# Patient Record
Sex: FEMALE | Race: WHITE | NOT HISPANIC OR LATINO
[De-identification: names, ages, dates, MRNs, and addresses within clinical notes are randomized per-mention and may not be internally consistent; named-entity substitution may affect disease eponyms.]

---

## 2017-01-11 ENCOUNTER — LABORATORY RESULT (OUTPATIENT)
Age: 77
End: 2017-01-11

## 2017-01-11 ENCOUNTER — APPOINTMENT (OUTPATIENT)
Dept: INTERNAL MEDICINE | Facility: CLINIC | Age: 77
End: 2017-01-11

## 2017-01-11 VITALS
RESPIRATION RATE: 14 BRPM | WEIGHT: 111 LBS | TEMPERATURE: 98.6 F | DIASTOLIC BLOOD PRESSURE: 60 MMHG | HEART RATE: 68 BPM | SYSTOLIC BLOOD PRESSURE: 118 MMHG | HEIGHT: 63 IN | BODY MASS INDEX: 19.67 KG/M2

## 2017-01-11 LAB
FLUAV SPEC QL CULT: NORMAL
FLUBV AG SPEC QL IA: NORMAL

## 2017-01-11 RX ORDER — AZELASTINE HYDROCHLORIDE AND FLUTICASONE PROPIONATE 137; 50 UG/1; UG/1
137-50 SPRAY, METERED NASAL
Qty: 23 | Refills: 0 | Status: DISCONTINUED | COMMUNITY
Start: 2016-11-23

## 2017-01-13 LAB
ALBUMIN SERPL ELPH-MCNC: 3.3 G/DL
ALP BLD-CCNC: 109 U/L
ALT SERPL-CCNC: 17 U/L
ANION GAP SERPL CALC-SCNC: 14 MMOL/L
AST SERPL-CCNC: 21 U/L
BASOPHILS # BLD AUTO: 0.02 K/UL
BASOPHILS NFR BLD AUTO: 0.3 %
BILIRUB SERPL-MCNC: 0.4 MG/DL
BUN SERPL-MCNC: 17 MG/DL
CALCIUM SERPL-MCNC: 10.4 MG/DL
CHLORIDE SERPL-SCNC: 99 MMOL/L
CO2 SERPL-SCNC: 23 MMOL/L
CREAT SERPL-MCNC: 0.83 MG/DL
EOSINOPHIL # BLD AUTO: 0.24 K/UL
EOSINOPHIL NFR BLD AUTO: 3.7 %
GLUCOSE SERPL-MCNC: 98 MG/DL
HCT VFR BLD CALC: 31.6 %
HGB BLD-MCNC: 10.3 G/DL
IMM GRANULOCYTES NFR BLD AUTO: 0.2 %
LYMPHOCYTES # BLD AUTO: 0.85 K/UL
LYMPHOCYTES NFR BLD AUTO: 13 %
MAN DIFF?: NORMAL
MCHC RBC-ENTMCNC: 31.7 PG
MCHC RBC-ENTMCNC: 32.6 GM/DL
MCV RBC AUTO: 97.2 FL
MONOCYTES # BLD AUTO: 0.62 K/UL
MONOCYTES NFR BLD AUTO: 9.5 %
NEUTROPHILS # BLD AUTO: 4.79 K/UL
NEUTROPHILS NFR BLD AUTO: 73.3 %
PLATELET # BLD AUTO: 379 K/UL
POTASSIUM SERPL-SCNC: 4.5 MMOL/L
PROT SERPL-MCNC: 6.5 G/DL
RBC # BLD: 3.25 M/UL
RBC # FLD: 13.8 %
SODIUM SERPL-SCNC: 136 MMOL/L
WBC # FLD AUTO: 6.53 K/UL

## 2017-02-01 ENCOUNTER — APPOINTMENT (OUTPATIENT)
Dept: INTERNAL MEDICINE | Facility: CLINIC | Age: 77
End: 2017-02-01

## 2017-02-05 ENCOUNTER — EMERGENCY (EMERGENCY)
Facility: HOSPITAL | Age: 77
LOS: 1 days | Discharge: HOME | End: 2017-02-05
Attending: EMERGENCY MEDICINE
Payer: MEDICARE

## 2017-02-05 VITALS
WEIGHT: 110.89 LBS | SYSTOLIC BLOOD PRESSURE: 133 MMHG | RESPIRATION RATE: 20 BRPM | DIASTOLIC BLOOD PRESSURE: 68 MMHG | TEMPERATURE: 99 F | OXYGEN SATURATION: 99 % | HEART RATE: 84 BPM

## 2017-02-05 VITALS
RESPIRATION RATE: 18 BRPM | DIASTOLIC BLOOD PRESSURE: 68 MMHG | OXYGEN SATURATION: 99 % | TEMPERATURE: 98 F | SYSTOLIC BLOOD PRESSURE: 132 MMHG | HEART RATE: 80 BPM

## 2017-02-05 DIAGNOSIS — Z88.5 ALLERGY STATUS TO NARCOTIC AGENT: ICD-10-CM

## 2017-02-05 DIAGNOSIS — Z98.890 OTHER SPECIFIED POSTPROCEDURAL STATES: ICD-10-CM

## 2017-02-05 DIAGNOSIS — E78.5 HYPERLIPIDEMIA, UNSPECIFIED: ICD-10-CM

## 2017-02-05 DIAGNOSIS — E21.3 HYPERPARATHYROIDISM, UNSPECIFIED: ICD-10-CM

## 2017-02-05 DIAGNOSIS — Z90.2 ACQUIRED ABSENCE OF LUNG [PART OF]: Chronic | ICD-10-CM

## 2017-02-05 DIAGNOSIS — I48.0 PAROXYSMAL ATRIAL FIBRILLATION: ICD-10-CM

## 2017-02-05 DIAGNOSIS — J44.9 CHRONIC OBSTRUCTIVE PULMONARY DISEASE, UNSPECIFIED: ICD-10-CM

## 2017-02-05 DIAGNOSIS — R50.9 FEVER, UNSPECIFIED: ICD-10-CM

## 2017-02-05 DIAGNOSIS — D64.9 ANEMIA, UNSPECIFIED: ICD-10-CM

## 2017-02-05 DIAGNOSIS — Z98.89 OTHER SPECIFIED POSTPROCEDURAL STATES: Chronic | ICD-10-CM

## 2017-02-05 DIAGNOSIS — Z88.0 ALLERGY STATUS TO PENICILLIN: ICD-10-CM

## 2017-02-05 DIAGNOSIS — K21.9 GASTRO-ESOPHAGEAL REFLUX DISEASE WITHOUT ESOPHAGITIS: ICD-10-CM

## 2017-02-05 DIAGNOSIS — Z88.2 ALLERGY STATUS TO SULFONAMIDES: ICD-10-CM

## 2017-02-05 DIAGNOSIS — E03.5 MYXEDEMA COMA: ICD-10-CM

## 2017-02-05 LAB
ALBUMIN SERPL ELPH-MCNC: 3.3 G/DL — SIGNIFICANT CHANGE UP (ref 3.3–5.2)
ALLERGY+IMMUNOLOGY DIAG STUDY NOTE: SIGNIFICANT CHANGE UP
ALP SERPL-CCNC: 117 U/L — SIGNIFICANT CHANGE UP (ref 40–120)
ALT FLD-CCNC: 37 U/L — HIGH
ANION GAP SERPL CALC-SCNC: 13 MMOL/L — SIGNIFICANT CHANGE UP (ref 5–17)
APPEARANCE UR: ABNORMAL
APTT BLD: 36.6 SEC — SIGNIFICANT CHANGE UP (ref 27.5–37.4)
AST SERPL-CCNC: 56 U/L — HIGH
BACTERIA # UR AUTO: ABNORMAL
BASOPHILS # BLD AUTO: 0 K/UL — SIGNIFICANT CHANGE UP (ref 0–0.2)
BASOPHILS NFR BLD AUTO: 0.3 % — SIGNIFICANT CHANGE UP (ref 0–2)
BILIRUB SERPL-MCNC: 0.3 MG/DL — LOW (ref 0.4–2)
BILIRUB UR-MCNC: ABNORMAL
BLD GP AB SCN SERPL QL: ABNORMAL
BUN SERPL-MCNC: 25 MG/DL — HIGH (ref 8–20)
CALCIUM SERPL-MCNC: 11.4 MG/DL — HIGH (ref 8.6–10.2)
CHLORIDE SERPL-SCNC: 98 MMOL/L — SIGNIFICANT CHANGE UP (ref 98–107)
CO2 SERPL-SCNC: 26 MMOL/L — SIGNIFICANT CHANGE UP (ref 22–29)
COLOR SPEC: YELLOW — SIGNIFICANT CHANGE UP
CREAT SERPL-MCNC: 0.92 MG/DL — SIGNIFICANT CHANGE UP (ref 0.5–1.3)
DIFF PNL FLD: ABNORMAL
DIR ANTIGLOB POLYSPECIFIC INTERPRETATION: SIGNIFICANT CHANGE UP
EOSINOPHIL # BLD AUTO: 0.1 K/UL — SIGNIFICANT CHANGE UP (ref 0–0.5)
EOSINOPHIL NFR BLD AUTO: 1.3 % — SIGNIFICANT CHANGE UP (ref 0–6)
EPI CELLS # UR: SIGNIFICANT CHANGE UP
GLUCOSE SERPL-MCNC: 96 MG/DL — SIGNIFICANT CHANGE UP (ref 70–115)
GLUCOSE UR QL: NEGATIVE MG/DL — SIGNIFICANT CHANGE UP
HCT VFR BLD CALC: 24.3 % — LOW (ref 37–47)
HGB BLD-MCNC: 7.8 G/DL — LOW (ref 12–16)
INR BLD: 1.43 RATIO — HIGH (ref 0.88–1.16)
KETONES UR-MCNC: NEGATIVE — SIGNIFICANT CHANGE UP
LACTATE BLDV-MCNC: 1 MMOL/L — SIGNIFICANT CHANGE UP (ref 0.5–1.6)
LEUKOCYTE ESTERASE UR-ACNC: ABNORMAL
LYMPHOCYTES # BLD AUTO: 1.1 K/UL — SIGNIFICANT CHANGE UP (ref 1–4.8)
LYMPHOCYTES # BLD AUTO: 16.6 % — LOW (ref 20–55)
MCHC RBC-ENTMCNC: 30.1 PG — SIGNIFICANT CHANGE UP (ref 27–31)
MCHC RBC-ENTMCNC: 32.1 G/DL — SIGNIFICANT CHANGE UP (ref 32–36)
MCV RBC AUTO: 93.8 FL — SIGNIFICANT CHANGE UP (ref 81–99)
MONOCYTES # BLD AUTO: 0.7 K/UL — SIGNIFICANT CHANGE UP (ref 0–0.8)
MONOCYTES NFR BLD AUTO: 10.3 % — HIGH (ref 3–10)
NEUTROPHILS # BLD AUTO: 4.8 K/UL — SIGNIFICANT CHANGE UP (ref 1.8–8)
NEUTROPHILS NFR BLD AUTO: 71.4 % — SIGNIFICANT CHANGE UP (ref 37–73)
NITRITE UR-MCNC: NEGATIVE — SIGNIFICANT CHANGE UP
OB PNL STL: POSITIVE
PH UR: 6 — SIGNIFICANT CHANGE UP (ref 4.8–8)
PLATELET # BLD AUTO: 476 K/UL — HIGH (ref 150–400)
POTASSIUM SERPL-MCNC: 4.8 MMOL/L — SIGNIFICANT CHANGE UP (ref 3.5–5.3)
POTASSIUM SERPL-SCNC: 4.8 MMOL/L — SIGNIFICANT CHANGE UP (ref 3.5–5.3)
PROT SERPL-MCNC: 8.1 G/DL — SIGNIFICANT CHANGE UP (ref 6.6–8.7)
PROT UR-MCNC: 30 MG/DL
PROTHROM AB SERPL-ACNC: 15.8 SEC — HIGH (ref 10–13.1)
RBC # BLD: 2.59 M/UL — LOW (ref 4.4–5.2)
RBC # FLD: 14.1 % — SIGNIFICANT CHANGE UP (ref 11–15.6)
RBC CASTS # UR COMP ASSIST: ABNORMAL /HPF (ref 0–4)
SODIUM SERPL-SCNC: 137 MMOL/L — SIGNIFICANT CHANGE UP (ref 135–145)
SP GR SPEC: 1.02 — SIGNIFICANT CHANGE UP (ref 1.01–1.02)
TYPE + AB SCN PNL BLD: SIGNIFICANT CHANGE UP
UROBILINOGEN FLD QL: 4 MG/DL
WBC # BLD: 6.67 K/UL — SIGNIFICANT CHANGE UP (ref 4.8–10.8)
WBC # FLD AUTO: 6.67 K/UL — SIGNIFICANT CHANGE UP (ref 4.8–10.8)
WBC UR QL: >50

## 2017-02-05 PROCEDURE — 86077 PHYS BLOOD BANK SERV XMATCH: CPT

## 2017-02-05 PROCEDURE — 99284 EMERGENCY DEPT VISIT MOD MDM: CPT

## 2017-02-05 RX ORDER — ACETAMINOPHEN 500 MG
650 TABLET ORAL ONCE
Qty: 0 | Refills: 0 | Status: COMPLETED | OUTPATIENT
Start: 2017-02-05 | End: 2017-02-05

## 2017-02-05 RX ADMIN — Medication 650 MILLIGRAM(S): at 16:44

## 2017-02-05 NOTE — CONSULT NOTE ADULT - SUBJECTIVE AND OBJECTIVE BOX
[facility] [place] LENNOX ARELLANO MD FACP   SOTO PEACOCK MD  Diplomates American Board of Internal Medicine and Infecctious Diseases  [phone]  [phone] ERIC [id number]  PT SEEN RECENTLY FOR FUO IN OFFICE WITH DR ARELLANO   HAD BLOOD CX DRAWN AS OUTPT ON 2/3 AND ON  2/4 GM POS COCCI IN CLUSTERS PRELIM COAG NEG STAPH  ALSO WITH LOW HB 7.9 CALLED FAMILY YESTERDAY AND PT  CAME IN FOR EVAL  REPEAT HB 7.8 WAS  10 ON JAN 11 2017 IN PMD OFFICE      HPI:      PAST MEDICAL & SURGICAL HISTORY:  Breast cancer: right lumpectomy RT/CT  Lung cancer: right CT/RT  GERD (gastroesophageal reflux disease)  Sinus problem  Hypothyroid  Hyperparathyroidism  Smoker  Hyperlipidemia  COPD (chronic obstructive pulmonary disease)  Anxiety  Palpitations  Atrial fibrillation: PAF  S/P lumpectomy, right breast: CT/RT  S/P partial lobectomy of lung: right      ANTIBIOTICS      Allergies    adhesives (Rash)  Ceclor (Rash)  cephalosporins (Anaphylaxis)  codeine (Hives)  Demerol HCl (Hives)  penicillin (Short breath)  sulfa drugs (Hives)  Sulfac 10% (Anaphylaxis; Short breath)    Intolerances        SOCIAL HISTORY:    FAMILY HISTORY:      Vital Signs Last 24 Hrs  T(C): 37.1, Max: 37.1 (02-05 @ 13:14)  T(F): 98.7, Max: 98.7 (02-05 @ 13:14)  HR: 84 (84 - 84)  BP: 133/68 (133/68 - 133/68)  BP(mean): --  RR: 20 (20 - 20)  SpO2: 99% (99% - 99%)  Drug Dosing Weight  Height (cm): 160 (20 Sep 2016 18:49)  Weight (kg): 50.3 (05 Feb 2017 13:14)  BMI (kg/m2): 19.6 (05 Feb 2017 13:14)  BSA (m2): 1.5 (05 Feb 2017 13:14)      REVIEW OF SYSTEMS:    CONSTITUTIONAL:  As per HPI.    HEENT:  Eyes:  No diplopia or blurred vision. ENT:  No earache, sore throat or runny nose.    CARDIOVASCULAR:  No pressure, squeezing, strangling, tightness, heaviness or aching about the chest, neck, axilla or epigastrium.    RESPIRATORY:  No cough, shortness of breath, PND or orthopnea.    GASTROINTESTINAL:  No nausea, vomiting or diarrhea.    GENITOURINARY:  No dysuria, frequency or urgency.    MUSCULOSKELETAL:  As per HPI.    SKIN:  No change in skin, hair or nails.    NEUROLOGIC:  No paresthesias, fasciculations, seizures or weakness.                  PHYSICAL EXAMINATION:    GENERAL: The patient is a well-developed, well-nourished ___FEMALE__in no apparent distress. __SHE_ is alert and oriented x3.    VITAL SIGNS: T(C): 37.1, Max: 37.1 (02-05 @ 13:14)  HR: 84 (84 - 84)  BP: 133/68 (133/68 - 133/68)  RR: 20 (20 - 20)  SpO2: 99% (99% - 99%)  Wt(kg): --    HEENT: Head is normocephalic and atraumatic.  ANICTERIC  NECK: Supple. No carotid bruits.  No lymphadenopathy or thyromegaly.    LUNGS:COARSE BREATH SOUNDS    HEART: Regular rate and rhythm without murmur.    ABDOMEN: Soft, nontender, and nondistended.  Positive bowel sounds.  No hepatosplenomegaly was noted. NO REBOUND NO GUARDING    EXTREMITIES: NO EDEMA NO ERYTHEMA    NEUROLOGIC: NON FOCAL      SKIN: No ulceration or induration present. NO RASH        BLOOD CULTURES       URINE CX          LABS:                        7.8    6.67  )-----------( 476      ( 05 Feb 2017 14:37 )             24.3     05 Feb 2017 14:37    137    |  98     |  25.0   ----------------------------<  96     4.8     |  26.0   |  0.92     Ca    11.4       05 Feb 2017 14:37    TPro  8.1    /  Alb  3.3    /  TBili  0.3    /  DBili  x      /  AST  56     /  ALT  37     /  AlkPhos  117    05 Feb 2017 14:37    PT/INR - ( 05 Feb 2017 14:37 )   PT: 15.8 sec;   INR: 1.43 ratio         PTT - ( 05 Feb 2017 14:37 )  PTT:36.6 sec      RADIOLOGY & ADDITIONAL STUDIES:      ASSESSMENT/PLAN  75YO FEMALE WITH REPORTED FUO WITH 2/4 COAG NEG STAPH  WOULD REPEAT CX X 2 SETS AS THIS MAY REPRESENT CONTAMINANTS OR SKIN JAMIE  IN TERMS OF HB WORKUP AS PER MEDICINE  WILL FOLLOW UP   D/W ANI NOLEN MD

## 2017-02-05 NOTE — ED ADULT NURSE NOTE - PMH
Anxiety    Atrial fibrillation  PAF  Breast cancer  right lumpectomy RT/CT  COPD (chronic obstructive pulmonary disease)    GERD (gastroesophageal reflux disease)    Hyperlipidemia    Hyperparathyroidism    Hypothyroid    Lung cancer  right CT/RT  Palpitations    Sinus problem    Smoker

## 2017-02-05 NOTE — ED PROVIDER NOTE - PROGRESS NOTE DETAILS
pt ok with transfusion given symptomatic anemia - does refuse admission at this time given that she feels she need to be home to care for  she will f/u with dr lovelace and group tomorrow

## 2017-02-05 NOTE — ED PROVIDER NOTE - OBJECTIVE STATEMENT
75 y/o F presents b/c of possible + blood cultures fever for 39 days as well as reported anemia as per pcp - denies blood in the stool but has had ? rectovaginal fistula- intermittent vaginal d/c brownish in color per pt feeling weak and lightheaded denies cough no abd pain no urinary c/o had been having extensive outpt f/u with Dr Shelton for FUO

## 2017-02-05 NOTE — ED ADULT NURSE NOTE - OBJECTIVE STATEMENT
pt was send to the ED by her PMD for a low Hgb.  pt stated that she had blood work done at Dr Shelton office.  then pt stated that today receive a call for a low Hgb of 7.9.  pt denies any bleeding.  pt is A/ox3.

## 2017-02-05 NOTE — ED ADULT TRIAGE NOTE - CHIEF COMPLAINT QUOTE
had blood work at Dr Shelton office. had for 40 days so went to PMD then to I/D Dr Shelton. Received call today for low blood count 7.9. no bleeding.

## 2017-02-05 NOTE — ED STATDOCS - PROGRESS NOTE DETAILS
75 y/o F, with hx of anemia, A-fib, and possible rectovaginal fistula, presenting to the ED complaining of low hemoglobin level. Pt was sent to the ED by Dr. Shelton due to hemoglobin of 7.9. Pt reports fever of 39 days without identified cause and is being workup by Dr. Shelton. Pt denies source of bleeding, abdominal pain, nausea, vomiting, diarrhea, constipation, chest pain, and SOB. Focused eval, protocol orders entered. Pt to be moved to main ED for complete evaluation by another provider.

## 2017-02-05 NOTE — ED PROVIDER NOTE - MEDICAL DECISION MAKING DETAILS
symptomatic anemia FUO for 40 days - seen by WILMER Lowry - doesn't advise abx at this time, given outpt cultures but advises another set - low grade fevers here appears will mildly symptomatic anemia - will transfuse declines admission

## 2017-02-05 NOTE — ED PROVIDER NOTE - CARE PLAN
Principal Discharge DX:	Anemia, unspecified type  Secondary Diagnosis:	FUO (fever of unknown origin)

## 2017-02-06 LAB
CULTURE RESULTS: SIGNIFICANT CHANGE UP
SPECIMEN SOURCE: SIGNIFICANT CHANGE UP

## 2017-02-07 ENCOUNTER — INPATIENT (INPATIENT)
Facility: HOSPITAL | Age: 77
LOS: 11 days | Discharge: HOME | DRG: 377 | End: 2017-02-19
Attending: INTERNAL MEDICINE
Payer: MEDICARE

## 2017-02-07 VITALS — WEIGHT: 110.89 LBS

## 2017-02-07 DIAGNOSIS — K92.2 GASTROINTESTINAL HEMORRHAGE, UNSPECIFIED: ICD-10-CM

## 2017-02-07 DIAGNOSIS — Z90.2 ACQUIRED ABSENCE OF LUNG [PART OF]: Chronic | ICD-10-CM

## 2017-02-07 DIAGNOSIS — R50.9 FEVER, UNSPECIFIED: ICD-10-CM

## 2017-02-07 DIAGNOSIS — Z98.89 OTHER SPECIFIED POSTPROCEDURAL STATES: Chronic | ICD-10-CM

## 2017-02-07 DIAGNOSIS — I48.91 UNSPECIFIED ATRIAL FIBRILLATION: ICD-10-CM

## 2017-02-07 DIAGNOSIS — N82.4 OTHER FEMALE INTESTINAL-GENITAL TRACT FISTULAE: ICD-10-CM

## 2017-02-07 DIAGNOSIS — E78.5 HYPERLIPIDEMIA, UNSPECIFIED: ICD-10-CM

## 2017-02-07 DIAGNOSIS — E03.9 HYPOTHYROIDISM, UNSPECIFIED: ICD-10-CM

## 2017-02-07 LAB
ALBUMIN SERPL ELPH-MCNC: 3.2 G/DL — LOW (ref 3.3–5.2)
ALLERGY+IMMUNOLOGY DIAG STUDY NOTE: SIGNIFICANT CHANGE UP
ALP SERPL-CCNC: 109 U/L — SIGNIFICANT CHANGE UP (ref 40–120)
ALT FLD-CCNC: 33 U/L — HIGH
ANION GAP SERPL CALC-SCNC: 12 MMOL/L — SIGNIFICANT CHANGE UP (ref 5–17)
APPEARANCE UR: ABNORMAL
AST SERPL-CCNC: 43 U/L — HIGH
BASOPHILS # BLD AUTO: 0 K/UL — SIGNIFICANT CHANGE UP (ref 0–0.2)
BASOPHILS NFR BLD AUTO: 0.1 % — SIGNIFICANT CHANGE UP (ref 0–2)
BILIRUB SERPL-MCNC: 0.4 MG/DL — SIGNIFICANT CHANGE UP (ref 0.4–2)
BILIRUB UR-MCNC: NEGATIVE — SIGNIFICANT CHANGE UP
BLD GP AB SCN SERPL QL: ABNORMAL
BUN SERPL-MCNC: 20 MG/DL — SIGNIFICANT CHANGE UP (ref 8–20)
CALCIUM SERPL-MCNC: 10.9 MG/DL — HIGH (ref 8.6–10.2)
CHLORIDE SERPL-SCNC: 100 MMOL/L — SIGNIFICANT CHANGE UP (ref 98–107)
CO2 SERPL-SCNC: 27 MMOL/L — SIGNIFICANT CHANGE UP (ref 22–29)
COLOR SPEC: YELLOW — SIGNIFICANT CHANGE UP
CREAT SERPL-MCNC: 0.73 MG/DL — SIGNIFICANT CHANGE UP (ref 0.5–1.3)
DIFF PNL FLD: ABNORMAL
EOSINOPHIL # BLD AUTO: 0.1 K/UL — SIGNIFICANT CHANGE UP (ref 0–0.5)
EOSINOPHIL NFR BLD AUTO: 1 % — SIGNIFICANT CHANGE UP (ref 0–6)
GLUCOSE SERPL-MCNC: 101 MG/DL — SIGNIFICANT CHANGE UP (ref 70–115)
GLUCOSE UR QL: NEGATIVE MG/DL — SIGNIFICANT CHANGE UP
HCT VFR BLD CALC: 28.1 % — LOW (ref 37–47)
HGB BLD-MCNC: 9.2 G/DL — LOW (ref 12–16)
KETONES UR-MCNC: ABNORMAL
LEUKOCYTE ESTERASE UR-ACNC: ABNORMAL
LYMPHOCYTES # BLD AUTO: 0.8 K/UL — LOW (ref 1–4.8)
LYMPHOCYTES # BLD AUTO: 9.9 % — LOW (ref 20–55)
MCHC RBC-ENTMCNC: 30.6 PG — SIGNIFICANT CHANGE UP (ref 27–31)
MCHC RBC-ENTMCNC: 32.7 G/DL — SIGNIFICANT CHANGE UP (ref 32–36)
MCV RBC AUTO: 93.4 FL — SIGNIFICANT CHANGE UP (ref 81–99)
MONOCYTES # BLD AUTO: 0.7 K/UL — SIGNIFICANT CHANGE UP (ref 0–0.8)
MONOCYTES NFR BLD AUTO: 8.8 % — SIGNIFICANT CHANGE UP (ref 3–10)
NEUTROPHILS # BLD AUTO: 6.5 K/UL — SIGNIFICANT CHANGE UP (ref 1.8–8)
NEUTROPHILS NFR BLD AUTO: 80 % — HIGH (ref 37–73)
NITRITE UR-MCNC: NEGATIVE — SIGNIFICANT CHANGE UP
PH UR: 6 — SIGNIFICANT CHANGE UP (ref 4.8–8)
PLATELET # BLD AUTO: 444 K/UL — HIGH (ref 150–400)
POTASSIUM SERPL-MCNC: 4.3 MMOL/L — SIGNIFICANT CHANGE UP (ref 3.5–5.3)
POTASSIUM SERPL-SCNC: 4.3 MMOL/L — SIGNIFICANT CHANGE UP (ref 3.5–5.3)
PROT SERPL-MCNC: 8.2 G/DL — SIGNIFICANT CHANGE UP (ref 6.6–8.7)
PROT UR-MCNC: 30 MG/DL
RBC # BLD: 3.01 M/UL — LOW (ref 4.4–5.2)
RBC # FLD: 14.5 % — SIGNIFICANT CHANGE UP (ref 11–15.6)
SODIUM SERPL-SCNC: 139 MMOL/L — SIGNIFICANT CHANGE UP (ref 135–145)
SP GR SPEC: 1.01 — SIGNIFICANT CHANGE UP (ref 1.01–1.02)
TSH SERPL-MCNC: 1.36 UIU/ML — SIGNIFICANT CHANGE UP (ref 0.27–4.2)
TYPE + AB SCN PNL BLD: SIGNIFICANT CHANGE UP
UROBILINOGEN FLD QL: 4 MG/DL
WBC # BLD: 8.16 K/UL — SIGNIFICANT CHANGE UP (ref 4.8–10.8)
WBC # FLD AUTO: 8.16 K/UL — SIGNIFICANT CHANGE UP (ref 4.8–10.8)

## 2017-02-07 PROCEDURE — 71010: CPT | Mod: 26

## 2017-02-07 PROCEDURE — 99285 EMERGENCY DEPT VISIT HI MDM: CPT

## 2017-02-07 PROCEDURE — 93010 ELECTROCARDIOGRAM REPORT: CPT

## 2017-02-07 PROCEDURE — 99223 1ST HOSP IP/OBS HIGH 75: CPT

## 2017-02-07 RX ORDER — PANTOPRAZOLE SODIUM 20 MG/1
40 TABLET, DELAYED RELEASE ORAL
Qty: 0 | Refills: 0 | Status: DISCONTINUED | OUTPATIENT
Start: 2017-02-07 | End: 2017-02-19

## 2017-02-07 RX ORDER — CHOLECALCIFEROL (VITAMIN D3) 125 MCG
1000 CAPSULE ORAL DAILY
Qty: 0 | Refills: 0 | Status: DISCONTINUED | OUTPATIENT
Start: 2017-02-07 | End: 2017-02-19

## 2017-02-07 RX ORDER — FLECAINIDE ACETATE 50 MG
50 TABLET ORAL EVERY 12 HOURS
Qty: 0 | Refills: 0 | Status: DISCONTINUED | OUTPATIENT
Start: 2017-02-07 | End: 2017-02-19

## 2017-02-07 RX ORDER — SIMVASTATIN 20 MG/1
20 TABLET, FILM COATED ORAL AT BEDTIME
Qty: 0 | Refills: 0 | Status: DISCONTINUED | OUTPATIENT
Start: 2017-02-07 | End: 2017-02-19

## 2017-02-07 RX ORDER — ATENOLOL 25 MG/1
25 TABLET ORAL DAILY
Qty: 0 | Refills: 0 | Status: DISCONTINUED | OUTPATIENT
Start: 2017-02-07 | End: 2017-02-19

## 2017-02-07 RX ORDER — ALPRAZOLAM 0.25 MG
0.25 TABLET ORAL THREE TIMES A DAY
Qty: 0 | Refills: 0 | Status: DISCONTINUED | OUTPATIENT
Start: 2017-02-07 | End: 2017-02-07

## 2017-02-07 RX ADMIN — SIMVASTATIN 20 MILLIGRAM(S): 20 TABLET, FILM COATED ORAL at 20:51

## 2017-02-07 RX ADMIN — Medication 50 MILLIGRAM(S): at 19:53

## 2017-02-07 NOTE — ED STATDOCS - PROGRESS NOTE DETAILS
76 year old female presenting to the ED with bright red blood in her stool x 2 episodes yesterday, weakness, chills, and fever. Pt states that she had seen infectious diseases 4 days ago and was sent for a blood test 3 days ago. She states that she visited the hospital 2 days ago and had a guaiac positive test. Pt states that she had 2 bowel movements yesterday which had bright red stool. She states that her last colonoscopy was performed in November 2016. Pt states that she does not have any abdominal pain, nausea, or vomiting currently. She states that she has PMHx of a-fib, HLD, and lung resection. Upon examination, pt was found to have bronchovesicular breath sounds in the right lower lobe and right upper lobe. Will transfer to the main ED for further evaluation by another provider.  Contact: Gamaliel Ram ([phone])

## 2017-02-07 NOTE — H&P ADULT. - PROBLEM SELECTOR PLAN 1
hemodynamically stable.  ASA discontinued for now.  started on PPI.  repeat H/H in am.  GI consult-

## 2017-02-07 NOTE — ED PROVIDER NOTE - OBJECTIVE STATEMENT
PT WAS Sseen in Ed two days ago found to have new anemia and was suppose to stay but could not b/c of sick  returns today for , pt also with daily fever x 41 days admission after two episode of diarrhea bloody

## 2017-02-07 NOTE — H&P ADULT. - HISTORY OF PRESENT ILLNESS
76 years old female with PMH of HTN, A.Fib, dyslipidemia, Anxiety and abnormal colonoscopy on 11/2016 presented to the ER for the evaluation of continued bloody stools associated with fatigue and generalized weakness. She was seen in the ER 2 days ago for anemia and received PRBC. She was sent home to follow up with her PCP. Patient reports 1 month of fever for which she was seen by . He ordered blood work but didn't put her on any antibiotic. Patient also reports poor appetites, loss of weight and constipation. Patient also has a h/o colovaginal fistula for which she was seen by   but no surgical intervention was offered (per patient). Denies any abdominal pain, nausea, vomiting, SOB, dizziness or lightheadedness.

## 2017-02-07 NOTE — ED CLERICAL - NS ED CLERK UNITS
6TWR PAIN ACS/6TWR MON/PAIN ACS/2GUL mon/5TWR [id number] med overflow/5TWR 5TWR/ISO mon ISO/5TWR [id number] mon ISO/4TWR

## 2017-02-07 NOTE — ED STATDOCS - OBJECTIVE STATEMENT
colovaginal fistula with rectal bleeding . orange bloody stool   monday x two no pain   no n/v/, c/o fever tmax 100

## 2017-02-07 NOTE — ED ADULT NURSE NOTE - OBJECTIVE STATEMENT
Patient reports hospitalization earlier this week with low HgB 7.8 and received PRBCs but due to caregiver issues for her , patient was not admitted. Patient reports on Monday she began having orange colored diarrhea. patient reports hx of fever of unknown origin, MD Shelton infectious Disease has consulted on her case and she chronically for past 41 days feels fever, chills and fatigue. patient denies any pain. Skin pink dry warm. no obvious bleeding noted. IV access and labs obtained.

## 2017-02-07 NOTE — ED ADULT TRIAGE NOTE - CHIEF COMPLAINT QUOTE
GIB. Was here Sunday and did not want to be admitted. Still has bleeding. PMD says go back to ED and stay this time. Sees Dr Orozco and Dr Shelton of I/D for fevers of unk origin. no pain.

## 2017-02-07 NOTE — H&P ADULT. - ASSESSMENT
76 years old female with PMH of HTN, A.Fib, dyslipidemia, lung/breast cancer and abnormal colonoscopy in 11/2016 admitted with GI bleed.

## 2017-02-08 DIAGNOSIS — R50.9 FEVER, UNSPECIFIED: ICD-10-CM

## 2017-02-08 DIAGNOSIS — D64.9 ANEMIA, UNSPECIFIED: ICD-10-CM

## 2017-02-08 LAB
C DIFF BY PCR RESULT: DETECTED
C DIFF TOX GENS STL QL NAA+PROBE: SIGNIFICANT CHANGE UP
DIR ANTIGLOB POLYSPECIFIC INTERPRETATION: SIGNIFICANT CHANGE UP
HCT VFR BLD CALC: 24.8 % — LOW (ref 37–47)
HCT VFR BLD CALC: 26.6 % — LOW (ref 37–47)
HGB BLD-MCNC: 8.1 G/DL — LOW (ref 12–16)
HGB BLD-MCNC: 8.5 G/DL — LOW (ref 12–16)
MCHC RBC-ENTMCNC: 30.1 PG — SIGNIFICANT CHANGE UP (ref 27–31)
MCHC RBC-ENTMCNC: 30.2 PG — SIGNIFICANT CHANGE UP (ref 27–31)
MCHC RBC-ENTMCNC: 32 G/DL — SIGNIFICANT CHANGE UP (ref 32–36)
MCHC RBC-ENTMCNC: 32.7 G/DL — SIGNIFICANT CHANGE UP (ref 32–36)
MCV RBC AUTO: 92.5 FL — SIGNIFICANT CHANGE UP (ref 81–99)
MCV RBC AUTO: 94.3 FL — SIGNIFICANT CHANGE UP (ref 81–99)
PLATELET # BLD AUTO: 374 K/UL — SIGNIFICANT CHANGE UP (ref 150–400)
PLATELET # BLD AUTO: 414 K/UL — HIGH (ref 150–400)
PROCALCITONIN SERPL-MCNC: <0.23 NG/ML — SIGNIFICANT CHANGE UP (ref 0–0.5)
RBC # BLD: 2.68 M/UL — LOW (ref 4.4–5.2)
RBC # BLD: 2.82 M/UL — LOW (ref 4.4–5.2)
RBC # FLD: 14.3 % — SIGNIFICANT CHANGE UP (ref 11–15.6)
RBC # FLD: 14.3 % — SIGNIFICANT CHANGE UP (ref 11–15.6)
WBC # BLD: 6.85 K/UL — SIGNIFICANT CHANGE UP (ref 4.8–10.8)
WBC # BLD: 7.55 K/UL — SIGNIFICANT CHANGE UP (ref 4.8–10.8)
WBC # FLD AUTO: 6.85 K/UL — SIGNIFICANT CHANGE UP (ref 4.8–10.8)
WBC # FLD AUTO: 7.55 K/UL — SIGNIFICANT CHANGE UP (ref 4.8–10.8)

## 2017-02-08 PROCEDURE — 74176 CT ABD & PELVIS W/O CONTRAST: CPT | Mod: 26

## 2017-02-08 PROCEDURE — 99222 1ST HOSP IP/OBS MODERATE 55: CPT

## 2017-02-08 PROCEDURE — 99233 SBSQ HOSP IP/OBS HIGH 50: CPT | Mod: GC

## 2017-02-08 RX ORDER — VANCOMYCIN HCL 1 G
125 VIAL (EA) INTRAVENOUS EVERY 6 HOURS
Qty: 0 | Refills: 0 | Status: COMPLETED | OUTPATIENT
Start: 2017-02-08 | End: 2017-02-15

## 2017-02-08 RX ORDER — ACETAMINOPHEN 500 MG
650 TABLET ORAL EVERY 6 HOURS
Qty: 0 | Refills: 0 | Status: DISCONTINUED | OUTPATIENT
Start: 2017-02-08 | End: 2017-02-19

## 2017-02-08 RX ORDER — LOPERAMIDE HCL 2 MG
2 TABLET ORAL ONCE
Qty: 0 | Refills: 0 | Status: COMPLETED | OUTPATIENT
Start: 2017-02-08 | End: 2017-02-09

## 2017-02-08 RX ADMIN — Medication 650 MILLIGRAM(S): at 17:20

## 2017-02-08 RX ADMIN — Medication 50 MILLIGRAM(S): at 05:38

## 2017-02-08 RX ADMIN — ATENOLOL 25 MILLIGRAM(S): 25 TABLET ORAL at 05:38

## 2017-02-08 RX ADMIN — Medication 650 MILLIGRAM(S): at 11:19

## 2017-02-08 RX ADMIN — Medication 50 MILLIGRAM(S): at 19:40

## 2017-02-08 RX ADMIN — Medication 1000 UNIT(S): at 13:22

## 2017-02-08 RX ADMIN — PANTOPRAZOLE SODIUM 40 MILLIGRAM(S): 20 TABLET, DELAYED RELEASE ORAL at 05:38

## 2017-02-08 NOTE — PROGRESS NOTE ADULT - SUBJECTIVE AND OBJECTIVE BOX
BRANNON GRANT    [id number]    76y      Female    INTERVAL HPI/OVERNIGHT EVENTS:  77 y/o female with hx of HTN, Afib, dyslipidemia, Anxiety, and abnormal colonoscopy 2016 presented to ED for evaluation of continued bloody stools associated with fatigue and generalized weakness.  She had been seen in ED 2 days prior for anemia and transfused 2 units PrBC.  Pt reports one month of fever for which she was seen by Nikita.  He ordered a blood work up but did not put her on antibiotics.  Pt also reports poor appetite, loss of weight and constipation.  Pt denies any abdominal pain this morning.  C/o of chills, current temp 100.5,  Pt reports she had one questionable bloody BM on Monday and none since. She has been having regular BMs.  Denies n/v/SBO/ lightheadness/ dizziness.    REVIEW OF SYSTEMS:    CONSTITUTIONAL: + fever,  + weight loss, + fatigue  RESPIRATORY: No cough, wheezing, hemoptysis; No shortness of breath  CARDIOVASCULAR: No chest pain, palpitations  GASTROINTESTINAL: No abdominal or epigastric pain. No nausea, vomiting  NEUROLOGICAL: No headaches, memory loss, loss of strength.      Vital Signs Last 24 Hrs  T(C): 37.7, Max: 37.9 (- @ 15:38)  T(F): 99.8, Max: 100.3 (02- @ 15:38)  HR: 74 (72 - 83)  BP: 106/56 (93/48 - 130/73)  BP(mean): 91 (91 - 91)  RR: 18 (16 - 18)  SpO2: 100% (94% - 100%)    PHYSICAL EXAM:    GENERAL: NAD, frail  HEENT: PERRL, +EOMI  NECK: soft, Supple, No JVD,   CHEST/LUNG: Clear to percussion, decreased bs at base on left  HEART: S1S2+, Regular rate and rhythm; No murmurs, rubs, or gallops  ABDOMEN: Soft, Nontender, Nondistended; Bowel sounds present  EXTREMITIES:  2+ Peripheral Pulses, No clubbing, cyanosis, or edema  SKIN: No rashes or lesions  NEURO: AAOX3, no focal deficits, no motor r sensory loss  PSYCH: normal mood      LABS:                        8.1    6.85  )-----------( 374      ( 2017 06:25 )             24.8     2017 14:13    139    |  100    |  20.0   ----------------------------<  101    4.3     |  27.0   |  0.73     Ca    10.9       2017 14:13    TPro  8.2    /  Alb  3.2    /  TBili  0.4    /  DBili  x      /  AST  43     /  ALT  33     /  AlkPhos  109    2017 14:13      Urinalysis Basic - ( 2017 16:11 )    Color: Yellow / Appearance: Slightly Turbid / S.015 / pH: x  Gluc: x / Ketone: Trace  / Bili: Negative / Urobili: 4 mg/dL   Blood: x / Protein: 30 mg/dL / Nitrite: Negative   Leuk Esterase: Moderate / RBC: 3-5 /HPF / WBC >50   Sq Epi: x / Non Sq Epi: Few / Bacteria: Moderate          MEDICATIONS  (STANDING):  flecainide 50milliGRAM(s) Oral every 12 hours  simvastatin 20milliGRAM(s) Oral at bedtime  ATENolol  Tablet 25milliGRAM(s) Oral daily  cholecalciferol 1000Unit(s) Oral daily  pantoprazole    Tablet 40milliGRAM(s) Oral before breakfast    MEDICATIONS  (PRN):  ALPRAZolam 0.25milliGRAM(s) Oral three times a day PRN anxiety      RADIOLOGY & ADDITIONAL TESTS:    CXR:  Abundant right upper lobe fibrosis. No acute changes. There has been no   change since 10/31/2011..    Infectious Disease and GI consults appreciated    2016 CT colonography- Diverticulosis, persistent narrowing of sigmoid colon BRANNON GRANT    [id number]    76y      Female    INTERVAL HPI/OVERNIGHT EVENTS:  77 y/o female with hx of HTN, Afib, dyslipidemia, Anxiety, and abnormal colonoscopy 2016 presented to ED for evaluation of continued bloody stools associated with fatigue and generalized weakness.  She had been seen in ED 2 days prior for anemia and transfused 2 units PrBC.  Pt reported one month of fever for which she was seen by Nikita.  He ordered a blood work up but did not put her on antibiotics.  Pt also reported poor appetite, loss of weight and constipation.  Pt denied any abdominal pain this morning.  C/o of chills, current temp 100.5,  Pt reports she had one questionable bloody BM on Monday and none since. She had been having regular BMs.      Seen in ED.  Notes some loose BMs.  No abdominal pain.  Subjective fever.  No chills.  No additional complaints.       REVIEW OF SYSTEMS:    CONSTITUTIONAL: + fever,  + weight loss, + fatigue  RESPIRATORY: No cough, wheezing, hemoptysis; No shortness of breath  CARDIOVASCULAR: No chest pain, palpitations  GASTROINTESTINAL: No abdominal or epigastric pain. No nausea, vomiting  NEUROLOGICAL: No headaches, memory loss, loss of strength.    PHYSICAL EXAM:    GENERAL: NAD, frail  HEENT: PERRL, +EOMI  NECK: soft, Supple, No JVD,   CHEST/LUNG: Clear to percussion, decreased bs at base on left  HEART: S1S2+, Regular rate and rhythm; No murmurs, rubs, or gallops  ABDOMEN: Soft, Nontender, Nondistended; Bowel sounds present  EXTREMITIES:  2+ Peripheral Pulses, No clubbing, cyanosis, or edema  SKIN: No rashes or lesions  NEURO: AAOX3, no focal deficits, no motor r sensory loss  PSYCH: normal mood        Vital Signs Last 24 Hrs  T(C): 37.7, Max: 37.9 (- @ 15:38)  T(F): 99.8, Max: 100.3 (02- @ 15:38)  HR: 74 (72 - 83)  BP: 106/56 (93/48 - 130/73)  BP(mean): 91 (91 - 91)  RR: 18 (16 - 18)  SpO2: 100% (94% - 100%)      LABS:                        8.1    6.85  )-----------( 374      ( 2017 06:25 )             24.8     2017 14:13    139    |  100    |  20.0   ----------------------------<  101    4.3     |  27.0   |  0.73     Ca    10.9       2017 14:13    TPro  8.2    /  Alb  3.2    /  TBili  0.4    /  DBili  x      /  AST  43     /  ALT  33     /  AlkPhos  109    2017 14:13      Urinalysis Basic - ( 2017 16:11 )    Color: Yellow / Appearance: Slightly Turbid / S.015 / pH: x  Gluc: x / Ketone: Trace  / Bili: Negative / Urobili: 4 mg/dL   Blood: x / Protein: 30 mg/dL / Nitrite: Negative   Leuk Esterase: Moderate / RBC: 3-5 /HPF / WBC >50   Sq Epi: x / Non Sq Epi: Few / Bacteria: Moderate          MEDICATIONS  (STANDING):  flecainide 50milliGRAM(s) Oral every 12 hours  simvastatin 20milliGRAM(s) Oral at bedtime  ATENolol  Tablet 25milliGRAM(s) Oral daily  cholecalciferol 1000Unit(s) Oral daily  pantoprazole    Tablet 40milliGRAM(s) Oral before breakfast    MEDICATIONS  (PRN):  ALPRAZolam 0.25milliGRAM(s) Oral three times a day PRN anxiety      RADIOLOGY & ADDITIONAL TESTS:    CXR:  Abundant right upper lobe fibrosis. No acute changes. There has been no   change since 10/31/2011..    Infectious Disease and GI consults appreciated    2016 CT colonography- Diverticulosis, persistent narrowing of sigmoid colon

## 2017-02-08 NOTE — CONSULT NOTE ADULT - PROBLEM SELECTOR RECOMMENDATION 2
- report of sigmoid thickening on recent colonography; concern raised for neoplasm.  - pending CT abd/pelvis with PO contrast per GI recommendations  - will need to follow results

## 2017-02-08 NOTE — CONSULT NOTE ADULT - SUBJECTIVE AND OBJECTIVE BOX
Patient is a 76y old  Female who presents with a chief complaint of Bloody stools. (07 Feb 2017 16:29)      HPI:  76 years old female with PMH of HTN, A.Fib, dyslipidemia. Patient came to ER with complaints of fever , chills for over 1 month. Severe fatigue for about 1 weeks. Cam to ER 2 days ago. She was transfused and sent home as her  has parkinsons and needs care. . Patient states she  has low grade leveres 100.3 and if it goes to 101, she take tylenol. . Has no nausea, no vomiting, no abdominal pain. had 1 episode of non bloody diarrhea yesterday at  home followed by normal Bm in ER.  She per primary MD note pt reported 2 edpisodes of rectal bleeding. However, pt denies rectal bleeding to me. Lost 9 lbs in 4 months.  Had colonoscopy in November for abdominal pain.  The colonoscopy was unable to be completed ( unable to pass scope beyond the sigmoid). She had a virtual colonoscopy which showed persistant narrowing 6 cm of sigmoid. Pt saw Dr Mckenzie for this , but has no been back for follow up . Unclear what th plan was.  Baseline hemoglobin was 12 in September 2016 and 2 days ago hemoglobin was7.8    REVIEW OF SYSTEMS:  Constitutional: No fever, weight loss or fatigue  ENMT:  No difficulty hearing, tinnitus, vertigo; No sinus or throat pain  Respiratory: No cough, wheezing, chills or hemoptysis  Cardiovascular: No chest pain, palpitations, dizziness or leg swelling  Gastrointestinal: No abdominal or epigastric pain. No nausea, vomiting or hematemesis; No diarrhea or constipation. No melena or hematochezia.  Skin: No itching, burning, rashes or lesions   Musculoskeletal: No joint pain or swelling; No muscle, back or extremity pain    PAST MEDICAL & SURGICAL HISTORY:  Breast cancer: right lumpectomy RT/CT  Lung cancer: right CT/RT  GERD (gastroesophageal reflux disease)  Sinus problem  Hypothyroid  Hyperparathyroidism  Smoker  Hyperlipidemia  COPD (chronic obstructive pulmonary disease)  Anxiety  Palpitations  Atrial fibrillation: PAF  S/P lumpectomy, right breast: CT/RT  S/P partial lobectomy of lung: right      FAMILY HISTORY:  No pertinent family history in first degree relatives      SOCIAL HISTORY:  Smoking Status: [ ] Current, [ ] Former, [ ] Never  Pack Years:  [  ] EtOH  [  ] IVDA    MEDICATIONS:  MEDICATIONS  (STANDING):  flecainide 50milliGRAM(s) Oral every 12 hours  simvastatin 20milliGRAM(s) Oral at bedtime  ATENolol  Tablet 25milliGRAM(s) Oral daily  cholecalciferol 1000Unit(s) Oral daily  pantoprazole    Tablet 40milliGRAM(s) Oral before breakfast    MEDICATIONS  (PRN):  ALPRAZolam 0.25milliGRAM(s) Oral three times a day PRN anxiety      Allergies    adhesives (Rash)  Ceclor (Rash)  cephalosporins (Anaphylaxis)  codeine (Hives)  Demerol HCl (Hives)  penicillin (Short breath)  sulfa drugs (Hives)  Sulfac 10% (Anaphylaxis; Short breath)    Intolerances        Vital Signs Last 24 Hrs  T(C): 37.7, Max: 37.9 (02-07 @ 15:38)  T(F): 99.8, Max: 100.3 (02-07 @ 15:38)  HR: 74 (72 - 83)  BP: 106/56 (93/48 - 130/73)  BP(mean): 91 (91 - 91)  RR: 18 (16 - 18)  SpO2: 100% (94% - 100%)    I & Os for current day (as of 02-08 @ 08:01)  =============================================  IN: 480 ml / OUT: 0 ml / NET: 480 ml        PHYSICAL EXAM:    General: thin ; in no acute distress  HEENT: MMM, conjunctiva and sclera clear  Gastrointestinal: Soft, non-tender non-distended; Normal bowel sounds; No rebound or guarding  Extremities: Normal range of motion, No clubbing, cyanosis or edema  Neurological: Alert and oriented x3  Skin: Warm and dry. No obvious rash  rectal - no overt melena or blood. No masses      LABS:                        8.1    6.85  )-----------( 374      ( 08 Feb 2017 06:25 )             24.8                 RADIOLOGY & ADDITIONAL STUDIES:

## 2017-02-08 NOTE — CONSULT NOTE ADULT - SUBJECTIVE AND OBJECTIVE BOX
[facility] INFECTIOUS DISEASES AND INTERNAL MEDICINE OF [place]  =======================================================  Joe Shelton MD [facility]   Mak Goss MD  Diplomates American Board of Internal Medicine and Infectious Diseases  =======================================================    [facility]-[id number]  BRANNON GRANT is a 76y  Female   This 76 y.o. F with HTN, A.Fib, dyslipidemia, anxiety, prior incomplete colonoscopy due to inability to pass sigmoid in 2016,  who is here for workup of rectal bleeding/ bloody stools.     She has been seen in our office with Dr. Shelton for workup of fevers.  Also noted in her history is prior breast cancer in 2002, lung cancer/ scar cancer from old TB in 2003, ARUNA in [address]3y, prior XRT to tonsils as a child, hemithyroidectomy, hyperparathyroidism.  history of colovaginal fistula, but recent CT colonography is without fistula.          =======================================================  Past Medical & Surgical Hx:  =====================  PAST MEDICAL & SURGICAL HISTORY:  Breast cancer: right lumpectomy RT/CT  Lung cancer: right CT/RT  GERD (gastroesophageal reflux disease)  Sinus problem  Hypothyroid  Hyperparathyroidism  Smoker  Hyperlipidemia  COPD (chronic obstructive pulmonary disease)  Anxiety  Palpitations  Atrial fibrillation: PAF  S/P lumpectomy, right breast: CT/RT  S/P partial lobectomy of lung: right      Problem List:  ==========  HEALTH ISSUES - PROBLEM Dx:  Anemia: Anemia  Colovaginal fistula: Colovaginal fistula  Fever: Fever  Hyperlipidemia: Hyperlipidemia  Hypothyroid: Hypothyroid  Atrial fibrillation: Atrial fibrillation  Gastrointestinal hemorrhage, unspecified gastrointestinal hemorrhage type: Gastrointestinal hemorrhage, unspecified gastrointestinal hemorrhage type          Social Hx:  =======    FAMILY HISTORY:  No pertinent family history in first degree relatives      Allergies    adhesives (Rash)  Ceclor (Rash)  cephalosporins (Anaphylaxis)  codeine (Hives)  Demerol HCl (Hives)  penicillin (Short breath)  sulfa drugs (Hives)  Sulfac 10% (Anaphylaxis; Short breath)    Intolerances        MEDICATIONS  (STANDING):  flecainide 50milliGRAM(s) Oral every 12 hours  simvastatin 20milliGRAM(s) Oral at bedtime  ATENolol  Tablet 25milliGRAM(s) Oral daily  cholecalciferol 1000Unit(s) Oral daily  pantoprazole    Tablet 40milliGRAM(s) Oral before breakfast    MEDICATIONS  (PRN):  ALPRAZolam 0.25milliGRAM(s) Oral three times a day PRN anxiety        ANTIBIOTICS:      =======================================================  REVIEW OF SYSTEMS:  CONSTITUTIONAL:  as per HPI  HEENT:  Eyes:  No diplopia or blurred vision. ENT:  No earache, sore throat or runny nose.  CARDIOVASCULAR:  No pressure, squeezing, strangling, tightness, heaviness or aching about the chest, neck, axilla or epigastrium.  RESPIRATORY:  No cough, shortness of breath, PND or orthopnea.  GASTROINTESTINAL:  No nausea, vomiting or diarrhea.  GENITOURINARY:  No dysuria, frequency or urgency. No Blood in urine  MUSCULOSKELETAL:  no joint aches, no muscle pain  SKIN:  No change in skin, hair or nails.  NEUROLOGIC:  No paresthesias, fasciculations, seizures or weakness.  PSYCHIATRIC:  No disorder of thought or mood.  ENDOCRINE:  No heat or cold intolerance, polyuria or polydipsia.  HEMATOLOGICAL:  No easy bruising or bleeding.     =======================================================  I&O's Detail    I & Os for current day (as of 2017 08:43)  =============================================  IN:    Oral Fluid: 480 ml    Total IN: 480 ml  ---------------------------------------------  OUT:    Total OUT: 0 ml  ---------------------------------------------  Total NET: 480 ml      Physical Exam:  ============  Vital Signs Last 24 Hrs  T(C): 37.7, Max: 37.9 ( @ 15:38)  T(F): 99.8, Max: 100.3 ( @ 15:38)  HR: 74 (72 - 83)  BP: 106/56 (93/48 - 130/73)  BP(mean): 91 (91 - 91)  RR: 18 (16 - 18)  SpO2: 100% (94% - 100%)    Weight (kg): 50.3 ( @ 12:43)  GEN: NAD, pleasant  HEENT: normocephalic and atraumatic. EOMI. STEPHANIE.    NECK: Supple. No carotid bruits.  No lymphadenopathy or thyromegaly.  LUNGS: Clear to auscultation.  HEART: Regular rate and rhythm without murmur.  ABDOMEN: Soft, nontender, and nondistended.  Positive bowel sounds.    EXTREMITIES: Without any cyanosis, clubbing, rash, lesions or edema.  NEUROLOGIC: Cranial nerves II through XII are grossly intact.  PSYCHIATRIC: Appropriate affect .  SKIN: No ulceration or induration present.    =======================================================  Labs:  ====   2017 14:13    139    |  100    |  20.0   ----------------------------<  101    4.3     |  27.0   |  0.73     Ca    10.9       2017 14:13    TPro  8.2    /  Alb  3.2    /  TBili  0.4    /  DBili  x      /  AST  43     /  ALT  33     /  AlkPhos  109    2017 14:13                          8.1    6.85  )-----------( 374      ( 2017 06:25 )             24.8         Urinalysis Basic - ( 2017 16:11 )    Color: Yellow / Appearance: Slightly Turbid / S.015 / pH: x  Gluc: x / Ketone: Trace  / Bili: Negative / Urobili: 4 mg/dL   Blood: x / Protein: 30 mg/dL / Nitrite: Negative   Leuk Esterase: Moderate / RBC: 3-5 /HPF / WBC >50   Sq Epi: x / Non Sq Epi: Few / Bacteria: Moderate      LIVER FUNCTIONS - ( 2017 14:13 )  Alb: 3.2 g/dL / Pro: 8.2 g/dL / ALK PHOS: 109 U/L / ALT: 33 U/L / AST: 43 U/L / GGT: x               CAPILLARY BLOOD GLUCOSE  304 (2017 21:05)        RECENT CULTURES:   .Urine Clean Catch (Midstream) XXXX XXXX   Culture grew 3 or more types of organisms which indicate  collection contamination; consider recollection only if clinically  indicated.  notify IVETTE Ramsey     .Blood Blood XXXX XXXX   No growth at 48 hours [facility] [place] [facility]  =======================================================  Joe Shelton MD [facility]   Mak Goss MD  Diplomates American Board of Internal Medicine and Infectious Diseases  =======================================================    [facility]-[id number]  BRANNON GRANT is a 76y  Female   This 76 y.o. F with HTN, A.Fib, dyslipidemia, anxiety, prior incomplete colonoscopy due to inability to pass sigmoid in 2016,  who is here for workup of rectal bleeding/ bloody stools.     She has been seen in our office with Dr. Shelton for workup of fevers.  Also noted in her history is prior breast cancer in 2002, lung cancer/ scar cancer from old TB in 2003, ARUNA in , prior XRT to tonsils as a child, hemithyroidectomy, hyperparathyroidism.  suspected colovaginal fistula, but recent CT colonography is without fistula.  After her recent colonography, she has not been followed up.      She was see in our office on 17 and labs were done at [facility] labs on 2/3/17 - ESR of 41, CRP of 23, Blood cultures x 1 set with Coag negative staph from aerobic bottle. She had SPEP done which showed a M-spike of 0.2.  (SCANNED TO ALPHA SYSTEMS) Repeat blood cultures at [facility] ER 2/5/17 x 2 sets have been negative and in process.     patient still reports intermittent fever, but here primarily for rectal bleeding, workup by GI in process.       Denies travel.  No raw/ unpasteurized milk.  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   WEIGHT loss of 10 lbs since 2016.     =======================================================  Past Medical & Surgical Hx:  =====================  PAST MEDICAL & SURGICAL HISTORY:  Breast cancer: right lumpectomy RT/CT  Lung cancer: right CT/RT  GERD (gastroesophageal reflux disease)  Sinus problem  Hypothyroid  Hyperparathyroidism  Smoker  Hyperlipidemia  COPD (chronic obstructive pulmonary disease)  Anxiety  Palpitations  Atrial fibrillation: PAF  S/P lumpectomy, right breast: CT/RT  S/P partial lobectomy of lung: right      Problem List:  ==========  HEALTH ISSUES - PROBLEM Dx:  Anemia: Anemia  Colovaginal fistula: Colovaginal fistula  Fever: Fever  Hyperlipidemia: Hyperlipidemia  Hypothyroid: Hypothyroid  Atrial fibrillation: Atrial fibrillation  Gastrointestinal hemorrhage, unspecified gastrointestinal hemorrhage type: Gastrointestinal hemorrhage, unspecified gastrointestinal hemorrhage type       Social Hx:  =======  no current toxic habits    FAMILY HISTORY:  No pertinent family history in first degree relatives      Allergies    adhesives (Rash)  Ceclor (Rash)  cephalosporins (Anaphylaxis)  codeine (Hives)  Demerol HCl (Hives)  penicillin (Short breath)  sulfa drugs (Hives)  Sulfac 10% (Anaphylaxis; Short breath)    Intolerances       MEDICATIONS  (STANDING):  flecainide 50milliGRAM(s) Oral every 12 hours  simvastatin 20milliGRAM(s) Oral at bedtime  ATENolol  Tablet 25milliGRAM(s) Oral daily  cholecalciferol 1000Unit(s) Oral daily  pantoprazole    Tablet 40milliGRAM(s) Oral before breakfast    MEDICATIONS  (PRN):  ALPRAZolam 0.25milliGRAM(s) Oral three times a day PRN anxiety          =======================================================  REVIEW OF SYSTEMS:  CONSTITUTIONAL:  as per HPI  HEENT:  Eyes:  No diplopia or blurred vision. ENT:  No earache, sore throat or runny nose.  CARDIOVASCULAR:  No pressure, squeezing, strangling, tightness, heaviness or aching about the chest, neck, axilla or epigastrium.  RESPIRATORY:  No cough, shortness of breath, PND or orthopnea.  GASTROINTESTINAL:  No nausea, vomiting or diarrhea.  GENITOURINARY:  No dysuria, frequency or urgency. No Blood in urine  MUSCULOSKELETAL:  no joint aches, no muscle pain  SKIN:  No change in skin, hair or nails.  NEUROLOGIC:  No paresthesias, fasciculations, seizures or weakness.  PSYCHIATRIC:  No disorder of thought or mood.  ENDOCRINE:  No heat or cold intolerance, polyuria or polydipsia.  HEMATOLOGICAL:  No easy bruising or bleeding.     =======================================================  I&O's Detail    I & Os for current day (as of 2017 08:43)  =============================================  IN:    Oral Fluid: 480 ml    Total IN: 480 ml  ---------------------------------------------  OUT:    Total OUT: 0 ml  ---------------------------------------------  Total NET: 480 ml      Physical Exam:  ============  Vital Signs Last 24 Hrs  T(C): 37.7, Max: 37.9 ( @ 15:38)  T(F): 99.8, Max: 100.3 ( @ 15:38)  HR: 74 (72 - 83)  BP: 106/56 (93/48 - 130/73)  BP(mean): 91 (91 - 91)  RR: 18 (16 - 18)  SpO2: 100% (94% - 100%)    Weight (kg): 50.3 ( @ 12:43)  GEN: NAD, pleasant, thin, no distress  HEENT: normocephalic and atraumatic. EOMI. STEPHANIE.    NECK: Supple. No carotid bruits.  slightly prominent submandibular LN  LUNGS: fair air entry, upper lung zone with scatter rhonchi  HEART: Regular rate and rhythm without murmur.  ABDOMEN: Soft, nontender, and nondistended.  Positive bowel sounds.  flat soft  rectal exam deferred - patient had several here with positive occult blood and no rectal tenderness  EXTREMITIES: Without any cyanosis, clubbing, rash, lesions or edema.  NEUROLOGIC: Cranial nerves II through XII are grossly intact.  PSYCHIATRIC: Appropriate affect .  SKIN: No ulceration or induration present.    =======================================================  Labs:  ====   2017 14:13    139    |  100    |  20.0   ----------------------------<  101    4.3     |  27.0   |  0.73     Ca    10.9       2017 14:13    TPro  8.2    /  Alb  3.2    /  TBili  0.4    /  DBili  x      /  AST  43     /  ALT  33     /  AlkPhos  109    2017 14:13                          8.1    6.85  )-----------( 374      ( 2017 06:25 )             24.8         Urinalysis Basic - ( 2017 16:11 )    Color: Yellow / Appearance: Slightly Turbid / S.015 / pH: x  Gluc: x / Ketone: Trace  / Bili: Negative / Urobili: 4 mg/dL   Blood: x / Protein: 30 mg/dL / Nitrite: Negative   Leuk Esterase: Moderate / RBC: 3-5 /HPF / WBC >50   Sq Epi: x / Non Sq Epi: Few / Bacteria: Moderate      LIVER FUNCTIONS - ( 2017 14:13 )  Alb: 3.2 g/dL / Pro: 8.2 g/dL / ALK PHOS: 109 U/L / ALT: 33 U/L / AST: 43 U/L / GGT: x               CAPILLARY BLOOD GLUCOSE  304 (2017 21:05)        RECENT CULTURES:  - .Urine Clean Catch (Midstream) XXXX XXXX   Culture grew 3 or more types of organisms which indicate  collection contamination; consider recollection only if clinically  indicated.  notify IVETTE Ramsey     .Blood Blood XXXX XXXX   No growth at 48 hours      ================       EXAM:  CT COLONOGRAPHY DIAGNOSTIC                          PROCEDURE DATE:  11/10/2016        INTERPRETATION:  History:Failed direct colonoscopy. Obstruction   encountered at the level of the sigmoid colon. Patient with GI bleeding..    Date and time of exam: 11/10/2016 1:30 PM.    Comparison: No prior exam.    Technique: The patient underwent routine bowel cleansing preparation   prior to the exam.    The colon was insufflated with air and the patient   was scanned in supine and prone positions.  2D and 3D images were then   reviewed.    Findings: Pulmonary survey of the abdomen and pelvis demonstrates pleural   thickening at the right lung base with loss of volume in the right   hemithorax and shift of the mediastinum to the right. These findings are   unchanged compared with a prior CT scan of the chest dated 10/12/2012.   There are several cysts in the liver, unchanged.    Bilateral renal cortical cysts. No evidence of hydronephrosis.    Atherosclerotic changes in the abdominal aorta.    There is extensive diverticulosis of the sigmoid colon. Less pronounced   diverticulosis is noted in the descending colon, transverse colon and   ascending colon. The colon is visualized from the anus to the cecum. The   ileocecal valve is identified. The appendix is not visualized. There is   marked circumferential thickening of the wall of the mid sigmoid colon   for a length of approximately 6 cm. This portion of the colon does not   distend on either supine or prone imaging.    There is a 7 mm polyp arising from the posterior wall of the mid   descending colon. No other evidence of polyp.    Impression:  Extensive colonic diverticulosis most pronounced in the sigmoid colon. A   6 cm length of mid sigmoid colon does not distend on supine and prone   images and cannot be further evaluated. A neoplasm at this site cannot be   excluded. This is presumably the site of obstruction on direct   colonoscopy.    7 mm polyp arising from the posterior wall of the mid descending colon..

## 2017-02-08 NOTE — CONSULT NOTE ADULT - SUBJECTIVE AND OBJECTIVE BOX
HPI:  76 years old female with PMH of HTN, A.Fib, dyslipidemia. Patient came to ER with persistent fever and chills for 1 month. Progressive fatigue for about 1 week. she was in theto ER 2/5/2017. She was transfused and sent home. She denies nausea, vomiting, or abdominal pain.Last colonoscopy in November for abdominal pain.  The colonoscopy was unable to be completed ( unable to pass scope beyond the sigmoid). She had a virtual colonoscopy which showed  narrowing 6 cm of sigmoid. Pt saw Dr Mckenzie who will follow up.    REVIEW OF SYSTEMS:  Constitutional: fever and fatigue  Respiratory: No cough, wheezing, chills or hemoptysis  Cardiovascular: No chest pain, palpitations, dizziness or leg swelling  Gastrointestinal: No abdominal or epigastric pain. No nausea, vomiting or hematemesis; No diarrhea or constipation. No melena or hematochezia.    PAST MEDICAL & SURGICAL HISTORY:  Breast cancer: right lumpectomy RT/CT  Lung cancer: right CT/RT  GERD (gastroesophageal reflux disease)  Sinus problem  Hypothyroid  Hyperparathyroidism  Smoker  Hyperlipidemia  COPD (chronic obstructive pulmonary disease)  Anxiety  Palpitations  Atrial fibrillation: PAF  S/P lumpectomy, right breast: CT/RT  S/P partial lobectomy of lung: right        Vital Signs Last 24 Hrs  T(C): 37.7, Max: 37.9 (02-07 @ 15:38)  T(F): 99.8, Max: 100.3 (02-07 @ 15:38)  HR: 74 (72 - 83)  BP: 106/56 (93/48 - 130/73)  BP(mean): 91 (91 - 91)  RR: 18 (16 - 18)  SpO2: 100% (94% - 100%)    PHYSICAL EXAM:    General: thin ; NAD  Gastrointestinal: Soft, non-tender non-distended; Normal bowel sounds; No rebound or guarding  Neurological: Alert and oriented x3  Skin: Warm and dry. No obvious rash  rectal : deffered  LABS:                        8.1    6.85  )-----------( 374      ( 08 Feb 2017 06:25 )             24.8     C Diff: Pending            RADIOLOGY & ADDITIONAL STUDIES:       Assessment and Recommendation:   Problem/Recommendation - 1:  Problem: Anemia. Recommendation: Patient denying rectal bleeding to me. However, pt with fevers at home. Virtual colonoscopy showing  persistant narrowing in sigmoid. Having BM's so no obstructed.      Would do Ct with oral contrast only. Stool studies to be collected if pt has diarrhea  Please call surgery ( Dr Mckenzie) to revaluate - ?diverticular stricutre vs mass.    Electronic Signatures:  Rosangela Henriquez ()  (Signed 08-Feb-2017 08:15)  	Authored: Consult Note, Referral/Consultation, Subjective and Objective, Assessment and Recommendation

## 2017-02-08 NOTE — PROGRESS NOTE ADULT - ASSESSMENT
76 years old female with PMH of HTN, A.Fib, dyslipidemia, lung/breast cancer and abnormal colonoscopy in 11/2016 admitted with GI bleed.    Problem/Plan - 1:  ·  Problem: Gastrointestinal hemorrhage, unspecified gastrointestinal hemorrhage type.  Plan: hemodynamically stable.  ASA discontinued for now.  started on PPI.  repeat H/H in am.  GI consult- .     Problem/Plan - 2:  ·  Problem: Atrial fibrillation.  Plan: no acute issues.  continue Flecainide and Atenolol.  resume ASA as able.     Problem/Plan - 3:  ·  Problem: Hypothyroid.  Plan: not on any medication.  TSH ordered.  re-eval.     Problem/Plan - 4:  ·  Problem: Hyperlipidemia.  Plan: on Zocor.     Problem/Plan - 5:  ·  Problem: Fever.  Plan: ?etiology.  had positive UA on 2/5.  follows with .  will hold off antibiotics for now get ID input.     Problem/Plan - 6:  Problem: Colovaginal fistula. Plan: will follow up with surgery. 76 years old female with PMH of HTN, A.Fib, dyslipidemia, lung/breast cancer and abnormal colonoscopy in 11/2016 admitted with GI bleed.    Problem/Plan - 1:  ·  Problem: Gastrointestinal hemorrhage, unspecified gastrointestinal hemorrhage type. abnormal colonoscopy- diverticular stricture vs. Mass  Plan: hemodynamically stable. continue to monitor vitals and H/H  ASA discontinued for now.  started on PPI.  CT with PO contrast  Surgical consult called    Problem/Plan - 2:  ·  Problem: Atrial fibrillation.  Plan: no acute issues.  continue Flecainide and Atenolol.  resume ASA as able.     Problem/Plan - 3:  ·  Problem: Hypothyroid.  Plan: not on any medication.  TSH wnl    Problem/Plan - 4:  ·  Problem: Hyperlipidemia.  Plan: on Zocor.     Problem/Plan - 5:  ·  Problem: Fever.  Plan: ?etiology.  had positive UA on 2/5.  ID following    Problem/Plan - 6:  Problem: Colovaginal fistula. Plan: will follow up with surgery. 76 years old female with PMH of HTN, A.Fib, dyslipidemia, lung/breast cancer and abnormal colonoscopy in 11/2016 admitted with GI bleed.    >Anemia / Gastrointestinal hemorrhage, unspecified gastrointestinal hemorrhage type. abnormal colonoscopy- diverticular stricture vs. Mass  Plan: hemodynamically stable. continue to monitor vitals and H/H.  ASA discontinued for now. started on PPI. CT with PO contrast.  Surgical consult called to Dr. Mckenzie.  Observe on medical bed.   > Atrial fibrillation - rate controlled, continue Flecainide and Atenolol.  Holding antiplatelets, no anticoagulation due to bleeding.  > Hyperlipidemia.  Plan: on Zocor.   > Fever - no clear source of infection.  Noted M spike on recent SPEP, d/w Dr. Trinh.  Hematology consult requested with Dr. Avalos.  d/w patient in agreement.

## 2017-02-09 DIAGNOSIS — A04.7 ENTEROCOLITIS DUE TO CLOSTRIDIUM DIFFICILE: ICD-10-CM

## 2017-02-09 LAB
ANION GAP SERPL CALC-SCNC: 11 MMOL/L — SIGNIFICANT CHANGE UP (ref 5–17)
BUN SERPL-MCNC: 13 MG/DL — SIGNIFICANT CHANGE UP (ref 8–20)
CALCIUM SERPL-MCNC: 10.4 MG/DL — HIGH (ref 8.6–10.2)
CEA SERPL-MCNC: 1.9 NG/ML — SIGNIFICANT CHANGE UP (ref 0–3.8)
CHLORIDE SERPL-SCNC: 99 MMOL/L — SIGNIFICANT CHANGE UP (ref 98–107)
CO2 SERPL-SCNC: 26 MMOL/L — SIGNIFICANT CHANGE UP (ref 22–29)
CREAT SERPL-MCNC: 0.87 MG/DL — SIGNIFICANT CHANGE UP (ref 0.5–1.3)
GLUCOSE SERPL-MCNC: 135 MG/DL — HIGH (ref 70–115)
HCT VFR BLD CALC: 23.8 % — LOW (ref 37–47)
HGB BLD-MCNC: 7.7 G/DL — LOW (ref 12–16)
MCHC RBC-ENTMCNC: 29.8 PG — SIGNIFICANT CHANGE UP (ref 27–31)
MCHC RBC-ENTMCNC: 32.4 G/DL — SIGNIFICANT CHANGE UP (ref 32–36)
MCV RBC AUTO: 92.2 FL — SIGNIFICANT CHANGE UP (ref 81–99)
PLATELET # BLD AUTO: 393 K/UL — SIGNIFICANT CHANGE UP (ref 150–400)
POTASSIUM SERPL-MCNC: 4 MMOL/L — SIGNIFICANT CHANGE UP (ref 3.5–5.3)
POTASSIUM SERPL-SCNC: 4 MMOL/L — SIGNIFICANT CHANGE UP (ref 3.5–5.3)
RBC # BLD: 2.58 M/UL — LOW (ref 4.4–5.2)
RBC # FLD: 14.3 % — SIGNIFICANT CHANGE UP (ref 11–15.6)
SODIUM SERPL-SCNC: 136 MMOL/L — SIGNIFICANT CHANGE UP (ref 135–145)
WBC # BLD: 5.58 K/UL — SIGNIFICANT CHANGE UP (ref 4.8–10.8)
WBC # FLD AUTO: 5.58 K/UL — SIGNIFICANT CHANGE UP (ref 4.8–10.8)

## 2017-02-09 PROCEDURE — 99233 SBSQ HOSP IP/OBS HIGH 50: CPT

## 2017-02-09 RX ORDER — INFLUENZA VIRUS VACCINE 15; 15; 15; 15 UG/.5ML; UG/.5ML; UG/.5ML; UG/.5ML
0.5 SUSPENSION INTRAMUSCULAR ONCE
Qty: 0 | Refills: 0 | Status: COMPLETED | OUTPATIENT
Start: 2017-02-09 | End: 2017-02-09

## 2017-02-09 RX ADMIN — Medication 125 MILLIGRAM(S): at 06:43

## 2017-02-09 RX ADMIN — Medication 125 MILLIGRAM(S): at 13:08

## 2017-02-09 RX ADMIN — Medication 650 MILLIGRAM(S): at 13:07

## 2017-02-09 RX ADMIN — SIMVASTATIN 20 MILLIGRAM(S): 20 TABLET, FILM COATED ORAL at 21:58

## 2017-02-09 RX ADMIN — ATENOLOL 25 MILLIGRAM(S): 25 TABLET ORAL at 06:41

## 2017-02-09 RX ADMIN — Medication 50 MILLIGRAM(S): at 18:17

## 2017-02-09 RX ADMIN — Medication 125 MILLIGRAM(S): at 00:07

## 2017-02-09 RX ADMIN — Medication 50 MILLIGRAM(S): at 06:42

## 2017-02-09 RX ADMIN — Medication 125 MILLIGRAM(S): at 18:17

## 2017-02-09 RX ADMIN — Medication 2 MILLIGRAM(S): at 02:28

## 2017-02-09 RX ADMIN — Medication 1000 UNIT(S): at 13:07

## 2017-02-09 RX ADMIN — Medication 650 MILLIGRAM(S): at 02:10

## 2017-02-09 RX ADMIN — SIMVASTATIN 20 MILLIGRAM(S): 20 TABLET, FILM COATED ORAL at 00:06

## 2017-02-09 NOTE — PROGRESS NOTE ADULT - PROBLEM SELECTOR PLAN 1
C diff positivity  - will treat as if C diff infection, However, no colitis on CT scan  - vanco PO x 14 days C diff positivity  - will treat as if C diff infection, However, no colitis on CT scan, and no prolong course of diarrhea throughout the 41 days of fever.   - vanco PO x 14 days    - oncological markers sent.  - Gen surgery Dr. Mckenzie's group following.  possible need for ex-lap if not other answers

## 2017-02-09 NOTE — PROGRESS NOTE ADULT - ASSESSMENT
This 76 y.o. woman with multiple medical issues here for workup of anemia and rectal bleeding, also with fevers of unknown origin.   BLOOD cultures in process for this admission.   Ct abd pelvis showing only Sigmoid diverticulosis with chronic muscular hypertrophy, possible thickened cecum.  Procalcitonin < 0.23  C diff positivity        C diff positive

## 2017-02-09 NOTE — PROGRESS NOTE ADULT - SUBJECTIVE AND OBJECTIVE BOX
[facility] INFECTIOUS DISEASES AND INTERNAL MEDICINE OF [place]  =======================================================  Joe Shelton MD [facility]   Mak Goss MD  Diplomates American Board of Internal Medicine and Infectious Diseases  =======================================================    MRN-[id number]  BRANNON GRANT is a 76y  Female        chart reviewed. Patient seen and examined.   Cultures remain negative.  Loose BM and stool sent for C diff, positive.  patient started on PO vanco per GI service.     =======================================================  Allergies    adhesives (Rash)  Ceclor (Rash)  cephalosporins (Anaphylaxis)  codeine (Hives)  Demerol HCl (Hives)  penicillin (Short breath)  sulfa drugs (Hives)  Sulfac 10% (Anaphylaxis; Short breath)    Intolerances      ======================  MEDICATIONS  (STANDING):  flecainide 50milliGRAM(s) Oral every 12 hours  simvastatin 20milliGRAM(s) Oral at bedtime  ATENolol  Tablet 25milliGRAM(s) Oral daily  cholecalciferol 1000Unit(s) Oral daily  pantoprazole    Tablet 40milliGRAM(s) Oral before breakfast  vancomycin    Solution 125milliGRAM(s) Oral every 6 hours  influenza   Vaccine 0.5milliLiter(s) IntraMuscular once    MEDICATIONS  (PRN):  ALPRAZolam 0.25milliGRAM(s) Oral three times a day PRN anxiety  acetaminophen   Tablet 650milliGRAM(s) Oral every 6 hours PRN For Temp greater than 38 C (100.4 F)        =======================================================  REVIEW OF SYSTEMS:  CONSTITUTIONAL:  as per HPI  HEENT:  Eyes:  No diplopia or blurred vision. ENT:  No earache, sore throat or runny nose.  CARDIOVASCULAR:  No pressure, squeezing, strangling, tightness, heaviness or aching about the chest, neck, axilla or epigastrium.  RESPIRATORY:  No cough, shortness of breath, PND or orthopnea.  GASTROINTESTINAL:  No nausea, vomiting or diarrhea.  GENITOURINARY:  No dysuria, frequency or urgency. No Blood in urine  MUSCULOSKELETAL:  no joint aches, no muscle pain  SKIN:  No change in skin, hair or nails.  NEUROLOGIC:  No paresthesias, fasciculations, seizures or weakness.  PSYCHIATRIC:  No disorder of thought or mood.  ENDOCRINE:  No heat or cold intolerance, polyuria or polydipsia.  HEMATOLOGICAL:  No easy bruising or bleeding.     =======================================================  Physical Exam:  ============  Vital Signs Last 24 Hrs  T(C): 36.8, Max: 38.6 (02-08 @ 15:50)  T(F): 98.3, Max: 101.4 (02-08 @ 15:50)  HR: 68 (68 - 91)  BP: 122/64 (104/57 - 137/77)  BP(mean): --  RR: 16 (15 - 18)  SpO2: 96% (95% - 98%)    GEN: NAD, pleasant, thin, no distress  HEENT: normocephalic and atraumatic. EOMI. STEPHANIE.    NECK: Supple. No carotid bruits.  slightly prominent submandibular LN  LUNGS: fair air entry, upper lung zone with scatter rhonchi  HEART: Regular rate and rhythm without murmur.  ABDOMEN: Soft, nontender, and nondistended.  Positive bowel sounds.  flat soft  rectal exam deferred - patient had several here with positive occult blood and no rectal tenderness  EXTREMITIES: Without any cyanosis, clubbing, rash, lesions or edema.  NEUROLOGIC: Cranial nerves II through XII are grossly intact.  PSYCHIATRIC: Appropriate affect .  SKIN: No ulceration or induration present.    =======================================================  Labs:  ====   2017 06:48    136    |  99     |  13.0   ----------------------------<  135    4.0     |  26.0   |  0.87     Ca    10.4       2017 06:48    TPro  8.2    /  Alb  3.2    /  TBili  0.4    /  DBili  x      /  AST  43     /  ALT  33     /  AlkPhos  109    2017 14:13                          7.7    5.58  )-----------( 393      ( 2017 06:48 )             23.8     ***  C diff positive PCR       Urinalysis Basic - ( 2017 16:11 )    Color: Yellow / Appearance: Slightly Turbid / S.015 / pH: x  Gluc: x / Ketone: Trace  / Bili: Negative / Urobili: 4 mg/dL   Blood: x / Protein: 30 mg/dL / Nitrite: Negative   Leuk Esterase: Moderate / RBC: 3-5 /HPF / WBC >50   Sq Epi: x / Non Sq Epi: Few / Bacteria: Moderate      LIVER FUNCTIONS - ( 2017 14:13 )  Alb: 3.2 g/dL / Pro: 8.2 g/dL / ALK PHOS: 109 U/L / ALT: 33 U/L / AST: 43 U/L / GGT: x               CAPILLARY BLOOD GLUCOSE        RECENT CULTURES:   .Urine Clean Catch (Midstream) XXXX XXXX   Culture in progress           .Urine Clean Catch (Midstream) XXXX XXXX   Culture grew 3 or more types of organisms which indicate  collection contamination; consider recollection only if clinically  indicated.  notify IVETTE Ramsey     .Blood Blood XXXX XXXX   No growth at 48 hours      ================     EXAM:  CT ABDOMEN AND PELVIS OC                          PROCEDURE DATE:  2017        INTERPRETATION:  Clinical information: Fever, anemia, history of   diverticulosis    Comparison: Virtual colonoscopy 11/10    PROCEDURE:     CT of the Abdomen and Pelvis was performed without intravenous contrast.    Evaluation of abdominal and pelvic viscera, lymph nodes and vasculature   is limited without intravenous contrast.    FINDINGS:    LOWER CHEST: Chronic right pleural effusion and postsurgical changes   right lung base. 5 mm left lower lobe nodule, similar to 2012.    ABDOMEN:  LIVER: Multiple cysts and hypodensities small to characterize  BILE DUCTS: normal caliber  GALLBLADDER: no calcified gallstones. normal caliber wall  PANCREAS: within normal limits  SPLEEN: within normal limits  ADRENALS: within normal limits  KIDNEYS: within normal limits    PELVIS:  REPRODUCTIVE ORGANS: no pelvic masses, hysterectomy  BLADDER: within normal limits    BOWEL: Extensive sigmoid diverticulosis with chronic muscular   hypertrophy, similar to prior. No inflammatory changes or evidence of   bowel obstruction. Question mild thickening of the cecum. The appendix is   not visualized.  PERITONEUM: no ascites or free air, no fluid collection  RETROPERITONEUM: no enlarged retroperitoneal or pelvic nodes.    VESSELS: Multifocal vascular calcifications.  ABDOMINAL WALL: within normal limits.  MUSCULOSKELETAL: Right hip adductor intramuscular lipoma.    IMPRESSION:     No acute findings. Sigmoid diverticulosis with chronic muscular   hypertrophy without acute associated findings. Question mild thickening   of the cecum which may be inflammatory or infectious.        =============     EXAM:  CT COLONOGRAPHY DIAGNOSTIC                          PROCEDURE DATE:  11/10/2016        INTERPRETATION:  History:Failed direct colonoscopy. Obstruction   encountered at the level of the sigmoid colon. Patient with GI bleeding..    Date and time of exam: 11/10/2016 1:30 PM.    Comparison: No prior exam.    Technique: The patient underwent routine bowel cleansing preparation   prior to the exam.    The colon was insufflated with air and the patient   was scanned in supine and prone positions.  2D and 3D images were then   reviewed.    Findings: Pulmonary survey of the abdomen and pelvis demonstrates pleural   thickening at the right lung base with loss of volume in the right   hemithorax and shift of the mediastinum to the right. These findings are   unchanged compared with a prior CT scan of the chest dated 10/12/2012.   There are several cysts in the liver, unchanged.    Bilateral renal cortical cysts. No evidence of hydronephrosis.    Atherosclerotic changes in the abdominal aorta.    There is extensive diverticulosis of the sigmoid colon. Less pronounced   diverticulosis is noted in the descending colon, transverse colon and   ascending colon. The colon is visualized from the anus to the cecum. The   ileocecal valve is identified. The appendix is not visualized. There is   marked circumferential thickening of the wall of the mid sigmoid colon   for a length of approximately 6 cm. This portion of the colon does not   distend on either supine or prone imaging.    There is a 7 mm polyp arising from the posterior wall of the mid   descending colon. No other evidence of polyp.    Impression:  Extensive colonic diverticulosis most pronounced in the sigmoid colon. A   6 cm length of mid sigmoid colon does not distend on supine and prone   images and cannot be further evaluated. A neoplasm at this site cannot be   excluded. This is presumably the site of obstruction on direct   colonoscopy.    7 mm polyp arising from the posterior wall of the mid descending colon.. [facility] INFECTIOUS DISEASES AND INTERNAL MEDICINE OF [place]  =======================================================  Joe Shelton MD [facility]   Mak Goss MD  Diplomates American Board of Internal Medicine and Infectious Diseases  =======================================================    [id number]  BRANNON GRANT is a 76y  Female        chart reviewed. Patient seen and examined.   Cultures remain negative.  Loose BM and stool sent for C diff, positive.  patient started on PO vanco per GI service.   no recent antibiotics.  She had received at least 3 course of antibiotics, including macrobid and levaquin, throughout the 41 days of fever.  Only one episode of watery orange diarrhea at home on  or about 17    No current diarrhea.     =======================================================  Allergies    adhesives (Rash)  Ceclor (Rash)  cephalosporins (Anaphylaxis)  codeine (Hives)  Demerol HCl (Hives)  penicillin (Short breath)  sulfa drugs (Hives)  Sulfac 10% (Anaphylaxis; Short breath)    Intolerances      ======================  MEDICATIONS  (STANDING):  flecainide 50milliGRAM(s) Oral every 12 hours  simvastatin 20milliGRAM(s) Oral at bedtime  ATENolol  Tablet 25milliGRAM(s) Oral daily  cholecalciferol 1000Unit(s) Oral daily  pantoprazole    Tablet 40milliGRAM(s) Oral before breakfast  vancomycin    Solution 125milliGRAM(s) Oral every 6 hours  influenza   Vaccine 0.5milliLiter(s) IntraMuscular once    MEDICATIONS  (PRN):  ALPRAZolam 0.25milliGRAM(s) Oral three times a day PRN anxiety  acetaminophen   Tablet 650milliGRAM(s) Oral every 6 hours PRN For Temp greater than 38 C (100.4 F)        =======================================================  REVIEW OF SYSTEMS:  CONSTITUTIONAL:  as per HPI  HEENT:  Eyes:  No diplopia or blurred vision. ENT:  No earache, sore throat or runny nose.  CARDIOVASCULAR:  No pressure, squeezing, strangling, tightness, heaviness or aching about the chest, neck, axilla or epigastrium.  RESPIRATORY:  No cough, shortness of breath, PND or orthopnea.  GASTROINTESTINAL:   AS ABOVE  GENITOURINARY:  No dysuria, frequency or urgency. No Blood in urine  MUSCULOSKELETAL:  no joint aches, no muscle pain  SKIN:  No change in skin, hair or nails.  NEUROLOGIC:  No paresthesias, fasciculations, seizures or weakness.  PSYCHIATRIC:  No disorder of thought or mood.  ENDOCRINE:  No heat or cold intolerance, polyuria or polydipsia.  HEMATOLOGICAL:  No easy bruising or bleeding.     =======================================================  Physical Exam:  ============  Vital Signs Last 24 Hrs  T(C): 36.8, Max: 38.6 (02-08 @ 15:50)  T(F): 98.3, Max: 101.4 (02-08 @ 15:50)  HR: 68 (68 - 91)  BP: 122/64 (104/57 - 137/77)  BP(mean): --  RR: 16 (15 - 18)  SpO2: 96% (95% - 98%)    GEN: NAD, pleasant, thin, no distress  HEENT: normocephalic and atraumatic. EOMI. STEPHANIE.    NECK: Supple. No carotid bruits.  slightly prominent submandibular LN  LUNGS: fair air entry, upper lung zone with scatter rhonchi  HEART: Regular rate and rhythm without murmur.  ABDOMEN: Soft, nontender, and nondistended.  Positive bowel sounds.  flat soft  rectal exam deferred - patient had several here with positive occult blood and no rectal tenderness  EXTREMITIES: Without any cyanosis, clubbing, rash, lesions or edema.  NEUROLOGIC: Cranial nerves II through XII are grossly intact.  PSYCHIATRIC: Appropriate affect .  SKIN: No ulceration or induration present.    =======================================================  Labs:  ====   2017 06:48    136    |  99     |  13.0   ----------------------------<  135    4.0     |  26.0   |  0.87     Ca    10.4       2017 06:48    TPro  8.2    /  Alb  3.2    /  TBili  0.4    /  DBili  x      /  AST  43     /  ALT  33     /  AlkPhos  109    2017 14:13                          7.7    5.58  )-----------( 393      ( 2017 06:48 )             23.8     ***  C diff positive PCR       Urinalysis Basic - ( 2017 16:11 )    Color: Yellow / Appearance: Slightly Turbid / S.015 / pH: x  Gluc: x / Ketone: Trace  / Bili: Negative / Urobili: 4 mg/dL   Blood: x / Protein: 30 mg/dL / Nitrite: Negative   Leuk Esterase: Moderate / RBC: 3-5 /HPF / WBC >50   Sq Epi: x / Non Sq Epi: Few / Bacteria: Moderate      LIVER FUNCTIONS - ( 2017 14:13 )  Alb: 3.2 g/dL / Pro: 8.2 g/dL / ALK PHOS: 109 U/L / ALT: 33 U/L / AST: 43 U/L / GGT: x               CAPILLARY BLOOD GLUCOSE        RECENT CULTURES:   .Urine Clean Catch (Midstream) XXXX XXXX   Culture in progress           .Urine Clean Catch (Midstream) XXXX XXXX   Culture grew 3 or more types of organisms which indicate  collection contamination; consider recollection only if clinically  indicated.  notify PA Eric Braulio     .Blood Blood XXXX XXXX   No growth at 48 hours x2 sets      ================     EXAM:  CT ABDOMEN AND PELVIS OC                          PROCEDURE DATE:  2017        INTERPRETATION:  Clinical information: Fever, anemia, history of   diverticulosis    Comparison: Virtual colonoscopy 11/10    PROCEDURE:     CT of the Abdomen and Pelvis was performed without intravenous contrast.    Evaluation of abdominal and pelvic viscera, lymph nodes and vasculature   is limited without intravenous contrast.    FINDINGS:    LOWER CHEST: Chronic right pleural effusion and postsurgical changes   right lung base. 5 mm left lower lobe nodule, similar to 2012.    ABDOMEN:  LIVER: Multiple cysts and hypodensities small to characterize  BILE DUCTS: normal caliber  GALLBLADDER: no calcified gallstones. normal caliber wall  PANCREAS: within normal limits  SPLEEN: within normal limits  ADRENALS: within normal limits  KIDNEYS: within normal limits    PELVIS:  REPRODUCTIVE ORGANS: no pelvic masses, hysterectomy  BLADDER: within normal limits    BOWEL: Extensive sigmoid diverticulosis with chronic muscular   hypertrophy, similar to prior. No inflammatory changes or evidence of   bowel obstruction. Question mild thickening of the cecum. The appendix is   not visualized.  PERITONEUM: no ascites or free air, no fluid collection  RETROPERITONEUM: no enlarged retroperitoneal or pelvic nodes.    VESSELS: Multifocal vascular calcifications.  ABDOMINAL WALL: within normal limits.  MUSCULOSKELETAL: Right hip adductor intramuscular lipoma.    IMPRESSION:     No acute findings. Sigmoid diverticulosis with chronic muscular   hypertrophy without acute associated findings. Question mild thickening   of the cecum which may be inflammatory or infectious.        =============     EXAM:  CT COLONOGRAPHY DIAGNOSTIC                          PROCEDURE DATE:  11/10/2016        INTERPRETATION:  History:Failed direct colonoscopy. Obstruction   encountered at the level of the sigmoid colon. Patient with GI bleeding..    Date and time of exam: 11/10/2016 1:30 PM.    Comparison: No prior exam.    Technique: The patient underwent routine bowel cleansing preparation   prior to the exam.    The colon was insufflated with air and the patient   was scanned in supine and prone positions.  2D and 3D images were then   reviewed.    Findings: Pulmonary survey of the abdomen and pelvis demonstrates pleural   thickening at the right lung base with loss of volume in the right   hemithorax and shift of the mediastinum to the right. These findings are   unchanged compared with a prior CT scan of the chest dated 10/12/2012.   There are several cysts in the liver, unchanged.    Bilateral renal cortical cysts. No evidence of hydronephrosis.    Atherosclerotic changes in the abdominal aorta.    There is extensive diverticulosis of the sigmoid colon. Less pronounced   diverticulosis is noted in the descending colon, transverse colon and   ascending colon. The colon is visualized from the anus to the cecum. The   ileocecal valve is identified. The appendix is not visualized. There is   marked circumferential thickening of the wall of the mid sigmoid colon   for a length of approximately 6 cm. This portion of the colon does not   distend on either supine or prone imaging.    There is a 7 mm polyp arising from the posterior wall of the mid   descending colon. No other evidence of polyp.    Impression:  Extensive colonic diverticulosis most pronounced in the sigmoid colon. A   6 cm length of mid sigmoid colon does not distend on supine and prone   images and cannot be further evaluated. A neoplasm at this site cannot be   excluded. This is presumably the site of obstruction on direct   colonoscopy.    7 mm polyp arising from the posterior wall of the mid descending colon..

## 2017-02-09 NOTE — PROGRESS NOTE ADULT - PROBLEM SELECTOR PLAN 1
On vanco. ID following ( has been having fevers as outpatient)  low fiber diet   ? need for exlap by surgery /GYN when C difficle resolves- sigmoid narrowing, anemia- r/o mass

## 2017-02-09 NOTE — PROGRESS NOTE ADULT - SUBJECTIVE AND OBJECTIVE BOX
Patient is a 76y old  Female who presents with a chief complaint of Bloody stools. (07 Feb 2017 16:29)      HPI:  76 years old female with PMH of HTN, A.Fib, dyslipidemia. Hx  of sigmoid narrowing of unknown etiology.  Had Ct of abdomen which showed diverticulosis, no masses. No mention of colovaginal fistula, but pt states she has stool from vaginal canal . Has been evaluated for this as outpatient by Dr carvalho ( chronic complaint).  No fistula seen on colonoscopy in November.  Had 1 episodes of diarrhea yesterday with no rectal bleeding. Hgb 7.7 + C diff. Now on vanco. No abdominal pain.    REVIEW OF SYSTEMS:  Constitutional: No fever, weight loss or fatigue  ENMT:  No difficulty hearing, tinnitus, vertigo; No sinus or throat pain  Respiratory: No cough, wheezing, chills or hemoptysis  Cardiovascular: No chest pain, palpitations, dizziness or leg swelling  Gastrointestinal: No abdominal or epigastric pain. No nausea, vomiting or hematemesis; No diarrhea or constipation. No melena or hematochezia.  Skin: No itching, burning, rashes or lesions   Musculoskeletal: No joint pain or swelling; No muscle, back or extremity pain    PAST MEDICAL & SURGICAL HISTORY:  Breast cancer: right lumpectomy RT/CT  Lung cancer: right CT/RT  GERD (gastroesophageal reflux disease)  Sinus problem  Hypothyroid  Hyperparathyroidism  Smoker  Hyperlipidemia  COPD (chronic obstructive pulmonary disease)  Anxiety  Palpitations  Atrial fibrillation: PAF  S/P lumpectomy, right breast: CT/RT  S/P partial lobectomy of lung: right      FAMILY HISTORY:  No pertinent family history in first degree relatives      SOCIAL HISTORY:  Smoking Status: [ ] Current, [ ] Former, [ ] Never  Pack Years:  [  ] EtOH  [  ] IVDA    MEDICATIONS:  MEDICATIONS  (STANDING):  flecainide 50milliGRAM(s) Oral every 12 hours  simvastatin 20milliGRAM(s) Oral at bedtime  ATENolol  Tablet 25milliGRAM(s) Oral daily  cholecalciferol 1000Unit(s) Oral daily  pantoprazole    Tablet 40milliGRAM(s) Oral before breakfast  vancomycin    Solution 125milliGRAM(s) Oral every 6 hours    MEDICATIONS  (PRN):  ALPRAZolam 0.25milliGRAM(s) Oral three times a day PRN anxiety  acetaminophen   Tablet 650milliGRAM(s) Oral every 6 hours PRN For Temp greater than 38 C (100.4 F)      Allergies    adhesives (Rash)  Ceclor (Rash)  cephalosporins (Anaphylaxis)  codeine (Hives)  Demerol HCl (Hives)  penicillin (Short breath)  sulfa drugs (Hives)  Sulfac 10% (Anaphylaxis; Short breath)    Intolerances        Vital Signs Last 24 Hrs  T(C): 36.8, Max: 38.6 (02-08 @ 15:50)  T(F): 98.3, Max: 101.4 (02-08 @ 15:50)  HR: 89 (77 - 91)  BP: 128/71 (104/57 - 137/77)  BP(mean): --  RR: 18 (15 - 18)  SpO2: 97% (94% - 98%)        PHYSICAL EXAM:    General: Well developed; well nourished; in no acute distress  HEENT: MMM, conjunctiva and sclera clear  Gastrointestinal: Soft, non-tender non-distended; Normal bowel sounds; No rebound or guarding  Extremities: Normal range of motion, No clubbing, cyanosis or edema  Neurological: Alert and oriented x3  Skin: Warm and dry. No obvious rash      LABS:                        7.7    5.58  )-----------( 393      ( 09 Feb 2017 06:48 )             23.8     09 Feb 2017 06:48    136    |  99     |  13.0   ----------------------------<  135    4.0     |  26.0   |  0.87     Ca    10.4       09 Feb 2017 06:48    TPro  8.2    /  Alb  3.2    /  TBili  0.4    /  DBili  x      /  AST  43     /  ALT  33     /  AlkPhos  109    / Amylase x      /Lipase x      07 Feb 2017 14:13              RADIOLOGY & ADDITIONAL STUDIES:     Patient is a 76y old  Female who presents with a chief complaint of Bloody stools. (07 Feb 2017 16:29)      HPI:  76 years old female with PMH of HTN, A.Fib, dyslipidemia, Anxiety and abnormal colonoscopy on 11/2016 presented to the ER for the evaluation of continued bloody stools associated with fatigue and generalized weakness. She was seen in the ER 2 days ago for anemia and received PRBC. She was sent home to follow up with her PCP. Patient reports 1 month of fever for which she was seen by . He ordered blood work but didn't put her on any antibiotic. Patient also reports poor appetites, loss of weight and constipation. Patient also has a h/o colovaginal fistula for which she was seen by   but no surgical intervention was offered (per patient). Denies any abdominal pain, nausea, vomiting, SOB, dizziness or lightheadedness. (07 Feb 2017 16:29)      REVIEW OF SYSTEMS:  Constitutional: No fever, weight loss or fatigue  ENMT:  No difficulty hearing, tinnitus, vertigo; No sinus or throat pain  Respiratory: No cough, wheezing, chills or hemoptysis  Cardiovascular: No chest pain, palpitations, dizziness or leg swelling  Gastrointestinal: No abdominal or epigastric pain. No nausea, vomiting or hematemesis; No diarrhea or constipation. No melena or hematochezia.  Skin: No itching, burning, rashes or lesions   Musculoskeletal: No joint pain or swelling; No muscle, back or extremity pain    PAST MEDICAL & SURGICAL HISTORY:  Breast cancer: right lumpectomy RT/CT  Lung cancer: right CT/RT  GERD (gastroesophageal reflux disease)  Sinus problem  Hypothyroid  Hyperparathyroidism  Smoker  Hyperlipidemia  COPD (chronic obstructive pulmonary disease)  Anxiety  Palpitations  Atrial fibrillation: PAF  S/P lumpectomy, right breast: CT/RT  S/P partial lobectomy of lung: right      FAMILY HISTORY:  No pertinent family history in first degree relatives      SOCIAL HISTORY:  Smoking Status: [ ] Current, [ ] Former, [ ] Never  Pack Years:  [  ] EtOH  [  ] IVDA    MEDICATIONS:  MEDICATIONS  (STANDING):  flecainide 50milliGRAM(s) Oral every 12 hours  simvastatin 20milliGRAM(s) Oral at bedtime  ATENolol  Tablet 25milliGRAM(s) Oral daily  cholecalciferol 1000Unit(s) Oral daily  pantoprazole    Tablet 40milliGRAM(s) Oral before breakfast  vancomycin    Solution 125milliGRAM(s) Oral every 6 hours    MEDICATIONS  (PRN):  ALPRAZolam 0.25milliGRAM(s) Oral three times a day PRN anxiety  acetaminophen   Tablet 650milliGRAM(s) Oral every 6 hours PRN For Temp greater than 38 C (100.4 F)      Allergies    adhesives (Rash)  Ceclor (Rash)  cephalosporins (Anaphylaxis)  codeine (Hives)  Demerol HCl (Hives)  penicillin (Short breath)  sulfa drugs (Hives)  Sulfac 10% (Anaphylaxis; Short breath)    Intolerances        Vital Signs Last 24 Hrs  T(C): 36.8, Max: 38.6 (02-08 @ 15:50)  T(F): 98.3, Max: 101.4 (02-08 @ 15:50)  HR: 89 (77 - 91)  BP: 128/71 (104/57 - 137/77)  BP(mean): --  RR: 18 (15 - 18)  SpO2: 97% (94% - 98%)        PHYSICAL EXAM:    General: Well developed; well nourished; in no acute distress  HEENT: MMM, conjunctiva and sclera clear  Gastrointestinal: Soft, non-tender non-distended; Normal bowel sounds; No rebound or guarding  Extremities: Normal range of motion, No clubbing, cyanosis or edema  Neurological: Alert and oriented x3  Skin: Warm and dry. No obvious rash      LABS:                        7.7    5.58  )-----------( 393      ( 09 Feb 2017 06:48 )             23.8     09 Feb 2017 06:48    136    |  99     |  13.0   ----------------------------<  135    4.0     |  26.0   |  0.87     Ca    10.4       09 Feb 2017 06:48    TPro  8.2    /  Alb  3.2    /  TBili  0.4    /  DBili  x      /  AST  43     /  ALT  33     /  AlkPhos  109    / Amylase x      /Lipase x      07 Feb 2017 14:13              RADIOLOGY & ADDITIONAL STUDIES:     No acute findings. Sigmoid diverticulosis with chronic muscular   hypertrophy without acute associated findings. Question mild thickening   of the cecum which may be inflammatory or infectious.IMPRESSION:

## 2017-02-09 NOTE — PROGRESS NOTE ADULT - SUBJECTIVE AND OBJECTIVE BOX
BRANNON GRANT    [id number]    76y      Female    HPI On admission: 77 y/o female with hx of HTN, Afib, dyslipidemia, Anxiety, and abnormal colonoscopy 2016 presented to ED for evaluation of continued bloody stools associated with fatigue and generalized weakness.  She had been seen in ED 2 days prior for anemia and transfused 2 units PrBC.  Pt reported one month of fever for which she was seen by Nikita.  He ordered a blood work up but did not put her on antibiotics.  Pt also reported poor appetite, loss of weight and constipation.  Pt denied any abdominal pain this morning.  C/o of chills, current temp 100.5,  Pt reports she had one questionable bloody BM on Monday and none since. She had been having regular BMs.      Pt on treatment for Cdif.  Notes diarrhea is improving.  denies complaints.  no abdominal pain.         PHYSICAL EXAM:    GENERAL: NAD, frail  HEENT: PERRL, +EOMI  NECK: soft, Supple, No JVD,   CHEST/LUNG: Clear to percussion, decreased bs at base on left  HEART: S1S2+, Regular rate and rhythm; No murmurs, rubs, or gallops  ABDOMEN: Soft, Nontender, Nondistended; Bowel sounds present  EXTREMITIES:  2+ Peripheral Pulses, No clubbing, cyanosis, or edema  SKIN: No rashes or lesions  NEURO: AAOX3, no focal deficits, no motor r sensory loss  PSYCH: normal mood        MEDICATIONS:  flecainide 50milliGRAM(s) Oral every 12 hours  simvastatin 20milliGRAM(s) Oral at bedtime  ALPRAZolam 0.25milliGRAM(s) Oral three times a day PRN  ATENolol  Tablet 25milliGRAM(s) Oral daily  cholecalciferol 1000Unit(s) Oral daily  pantoprazole    Tablet 40milliGRAM(s) Oral before breakfast  acetaminophen   Tablet 650milliGRAM(s) Oral every 6 hours PRN  vancomycin    Solution 125milliGRAM(s) Oral every 6 hours  influenza   Vaccine 0.5milliLiter(s) IntraMuscular once      VITALS:  Vital Signs Last 24 Hrs  T(C): 36.8, Max: 38.6 (02-08 @ 15:50)  T(F): 98.3, Max: 101.4 (02-08 @ 15:50)  HR: 68 (68 - 91)  BP: 122/64 (104/57 - 137/77)  BP(mean): --  RR: 16 (15 - 18)  SpO2: 96% (95% - 98%) Daily     Daily   CAPILLARY BLOOD GLUCOSE    I&O's Summary      LABS:                        7.7    5.58  )-----------( 393      ( 2017 06:48 )             23.8     2017 06:48    136    |  99     |  13.0   ----------------------------<  135    4.0     |  26.0   |  0.87     Ca    10.4       2017 06:48    TPro  8.2    /  Alb  3.2    /  TBili  0.4    /  DBili  x      /  AST  43     /  ALT  33     /  AlkPhos  109    2017 14:13      RECENT CULTURES:  - .Urine Clean Catch (Midstream) XXXX XXXX   Culture in progress     .Urine Clean Catch (Midstream) XXXX XXXX   Culture grew 3 or more types of organisms which indicate  collection contamination; consider recollection only if clinically  indicated.  notify IVETTE Ramsey     .Blood Blood XXXX XXXX   No growth at 48 hours        LIVER FUNCTIONS - ( 2017 14:13 )  Alb: 3.2 g/dL / Pro: 8.2 g/dL / ALK PHOS: 109 U/L / ALT: 33 U/L / AST: 43 U/L / GGT: x           Urinalysis Basic - ( 2017 16:11 )    Color: Yellow / Appearance: Slightly Turbid / S.015 / pH: x  Gluc: x / Ketone: Trace  / Bili: Negative / Urobili: 4 mg/dL   Blood: x / Protein: 30 mg/dL / Nitrite: Negative   Leuk Esterase: Moderate / RBC: 3-5 /HPF / WBC >50   Sq Epi: x / Non Sq Epi: Few / Bacteria: Moderate

## 2017-02-09 NOTE — PROGRESS NOTE ADULT - ASSESSMENT
76 years old female with PMH of HTN, A.Fib, dyslipidemia, lung/breast cancer and abnormal colonoscopy in 11/2016 admitted with GI bleed.    > Cdif colitis - sx improving on Vancomycin.  Continue Abx x 14d.  ID followup appreciated.   > Fever, Noted M spike on recent SPEP, d/w Dr. Trinh.  Hematology consult requested with Dr. Avalos.  >Anemia / Gastrointestinal hemorrhage, unspecified gastrointestinal hemorrhage type. abnormal colonoscopy- diverticular stricture vs. Mass  - h/o stable, off ASA.  Continue PPI.  Surgical evaluation appreciated.    > Atrial fibrillation - rate controlled, continue Flecainide and Atenolol.  Holding antiplatelets, no anticoagulation due to bleeding.  > Hyperlipidemia.  Plan: on Zocor.   d/w patient in agreement.

## 2017-02-10 ENCOUNTER — APPOINTMENT (OUTPATIENT)
Dept: INTERNAL MEDICINE | Facility: CLINIC | Age: 77
End: 2017-02-10

## 2017-02-10 ENCOUNTER — TRANSCRIPTION ENCOUNTER (OUTPATIENT)
Age: 77
End: 2017-02-10

## 2017-02-10 LAB
-  AMPICILLIN/SULBACTAM: SIGNIFICANT CHANGE UP
-  AMPICILLIN: SIGNIFICANT CHANGE UP
-  AMPICILLIN: SIGNIFICANT CHANGE UP
-  CEFAZOLIN: SIGNIFICANT CHANGE UP
-  CIPROFLOXACIN: SIGNIFICANT CHANGE UP
-  GENTAMICIN: SIGNIFICANT CHANGE UP
-  LEVOFLOXACIN: SIGNIFICANT CHANGE UP
-  LINEZOLID: SIGNIFICANT CHANGE UP
-  NITROFURANTOIN: SIGNIFICANT CHANGE UP
-  NITROFURANTOIN: SIGNIFICANT CHANGE UP
-  OXACILLIN: SIGNIFICANT CHANGE UP
-  PENICILLIN: SIGNIFICANT CHANGE UP
-  RIFAMPIN: SIGNIFICANT CHANGE UP
-  TETRACYCLINE: SIGNIFICANT CHANGE UP
-  TETRACYCLINE: SIGNIFICANT CHANGE UP
-  TRIMETHOPRIM/SULFAMETHOXAZOLE: SIGNIFICANT CHANGE UP
-  VANCOMYCIN: SIGNIFICANT CHANGE UP
-  VANCOMYCIN: SIGNIFICANT CHANGE UP
ANION GAP SERPL CALC-SCNC: 11 MMOL/L — SIGNIFICANT CHANGE UP (ref 5–17)
BUN SERPL-MCNC: 15 MG/DL — SIGNIFICANT CHANGE UP (ref 8–20)
CALCIUM SERPL-MCNC: 10.8 MG/DL — HIGH (ref 8.6–10.2)
CHLORIDE SERPL-SCNC: 98 MMOL/L — SIGNIFICANT CHANGE UP (ref 98–107)
CO2 SERPL-SCNC: 27 MMOL/L — SIGNIFICANT CHANGE UP (ref 22–29)
CREAT SERPL-MCNC: 0.67 MG/DL — SIGNIFICANT CHANGE UP (ref 0.5–1.3)
CULTURE RESULTS: SIGNIFICANT CHANGE UP
FERRITIN SERPL-MCNC: 477 NG/ML — HIGH (ref 11–306)
GLUCOSE SERPL-MCNC: 94 MG/DL — SIGNIFICANT CHANGE UP (ref 70–115)
HCT VFR BLD CALC: 26.1 % — LOW (ref 37–47)
HGB BLD-MCNC: 8.4 G/DL — LOW (ref 12–16)
IRON SATN MFR SERPL: 13 UG/DL — LOW (ref 37–145)
IRON SATN MFR SERPL: 13 UG/DL — LOW (ref 37–145)
IRON SATN MFR SERPL: 7 % — LOW (ref 14–50)
LDH SERPL L TO P-CCNC: 145 U/L — SIGNIFICANT CHANGE UP (ref 98–192)
MCHC RBC-ENTMCNC: 30 PG — SIGNIFICANT CHANGE UP (ref 27–31)
MCHC RBC-ENTMCNC: 32.2 G/DL — SIGNIFICANT CHANGE UP (ref 32–36)
MCV RBC AUTO: 93.2 FL — SIGNIFICANT CHANGE UP (ref 81–99)
METHOD TYPE: SIGNIFICANT CHANGE UP
METHOD TYPE: SIGNIFICANT CHANGE UP
ORGANISM # SPEC MICROSCOPIC CNT: SIGNIFICANT CHANGE UP
PLATELET # BLD AUTO: 384 K/UL — SIGNIFICANT CHANGE UP (ref 150–400)
POTASSIUM SERPL-MCNC: 4.8 MMOL/L — SIGNIFICANT CHANGE UP (ref 3.5–5.3)
POTASSIUM SERPL-SCNC: 4.8 MMOL/L — SIGNIFICANT CHANGE UP (ref 3.5–5.3)
RBC # BLD: 2.55 M/UL — LOW (ref 4.4–5.2)
RBC # BLD: 2.8 M/UL — LOW (ref 4.4–5.2)
RBC # FLD: 14.1 % — SIGNIFICANT CHANGE UP (ref 11–15.6)
RETICS #: 27.5 K/UL — SIGNIFICANT CHANGE UP (ref 25–125)
RETICS/RBC NFR: 1.1 % — SIGNIFICANT CHANGE UP (ref 0.5–2.6)
SODIUM SERPL-SCNC: 136 MMOL/L — SIGNIFICANT CHANGE UP (ref 135–145)
SPECIMEN SOURCE: SIGNIFICANT CHANGE UP
TIBC SERPL-MCNC: 180 UG/DL — LOW (ref 220–430)
TRANSFERRIN SERPL-MCNC: 126 MG/DL — LOW (ref 192–382)
WBC # BLD: 5.39 K/UL — SIGNIFICANT CHANGE UP (ref 4.8–10.8)
WBC # FLD AUTO: 5.39 K/UL — SIGNIFICANT CHANGE UP (ref 4.8–10.8)

## 2017-02-10 PROCEDURE — 99233 SBSQ HOSP IP/OBS HIGH 50: CPT

## 2017-02-10 RX ORDER — SACCHAROMYCES BOULARDII 250 MG
1 POWDER IN PACKET (EA) ORAL
Qty: 0 | Refills: 0 | COMMUNITY
Start: 2017-02-10

## 2017-02-10 RX ORDER — SACCHAROMYCES BOULARDII 250 MG
1 POWDER IN PACKET (EA) ORAL
Qty: 30 | Refills: 0 | OUTPATIENT
Start: 2017-02-10 | End: 2017-02-25

## 2017-02-10 RX ORDER — SACCHAROMYCES BOULARDII 250 MG
250 POWDER IN PACKET (EA) ORAL
Qty: 0 | Refills: 0 | Status: DISCONTINUED | OUTPATIENT
Start: 2017-02-10 | End: 2017-02-15

## 2017-02-10 RX ORDER — ALPRAZOLAM 0.25 MG
1 TABLET ORAL
Qty: 0 | Refills: 0 | DISCHARGE
Start: 2017-02-10

## 2017-02-10 RX ORDER — PANTOPRAZOLE SODIUM 20 MG/1
1 TABLET, DELAYED RELEASE ORAL
Qty: 0 | Refills: 0 | COMMUNITY
Start: 2017-02-10

## 2017-02-10 RX ORDER — VANCOMYCIN HCL 1 G
2.5 VIAL (EA) INTRAVENOUS
Qty: 100 | Refills: 0 | OUTPATIENT
Start: 2017-02-10 | End: 2017-02-20

## 2017-02-10 RX ADMIN — SIMVASTATIN 20 MILLIGRAM(S): 20 TABLET, FILM COATED ORAL at 21:36

## 2017-02-10 RX ADMIN — Medication 650 MILLIGRAM(S): at 00:23

## 2017-02-10 RX ADMIN — Medication 125 MILLIGRAM(S): at 11:55

## 2017-02-10 RX ADMIN — Medication 125 MILLIGRAM(S): at 18:15

## 2017-02-10 RX ADMIN — Medication 650 MILLIGRAM(S): at 13:15

## 2017-02-10 RX ADMIN — Medication 50 MILLIGRAM(S): at 18:15

## 2017-02-10 RX ADMIN — ATENOLOL 25 MILLIGRAM(S): 25 TABLET ORAL at 05:49

## 2017-02-10 RX ADMIN — Medication 1000 UNIT(S): at 11:55

## 2017-02-10 RX ADMIN — Medication 650 MILLIGRAM(S): at 21:36

## 2017-02-10 RX ADMIN — Medication 125 MILLIGRAM(S): at 00:22

## 2017-02-10 RX ADMIN — Medication 125 MILLIGRAM(S): at 05:49

## 2017-02-10 RX ADMIN — PANTOPRAZOLE SODIUM 40 MILLIGRAM(S): 20 TABLET, DELAYED RELEASE ORAL at 05:49

## 2017-02-10 RX ADMIN — Medication 50 MILLIGRAM(S): at 05:49

## 2017-02-10 RX ADMIN — Medication 125 MILLIGRAM(S): at 23:34

## 2017-02-10 RX ADMIN — Medication 250 MILLIGRAM(S): at 18:15

## 2017-02-10 NOTE — DISCHARGE NOTE ADULT - CARE PLAN
Principal Discharge DX:	Clostridium difficile colitis  Instructions for follow-up, activity and diet:	It is important to see your primary physician as well as the physicians noted below within the next week to perform a comprehensive medical review.  Call their offices for an appointment as soon as you leave the hospital.  If you do not have a primary physician, contact the [facility] at [place] [phone] located on [address] [place] [address], [place] [address].  Your medical issues appear to be stable at this time, but if your symptoms recur or worsen, contact your physicians and/or return to the hospital if necessary.  If you encounter any issues or questions with your medication, call your physicians before stopping the medication.  Do not drive.  Limit your diet to 2 grams of sodium daily.  Secondary Diagnosis:	Gastrointestinal hemorrhage, unspecified gastrointestinal hemorrhage type  Secondary Diagnosis:	Fever due to unspecified condition  Secondary Diagnosis:	Chronic atrial fibrillation  Secondary Diagnosis:	Anemia, unspecified type  Secondary Diagnosis:	Acquired hypothyroidism  Secondary Diagnosis:	Gastroesophageal reflux disease, esophagitis presence not specified Principal Discharge DX:	Clostridium difficile colitis  Goal:	Stable for discharge  Instructions for follow-up, activity and diet:	It is important to see your primary physician as well as the physicians noted below within the next week to perform a comprehensive medical review.  Call their offices for an appointment as soon as you leave the hospital.  If you do not have a primary physician, contact the [facility] at [place] [phone] located on [address] [place] [address], [place] [address].  Your medical issues appear to be stable at this time, but if your symptoms recur or worsen, contact your physicians and/or return to the hospital if necessary.  If you encounter any issues or questions with your medication, call your physicians before stopping the medication.  Do not drive.  Limit your diet to 2 grams of sodium daily.  Secondary Diagnosis:	Gastrointestinal hemorrhage, unspecified gastrointestinal hemorrhage type  Secondary Diagnosis:	Fever due to unspecified condition  Secondary Diagnosis:	Chronic atrial fibrillation  Secondary Diagnosis:	Anemia, unspecified type  Secondary Diagnosis:	Acquired hypothyroidism  Secondary Diagnosis:	Gastroesophageal reflux disease, esophagitis presence not specified Principal Discharge DX:	Clostridium difficile colitis  Goal:	Stable for discharge  Instructions for follow-up, activity and diet:	It is important to see your primary physician as well as the physicians noted below within the next week to perform a comprehensive medical review.  Call their offices for an appointment as soon as you leave the hospital.  If you do not have a primary physician, contact the [facility] at [place] [phone] located on [address] [place] [address], [place] [address].  Your medical issues appear to be stable at this time, but if your symptoms recur or worsen, contact your physicians and/or return to the hospital if necessary.  If you encounter any issues or questions with your medication, call your physicians before stopping the medication.  Do not drive.  Limit your diet to 2 grams of sodium daily.  Secondary Diagnosis:	Gastrointestinal hemorrhage, unspecified gastrointestinal hemorrhage type  Secondary Diagnosis:	Fever due to unspecified condition  Secondary Diagnosis:	Chronic atrial fibrillation  Secondary Diagnosis:	Anemia, unspecified type  Secondary Diagnosis:	Acquired hypothyroidism  Secondary Diagnosis:	Gastroesophageal reflux disease, esophagitis presence not specified Principal Discharge DX:	Clostridium difficile colitis  Instructions for follow-up, activity and diet:	It is important to see your primary physician as well as the physicians noted below within the next week to perform a comprehensive medical review.  Call their offices for an appointment as soon as you leave the hospital.  If you do not have a primary physician, contact the [facility] at [place] [phone] located on [address] [place] [address], [place] [address].  Your medical issues appear to be stable at this time, but if your symptoms recur or worsen, contact your physicians and/or return to the hospital if necessary.  If you encounter any issues or questions with your medication, call your physicians before stopping the medication.  Do not drive.  Limit your diet to 2 grams of sodium daily.  Secondary Diagnosis:	Gastrointestinal hemorrhage, unspecified gastrointestinal hemorrhage type  Secondary Diagnosis:	Fever due to unspecified condition  Secondary Diagnosis:	Chronic atrial fibrillation  Secondary Diagnosis:	Anemia, unspecified type  Secondary Diagnosis:	Acquired hypothyroidism  Secondary Diagnosis:	Gastroesophageal reflux disease, esophagitis presence not specified Principal Discharge DX:	Clostridium difficile colitis  Goal:	Stable bowel movements  Instructions for follow-up, activity and diet:	It is important to see your primary physician as well as the physicians noted below within the next week to perform a comprehensive medical review.  Call their offices for an appointment as soon as you leave the hospital.  If you do not have a primary physician, contact the [facility] at [place] [phone] located on [address] [place] [address], [place] [address].  Your medical issues appear to be stable at this time, but if your symptoms recur or worsen, contact your physicians and/or return to the hospital if necessary.  If you encounter any issues or questions with your medication, call your physicians before stopping the medication.  Do not drive.  Limit your diet to 2 grams of sodium daily.  Secondary Diagnosis:	Gastrointestinal hemorrhage, unspecified gastrointestinal hemorrhage type  Instructions for follow-up, activity and diet:	Follow up with Gastroenterology  Secondary Diagnosis:	Fever due to unspecified condition  Instructions for follow-up, activity and diet:	No obvious infectious etiology. Follow up with Primary Care and ID as necessary.  Secondary Diagnosis:	Chronic atrial fibrillation  Instructions for follow-up, activity and diet:	Follow up with Cardiology  Secondary Diagnosis:	Anemia, unspecified type  Instructions for follow-up, activity and diet:	Await results of bone marrow biopsy.  Secondary Diagnosis:	Acquired hypothyroidism  Instructions for follow-up, activity and diet:	Follow thyroid function  Secondary Diagnosis:	Gastroesophageal reflux disease, esophagitis presence not specified  Instructions for follow-up, activity and diet:	Continue protonix Principal Discharge DX:	Clostridium difficile colitis  Goal:	Stable bowel movements  Instructions for follow-up, activity and diet:	It is important to see your primary physician as well as the physicians noted below within the next week to perform a comprehensive medical review.  Call their offices for an appointment as soon as you leave the hospital.  If you do not have a primary physician, contact the [facility] at [place] [phone] located on [address] [place] [address], [place] [address].  Your medical issues appear to be stable at this time, but if your symptoms recur or worsen, contact your physicians and/or return to the hospital if necessary.  If you encounter any issues or questions with your medication, call your physicians before stopping the medication.  Do not drive.  Limit your diet to 2 grams of sodium daily.  Secondary Diagnosis:	Gastrointestinal hemorrhage, unspecified gastrointestinal hemorrhage type  Instructions for follow-up, activity and diet:	Follow up with Gastroenterology  Secondary Diagnosis:	Fever due to unspecified condition  Instructions for follow-up, activity and diet:	No obvious infectious etiology. Follow up with Primary Care and ID as necessary.  Secondary Diagnosis:	Chronic atrial fibrillation  Instructions for follow-up, activity and diet:	Follow up with Cardiology  Secondary Diagnosis:	Anemia, unspecified type  Instructions for follow-up, activity and diet:	Await results of bone marrow biopsy.  Secondary Diagnosis:	Acquired hypothyroidism  Instructions for follow-up, activity and diet:	Follow thyroid function  Secondary Diagnosis:	Gastroesophageal reflux disease, esophagitis presence not specified  Instructions for follow-up, activity and diet:	Continue protonix Principal Discharge DX:	Clostridium difficile colitis  Goal:	Stable bowel movements  Instructions for follow-up, activity and diet:	It is important to see your primary physician as well as the physicians noted below within the next week to perform a comprehensive medical review.  Call their offices for an appointment as soon as you leave the hospital.  If you do not have a primary physician, contact the [facility] at [place] [phone] located on [address] [place] [address], [place] [address].  Your medical issues appear to be stable at this time, but if your symptoms recur or worsen, contact your physicians and/or return to the hospital if necessary.  If you encounter any issues or questions with your medication, call your physicians before stopping the medication.  Do not drive.  Limit your diet to 2 grams of sodium daily.  Secondary Diagnosis:	Gastrointestinal hemorrhage, unspecified gastrointestinal hemorrhage type  Instructions for follow-up, activity and diet:	Follow up with Gastroenterology  Secondary Diagnosis:	Fever due to unspecified condition  Instructions for follow-up, activity and diet:	No obvious infectious etiology. Follow up with Primary Care and ID as necessary.  Secondary Diagnosis:	Chronic atrial fibrillation  Instructions for follow-up, activity and diet:	Follow up with Cardiology  Secondary Diagnosis:	Anemia, unspecified type  Instructions for follow-up, activity and diet:	Await results of bone marrow biopsy.  Secondary Diagnosis:	Acquired hypothyroidism  Instructions for follow-up, activity and diet:	Follow thyroid function  Secondary Diagnosis:	Gastroesophageal reflux disease, esophagitis presence not specified  Instructions for follow-up, activity and diet:	Continue protonix

## 2017-02-10 NOTE — PROGRESS NOTE ADULT - SUBJECTIVE AND OBJECTIVE BOX
[facility] INFECTIOUS DISEASES AND INTERNAL MEDICINE OF [place]  =======================================================  Joe Shelton MD [facility]   Mak Goss MD  Diplomates American Board of Internal Medicine and Infectious Diseases  =======================================================    MRN-[id number]  BRANNON GRANT is a 76y  Female        chart reviewed. Patient seen and examined.   Cultures remain negative. On PO vanco per GI service for C diff.   Ova and parasite negative.  stool culture in process  CEA is 1.9 (low)         =======================================================  Allergies    adhesives (Rash)  Ceclor (Rash)  cephalosporins (Anaphylaxis)  codeine (Hives)  Demerol HCl (Hives)  penicillin (Short breath)  sulfa drugs (Hives)  Sulfac 10% (Anaphylaxis; Short breath)    Intolerances      ======================  MEDICATIONS  (STANDING):  flecainide 50milliGRAM(s) Oral every 12 hours  simvastatin 20milliGRAM(s) Oral at bedtime  ATENolol  Tablet 25milliGRAM(s) Oral daily  cholecalciferol 1000Unit(s) Oral daily  pantoprazole    Tablet 40milliGRAM(s) Oral before breakfast  vancomycin    Solution 125milliGRAM(s) Oral every 6 hours  saccharomyces boulardii 250milliGRAM(s) Oral two times a day    MEDICATIONS  (PRN):  ALPRAZolam 0.25milliGRAM(s) Oral three times a day PRN anxiety  acetaminophen   Tablet 650milliGRAM(s) Oral every 6 hours PRN For Temp greater than 38 C (100.4 F)       =======================================================  REVIEW OF SYSTEMS:  CONSTITUTIONAL:  as per HPI  HEENT:  Eyes:  No diplopia or blurred vision. ENT:  No earache, sore throat or runny nose.  CARDIOVASCULAR:  No pressure, squeezing, strangling, tightness, heaviness or aching about the chest, neck, axilla or epigastrium.  RESPIRATORY:  No cough, shortness of breath, PND or orthopnea.  GASTROINTESTINAL:   AS ABOVE  GENITOURINARY:  No dysuria, frequency or urgency. No Blood in urine  MUSCULOSKELETAL:  no joint aches, no muscle pain  SKIN:  No change in skin, hair or nails.  NEUROLOGIC:  No paresthesias, fasciculations, seizures or weakness.  PSYCHIATRIC:  No disorder of thought or mood.  ENDOCRINE:  No heat or cold intolerance, polyuria or polydipsia.  HEMATOLOGICAL:  No easy bruising or bleeding.     =======================================================  Physical Exam:  ============  Vital Signs Last 24 Hrs  T(C): 37.1, Max: 38.5 (02-10 @ 00:23)  T(F): 98.7, Max: 101.3 (02-10 @ 00:23)  HR: 72 (69 - 77)  BP: 136/68 (110/50 - 136/68)  BP(mean): --  RR: 16 (16 - 16)  SpO2: 99% (96% - 99%)    GEN: NAD, pleasant, thin, no distress  HEENT: normocephalic and atraumatic. EOMI. STEPHANIE.    NECK: Supple. No carotid bruits.  slightly prominent submandibular LN  LUNGS: fair air entry, upper lung zone with scatter rhonchi  HEART: Regular rate and rhythm without murmur.  ABDOMEN: Soft, nontender, and nondistended.  Positive bowel sounds.  flat soft  rectal exam deferred - patient had several here with positive occult blood and no rectal tenderness  EXTREMITIES: Without any cyanosis, clubbing, rash, lesions or edema.  NEUROLOGIC: Cranial nerves II through XII are grossly intact.  PSYCHIATRIC: Appropriate affect .  SKIN: No ulceration or induration present.    =======================================================  Labs:  ====  10 Feb 2017 09:40    136    |  98     |  15.0   ----------------------------<  94     4.8     |  27.0   |  0.67     Ca    10.8       10 Feb 2017 09:40                            8.4    5.39  )-----------( 384      ( 10 Feb 2017 09:40 )             26.1         CAPILLARY BLOOD GLUCOSE       RECENT CULTURES:  02-09 .Stool feces XXXX XXXX   Testing in progress    02-08 .Stool Feces XXXX XXXX   No enteric pathogens to date: Final culture pending    02-07 .Blood Blood-Peripheral XXXX XXXX   No growth at 48 hours    02-07 .Urine Clean Catch (Midstream) XXXX XXXX   50,000 CFU/ml Staphylococcus species Identification and susceptibility to  follow.  25,000 CFU/ml Streptococcus species Identification and susceptibility to  follow.  Culture in progress    02-05 .Urine Clean Catch (Midstream) XXXX XXXX   Culture grew 3 or more types of organisms which indicate  collection contamination; consider recollection only if clinically  indicated.  notify IVETTE Ramsey    02-05 .Blood Blood XXXX XXXX   No growth at 48 hours          ================     EXAM:  CT ABDOMEN AND PELVIS OC                          PROCEDURE DATE:  02/08/2017        INTERPRETATION:  Clinical information: Fever, anemia, history of   diverticulosis    Comparison: Virtual colonoscopy 11/10    PROCEDURE:     CT of the Abdomen and Pelvis was performed without intravenous contrast.    Evaluation of abdominal and pelvic viscera, lymph nodes and vasculature   is limited without intravenous contrast.    FINDINGS:    LOWER CHEST: Chronic right pleural effusion and postsurgical changes   right lung base. 5 mm left lower lobe nodule, similar to 2012.    ABDOMEN:  LIVER: Multiple cysts and hypodensities small to characterize  BILE DUCTS: normal caliber  GALLBLADDER: no calcified gallstones. normal caliber wall  PANCREAS: within normal limits  SPLEEN: within normal limits  ADRENALS: within normal limits  KIDNEYS: within normal limits    PELVIS:  REPRODUCTIVE ORGANS: no pelvic masses, hysterectomy  BLADDER: within normal limits    BOWEL: Extensive sigmoid diverticulosis with chronic muscular   hypertrophy, similar to prior. No inflammatory changes or evidence of   bowel obstruction. Question mild thickening of the cecum. The appendix is   not visualized.  PERITONEUM: no ascites or free air, no fluid collection  RETROPERITONEUM: no enlarged retroperitoneal or pelvic nodes.    VESSELS: Multifocal vascular calcifications.  ABDOMINAL WALL: within normal limits.  MUSCULOSKELETAL: Right hip adductor intramuscular lipoma.    IMPRESSION:     No acute findings. Sigmoid diverticulosis with chronic muscular   hypertrophy without acute associated findings. Question mild thickening   of the cecum which may be inflammatory or infectious.        =============     EXAM:  CT COLONOGRAPHY DIAGNOSTIC                          PROCEDURE DATE:  11/10/2016        INTERPRETATION:  History:Failed direct colonoscopy. Obstruction   encountered at the level of the sigmoid colon. Patient with GI bleeding..    Date and time of exam: 11/10/2016 1:30 PM.    Comparison: No prior exam.    Technique: The patient underwent routine bowel cleansing preparation   prior to the exam.    The colon was insufflated with air and the patient   was scanned in supine and prone positions.  2D and 3D images were then   reviewed.    Findings: Pulmonary survey of the abdomen and pelvis demonstrates pleural   thickening at the right lung base with loss of volume in the right   hemithorax and shift of the mediastinum to the right. These findings are   unchanged compared with a prior CT scan of the chest dated 10/12/2012.   There are several cysts in the liver, unchanged.    Bilateral renal cortical cysts. No evidence of hydronephrosis.    Atherosclerotic changes in the abdominal aorta.    There is extensive diverticulosis of the sigmoid colon. Less pronounced   diverticulosis is noted in the descending colon, transverse colon and   ascending colon. The colon is visualized from the anus to the cecum. The   ileocecal valve is identified. The appendix is not visualized. There is   marked circumferential thickening of the wall of the mid sigmoid colon   for a length of approximately 6 cm. This portion of the colon does not   distend on either supine or prone imaging.    There is a 7 mm polyp arising from the posterior wall of the mid   descending colon. No other evidence of polyp.    Impression:  Extensive colonic diverticulosis most pronounced in the sigmoid colon. A   6 cm length of mid sigmoid colon does not distend on supine and prone   images and cannot be further evaluated. A neoplasm at this site cannot be   excluded. This is presumably the site of obstruction on direct   colonoscopy.    7 mm polyp arising from the posterior wall of the mid descending colon.. NPP INFECTIOUS DISEASES AND INTERNAL MEDICINE OF [place]  =======================================================  Joe Shelton MD [facility]   Mak Goss MD  Diplomates American Board of Internal Medicine and Infectious Diseases  =======================================================    MRN-[id number]  BRANNON GRANT is a 76y  Female        chart reviewed. Patient seen and examined.   Cultures remain negative. On PO vanco per GI service for C diff.   Ova and parasite negative.  stool culture in process  CEA is 1.9 (low)    Still with intermittent fevers. Tmax 101.3   still reports some feculent vaginal discharge    =======================================================  Allergies    adhesives (Rash)  Ceclor (Rash)  cephalosporins (Anaphylaxis)  codeine (Hives)  Demerol HCl (Hives)  penicillin (Short breath)  sulfa drugs (Hives)  Sulfac 10% (Anaphylaxis; Short breath)    Intolerances      ======================  MEDICATIONS  (STANDING):  flecainide 50milliGRAM(s) Oral every 12 hours  simvastatin 20milliGRAM(s) Oral at bedtime  ATENolol  Tablet 25milliGRAM(s) Oral daily  cholecalciferol 1000Unit(s) Oral daily  pantoprazole    Tablet 40milliGRAM(s) Oral before breakfast  vancomycin    Solution 125milliGRAM(s) Oral every 6 hours  saccharomyces boulardii 250milliGRAM(s) Oral two times a day    MEDICATIONS  (PRN):  ALPRAZolam 0.25milliGRAM(s) Oral three times a day PRN anxiety  acetaminophen   Tablet 650milliGRAM(s) Oral every 6 hours PRN For Temp greater than 38 C (100.4 F)       =======================================================  REVIEW OF SYSTEMS:  CONSTITUTIONAL:  as per HPI  HEENT:  Eyes:  No diplopia or blurred vision. ENT:  No earache, sore throat or runny nose.  CARDIOVASCULAR:  No pressure, squeezing, strangling, tightness, heaviness or aching about the chest, neck, axilla or epigastrium.  RESPIRATORY:  No cough, shortness of breath, PND or orthopnea.  GASTROINTESTINAL:   AS ABOVE  GENITOURINARY:  No dysuria, frequency or urgency. No Blood in urine  VAGINAL discharge  MUSCULOSKELETAL:  no joint aches, no muscle pain  SKIN:  No change in skin, hair or nails.  NEUROLOGIC:  No paresthesias, fasciculations, seizures or weakness.  PSYCHIATRIC:  No disorder of thought or mood.  ENDOCRINE:  No heat or cold intolerance, polyuria or polydipsia.  HEMATOLOGICAL:  No easy bruising or bleeding.     =======================================================  Physical Exam:  ============  Vital Signs Last 24 Hrs  T(C): 37.1, Max: 38.5 (02-10 @ 00:23)  T(F): 98.7, Max: 101.3 (02-10 @ 00:23)  HR: 72 (69 - 77)  BP: 136/68 (110/50 - 136/68)  BP(mean): --  RR: 16 (16 - 16)  SpO2: 99% (96% - 99%)    GEN: NAD, pleasant, thin, no distress  HEENT: normocephalic and atraumatic. EOMI. STEPHANIE.    NECK: Supple. No carotid bruits.  slightly prominent submandibular LN  LUNGS: fair air entry, upper lung zone with scatter rhonchi  HEART: Regular rate and rhythm without murmur.  ABDOMEN: Soft, nontender, and nondistended.  Positive bowel sounds.  flat soft  rectal exam deferred - patient had several here with positive occult blood and no rectal tenderness  EXTREMITIES: Without any cyanosis, clubbing, rash, lesions or edema.  NEUROLOGIC: Cranial nerves II through XII are grossly intact.  PSYCHIATRIC: Appropriate affect .  SKIN: No ulceration or induration present.    =======================================================  Labs:  ====  10 Feb 2017 09:40    136    |  98     |  15.0   ----------------------------<  94     4.8     |  27.0   |  0.67     Ca    10.8       10 Feb 2017 09:40                            8.4    5.39  )-----------( 384      ( 10 Feb 2017 09:40 )             26.1         CAPILLARY BLOOD GLUCOSE       RECENT CULTURES:  02-09 .Stool feces XXXX XXXX   Testing in progress    02-08 .Stool Feces XXXX XXXX   No enteric pathogens to date: Final culture pending    02-07 .Blood Blood-Peripheral XXXX XXXX   No growth at 48 hours    02-07 .Urine Clean Catch (Midstream) XXXX XXXX   50,000 CFU/ml Staphylococcus species Identification and susceptibility to  follow.  25,000 CFU/ml Streptococcus species Identification and susceptibility to  follow.  Culture in progress    02-05 .Urine Clean Catch (Midstream) XXXX XXXX   Culture grew 3 or more types of organisms which indicate  collection contamination; consider recollection only if clinically  indicated.  notify IVETTE Ramsey    02-05 .Blood Blood XXXX XXXX   No growth at 48 hours          ================     EXAM:  CT ABDOMEN AND PELVIS OC                          PROCEDURE DATE:  02/08/2017        INTERPRETATION:  Clinical information: Fever, anemia, history of   diverticulosis    Comparison: Virtual colonoscopy 11/10    PROCEDURE:     CT of the Abdomen and Pelvis was performed without intravenous contrast.    Evaluation of abdominal and pelvic viscera, lymph nodes and vasculature   is limited without intravenous contrast.    FINDINGS:    LOWER CHEST: Chronic right pleural effusion and postsurgical changes   right lung base. 5 mm left lower lobe nodule, similar to 2012.    ABDOMEN:  LIVER: Multiple cysts and hypodensities small to characterize  BILE DUCTS: normal caliber  GALLBLADDER: no calcified gallstones. normal caliber wall  PANCREAS: within normal limits  SPLEEN: within normal limits  ADRENALS: within normal limits  KIDNEYS: within normal limits    PELVIS:  REPRODUCTIVE ORGANS: no pelvic masses, hysterectomy  BLADDER: within normal limits    BOWEL: Extensive sigmoid diverticulosis with chronic muscular   hypertrophy, similar to prior. No inflammatory changes or evidence of   bowel obstruction. Question mild thickening of the cecum. The appendix is   not visualized.  PERITONEUM: no ascites or free air, no fluid collection  RETROPERITONEUM: no enlarged retroperitoneal or pelvic nodes.    VESSELS: Multifocal vascular calcifications.  ABDOMINAL WALL: within normal limits.  MUSCULOSKELETAL: Right hip adductor intramuscular lipoma.    IMPRESSION:     No acute findings. Sigmoid diverticulosis with chronic muscular   hypertrophy without acute associated findings. Question mild thickening   of the cecum which may be inflammatory or infectious.        =============     EXAM:  CT COLONOGRAPHY DIAGNOSTIC                          PROCEDURE DATE:  11/10/2016        INTERPRETATION:  History:Failed direct colonoscopy. Obstruction   encountered at the level of the sigmoid colon. Patient with GI bleeding..    Date and time of exam: 11/10/2016 1:30 PM.    Comparison: No prior exam.    Technique: The patient underwent routine bowel cleansing preparation   prior to the exam.    The colon was insufflated with air and the patient   was scanned in supine and prone positions.  2D and 3D images were then   reviewed.    Findings: Pulmonary survey of the abdomen and pelvis demonstrates pleural   thickening at the right lung base with loss of volume in the right   hemithorax and shift of the mediastinum to the right. These findings are   unchanged compared with a prior CT scan of the chest dated 10/12/2012.   There are several cysts in the liver, unchanged.    Bilateral renal cortical cysts. No evidence of hydronephrosis.    Atherosclerotic changes in the abdominal aorta.    There is extensive diverticulosis of the sigmoid colon. Less pronounced   diverticulosis is noted in the descending colon, transverse colon and   ascending colon. The colon is visualized from the anus to the cecum. The   ileocecal valve is identified. The appendix is not visualized. There is   marked circumferential thickening of the wall of the mid sigmoid colon   for a length of approximately 6 cm. This portion of the colon does not   distend on either supine or prone imaging.    There is a 7 mm polyp arising from the posterior wall of the mid   descending colon. No other evidence of polyp.    Impression:  Extensive colonic diverticulosis most pronounced in the sigmoid colon. A   6 cm length of mid sigmoid colon does not distend on supine and prone   images and cannot be further evaluated. A neoplasm at this site cannot be   excluded. This is presumably the site of obstruction on direct   colonoscopy.    7 mm polyp arising from the posterior wall of the mid descending colon..

## 2017-02-10 NOTE — PROGRESS NOTE ADULT - SUBJECTIVE AND OBJECTIVE BOX
C diff positive,extensive diverticular disease,OR Was deferred by patient due to personal cicumstances earlier in the month,Plan OR When C Diff Resolved

## 2017-02-10 NOTE — DISCHARGE NOTE ADULT - MEDICATION SUMMARY - MEDICATIONS TO STOP TAKING
I will STOP taking the medications listed below when I get home from the hospital:    aspirin 325 mg oral tablet  -- 1 tab(s) by mouth once a day

## 2017-02-10 NOTE — PROGRESS NOTE ADULT - PROBLEM SELECTOR PLAN 1
Will treat with 2 weeks of po vanco.  I spoke to Dr Mckenzie - eventual outpatient left hemicolectomy when  C diff resolved. ( possible mass vs diverticular stricture).  May D/C home from Gi standpoint. F/U with Dr Hernandes in 3 weeks.      Hematology evaluation called. ID following for low grade fevers

## 2017-02-10 NOTE — DISCHARGE NOTE ADULT - MEDICATION SUMMARY - MEDICATIONS TO TAKE
I will START or STAY ON the medications listed below when I get home from the hospital:    flecainide 50 mg oral tablet  -- 1 tab(s) by mouth every 12 hours  -- Indication: For Atrial fibrillation    Zocor 20 mg oral tablet  -- 1 tab(s) by mouth once a day (at bedtime)  -- Indication: For Hyperlipidemia    ALPRAZolam 0.25 mg oral tablet  -- 1 tab(s) by mouth 3 times a day, As needed, anxiety  -- Indication: For Anxiety    atenolol 25 mg oral tablet  -- 1 tab(s) by mouth once a day  -- Indication: For Atrial fibrillation    vancomycin 250 mg/5 mL oral solution  -- 2.5 milliliter(s) by mouth every 6 hours  -- Indication: For Clostridium difficile colitis    saccharomyces boulardii lyo 250 mg oral capsule  -- 1 cap(s) by mouth 2 times a day  -- Indication: For Clostridium difficile colitis    pantoprazole 40 mg oral delayed release tablet  -- 1 tab(s) by mouth once a day (before a meal)  -- Indication: For Gastrointestinal hemorrhage    Vitamin D3 2000 intl units oral capsule  -- 1 cap(s) by mouth once a day  -- Indication: For Supplement I will START or STAY ON the medications listed below when I get home from the hospital:    flecainide 50 mg oral tablet  -- 1 tab(s) by mouth every 12 hours  -- Indication: For Atrial fibrillation    simvastatin 20 mg oral tablet  -- 1 tab(s) by mouth once a day (at bedtime)  -- Indication: For Hyperlipidemia    ALPRAZolam 0.25 mg oral tablet  -- 1 tab(s) by mouth 3 times a day, As needed, anxiety  -- Indication: For Anxiety    atenolol 25 mg oral tablet  -- 1 tab(s) by mouth once a day  -- Indication: For HTN    fluticasone 50 mcg/inh nasal spray  -- 1 spray(s) into nose 2 times a day  -- Indication: For Allergic Rhinitis    pantoprazole 40 mg oral delayed release tablet  -- 1 tab(s) by mouth once a day (before a meal)  -- Indication: For Gastrointestinal hemorrhage    cholecalciferol oral tablet  -- 1000 unit(s) by mouth once a day  -- Indication: For Vitamin D Deficiency

## 2017-02-10 NOTE — CONSULT NOTE ADULT - SUBJECTIVE AND OBJECTIVE BOX
[facility]  MD Mayelin Weathers MD  [phone]  Answering Amanda : [phone]        BRANNON [id number]      HPI:  76 years old female with PMH of HTN, A.Fib, dyslipidemia, Anxiety and abnormal colonoscopy on 11/2016 presented to the ER for the evaluation of continued bloody stools associated with fatigue and generalized weakness. She was seen in the ER 2 days ago for anemia and received PRBC. She was sent home to follow up with her PCP. Patient reports 1 month of fever for which she was seen by . He ordered blood work but didn't put her on any antibiotic. Patient also reports poor appetites, loss of weight and constipation. Patient also has a h/o colovaginal fistula for which she was seen by   but no surgical intervention was offered (per patient). Denies any abdominal pain, nausea, vomiting, SOB, dizziness or lightheadedness. (07 Feb 2017 16:29). She is reffered for an incidental finding of a positive SPEP which appears c/w MGUS at this point. She remains intermittently febrile and remains very fatigued.      PAST MEDICAL & SURGICAL HISTORY:  Breast cancer: right lumpectomy RT/CT  Lung cancer: right CT/RT  GERD (gastroesophageal reflux disease)  Sinus problem  Hypothyroid  Hyperparathyroidism  Smoker  Hyperlipidemia  COPD (chronic obstructive pulmonary disease)  Anxiety  Palpitations  Atrial fibrillation: PAF  S/P lumpectomy, right breast: CT/RT  S/P partial lobectomy of lung: right      ANTIBIOTICS  vancomycin    Solution 125milliGRAM(s) Oral every 6 hours      Allergies    adhesives (Rash)  Ceclor (Rash)  cephalosporins (Anaphylaxis)  codeine (Hives)  Demerol HCl (Hives)  penicillin (Short breath)  sulfa drugs (Hives)  Sulfac 10% (Anaphylaxis; Short breath)    Intolerances        SOCIAL HISTORY:    FAMILY HISTORY:  No pertinent family history in first degree relatives      Vital Signs Last 24 Hrs  T(C): 37.1, Max: 38.5 (02-10 @ 00:23)  T(F): 98.7, Max: 101.3 (02-10 @ 00:23)  HR: 72 (69 - 77)  BP: 136/68 (110/50 - 136/68)  BP(mean): --  RR: 16 (16 - 16)  SpO2: 99% (96% - 99%)  Drug Dosing Weight  Height (cm): 160 (20 Sep 2016 18:49)  Weight (kg): 44.4 (10 Feb 2017 00:23)  BMI (kg/m2): 17.3 (10 Feb 2017 00:23)  BSA (m2): 1.43 (10 Feb 2017 00:23)      REVIEW OF SYSTEMS:    CONSTITUTIONAL:  As per HPI.    HEENT:  Eyes:  No diplopia or blurred vision. ENT:  No earache, sore throat or runny nose.    CARDIOVASCULAR:  No pressure, squeezing, strangling, tightness, heaviness or aching about the chest, neck, axilla or epigastrium.    RESPIRATORY:  No cough, shortness of breath, PND or orthopnea.    GASTROINTESTINAL:  No nausea, vomiting or diarrhea.    GENITOURINARY:  No dysuria, frequency or urgency.    MUSCULOSKELETAL:  As per HPI.    SKIN:  No change in skin, hair or nails.    NEUROLOGIC:  No paresthesias, fasciculations, seizures or weakness.    PSYCHIATRIC:  No disorder of thought or mood.    ENDOCRINE:  No heat or cold intolerance, polyuria or polydipsia.    HEMATOLOGICAL:  No easy bruising or bleeding.           PHYSICAL EXAMINATION:    GENERAL: The patient is an asthenic female_____in no apparent distress. ___ is alert and oriented x3.    VITAL SIGNS:     HEENT: Head is normocephalic and atraumatic. Extraocular muscles are intact. Pupils are equal, round, and reactive to light and accommodation. Nares appeared normal. Mouth is well hydrated and without lesions. Mucous membranes are moist. Posterior pharynx clear of any exudate or lesions.    NECK: Supple. No carotid bruits.  No lymphadenopathy or thyromegaly.    LUNGS: Clear to auscultation.    HEART: Regular rate and rhythm without murmur.    ABDOMEN: Soft, nontender, and nondistended.  Positive bowel sounds.  No hepatosplenomegaly was noted.    EXTREMITIES: Without any cyanosis, clubbing, rash, lesions or edema.    NEUROLOGIC: Cranial nerves II through XII are grossly intact.    PSYCHIATRIC: Flat affect, but denies suicidal or homicidal ideations.  SKIN: No ulceration or induration present.    MICROBIOLOGY:      LABS:                        8.4    5.39  )-----------( 384      ( 10 Feb 2017 09:40 )             26.1     10 Feb 2017 09:40    136    |  98     |  15.0   ----------------------------<  94     4.8     |  27.0   |  0.67     Ca    10.8       10 Feb 2017 09:40        ASSESSMENT:  FUO  Vaginal discharge  Anemia unspecified  Monoclonal gammopathy likely MGUS    PLAN:  Anemia work-up.  Suggest additional work-up with regards to persistent fecaloid vaginal discharge and its relation to the fever.  Doubt the presence of a mild gammopathy has any bearing on the current situation but will add further work-up.      Durga Avalos M.D.

## 2017-02-10 NOTE — DISCHARGE NOTE ADULT - CARE PROVIDERS DIRECT ADDRESSES
,[email or web address],DirectAddress_Unknown,DirectAddress_Unknown,DirectAddress_Unknown

## 2017-02-10 NOTE — PROGRESS NOTE ADULT - SUBJECTIVE AND OBJECTIVE BOX
BRANNON GRANT    [id number]    76y      Female    HPI On admission: 75 y/o female with hx of HTN, Afib, dyslipidemia, Anxiety, and abnormal colonoscopy 11/2016 with sigmoid thickening, colovaginal fistula, presented to ED for evaluation of continued bloody stools associated with fatigue and generalized weakness.  She had been seen in ED 2 days prior for anemia and transfused 2 units PrBC.  Pt reported intermittent fever for past 4-6 weeks, seen by Nikita.  He ordered a blood work up but did not put her on antibiotics.  Pt also reported poor appetite, loss of weight and constipation.  Pt denied any abdominal pain this morning.  C/o of chills, current temp 100.5,  Pt reports she had one questionable bloody BM on Monday and none since. She had been having regular BMs.      No complaints.  No abdominal pain.  No bleeding / diarrhea.  No additional complaints. Fever noted.       PHYSICAL EXAM:  GENERAL: NAD, frail  HEENT: PERRL, +EOMI  NECK: soft, Supple, No JVD,   CHEST/LUNG: Clear to percussion, decreased bs at base on left  HEART: S1S2+, Regular rate and rhythm; No murmurs, rubs, or gallops  ABDOMEN: Soft, Nontender, Nondistended; Bowel sounds present  EXTREMITIES:  2+ Peripheral Pulses, No clubbing, cyanosis, or edema  SKIN: No rashes or lesions  NEURO: AAOX3, no focal deficits, no motor r sensory loss  PSYCH: normal mood      MEDICATIONS:  flecainide 50milliGRAM(s) Oral every 12 hours  simvastatin 20milliGRAM(s) Oral at bedtime  ALPRAZolam 0.25milliGRAM(s) Oral three times a day PRN  ATENolol  Tablet 25milliGRAM(s) Oral daily  cholecalciferol 1000Unit(s) Oral daily  pantoprazole    Tablet 40milliGRAM(s) Oral before breakfast  acetaminophen   Tablet 650milliGRAM(s) Oral every 6 hours PRN  vancomycin    Solution 125milliGRAM(s) Oral every 6 hours  influenza   Vaccine 0.5milliLiter(s) IntraMuscular once  saccharomyces boulardii 250milliGRAM(s) Oral two times a day      VITALS:  Vital Signs Last 24 Hrs  T(C): 37.1, Max: 38.5 (02-10 @ 00:23)  T(F): 98.7, Max: 101.3 (02-10 @ 00:23)  HR: 72 (69 - 77)  BP: 136/68 (110/50 - 136/68)  BP(mean): --  RR: 16 (16 - 16)  SpO2: 99% (96% - 99%) Daily     Daily   CAPILLARY BLOOD GLUCOSE    I&O's Summary    I & Os for current day (as of 10 Feb 2017 09:30)  =============================================  IN: 110 ml / OUT: 0 ml / NET: 110 ml      LABS:                        7.7    5.58  )-----------( 393      ( 09 Feb 2017 06:48 )             23.8     09 Feb 2017 06:48    136    |  99     |  13.0   ----------------------------<  135    4.0     |  26.0   |  0.87     Ca    10.4       09 Feb 2017 06:48        RECENT CULTURES:  02-09 .Stool feces XXXX XXXX   Testing in progress    02-08 .Stool Feces XXXX XXXX   No enteric pathogens to date: Final culture pending    02-07 .Blood Blood-Peripheral XXXX XXXX   No growth at 48 hours    02-07 .Urine Clean Catch (Midstream) XXXX XXXX   50,000 CFU/ml Staphylococcus species Identification and susceptibility to follow.25,000 CFU/ml Streptococcus species Identification and susceptibility to follow.  Culture in progress    02-05 .Urine Clean Catch (Midstream) XXXX XXXX   Culture grew 3 or more types of organisms which indicate collection contamination; consider recollection only if clinically  indicated.  notify IVETTE Ramsey    02-05 .Blood Blood XXXX XXXX   No growth at 48 hours BRANNON GRANT    [id number]    76y      Female    HPI On admission: 77 y/o female with hx of HTN, Afib, dyslipidemia, Anxiety, and abnormal colonoscopy 11/2016 with sigmoid thickening, colovaginal fistula, presented to ED for evaluation of continued bloody stools associated with fatigue and generalized weakness.  She had been seen in ED 2 days prior for anemia and transfused 2 units PrBC.  Pt reported intermittent fever for past 4-6 weeks, seen by Nikita.  He ordered a blood work up but did not put her on antibiotics.  Pt also reported poor appetite, loss of weight and constipation.  Pt denied any abdominal pain this morning.  C/o of chills, current temp 100.5,  Pt reports she had one questionable bloody BM on Monday and none since. She had been having regular BMs.    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  Upon starting Vancomycin, pt's diarrhea and bleeding have resolved.  Seen by Surgery, recommended outpatient workup after treatment for Cdif.  Pt still with recurrent fevers x 6 weeks despite benign abdominal exam.   Discussed with ID, known to have positive M spike on SPEP.  Hematologic workup pending.     No complaints.  No abdominal pain.  No bleeding / diarrhea.  No additional complaints. Fever noted.       PHYSICAL EXAM:  GENERAL: NAD, frail  HEENT: PERRL, +EOMI  NECK: soft, Supple, No JVD,   CHEST/LUNG: Clear to percussion, decreased bs at base on left  HEART: S1S2+, Regular rate and rhythm; No murmurs, rubs, or gallops  ABDOMEN: Soft, Nontender, Nondistended; Bowel sounds present  EXTREMITIES:  2+ Peripheral Pulses, No clubbing, cyanosis, or edema  SKIN: No rashes or lesions  NEURO: AAOX3, no focal deficits, no motor r sensory loss  PSYCH: normal mood      MEDICATIONS:  flecainide 50milliGRAM(s) Oral every 12 hours  simvastatin 20milliGRAM(s) Oral at bedtime  ALPRAZolam 0.25milliGRAM(s) Oral three times a day PRN  ATENolol  Tablet 25milliGRAM(s) Oral daily  cholecalciferol 1000Unit(s) Oral daily  pantoprazole    Tablet 40milliGRAM(s) Oral before breakfast  acetaminophen   Tablet 650milliGRAM(s) Oral every 6 hours PRN  vancomycin    Solution 125milliGRAM(s) Oral every 6 hours  influenza   Vaccine 0.5milliLiter(s) IntraMuscular once  saccharomyces boulardii 250milliGRAM(s) Oral two times a day      VITALS:  Vital Signs Last 24 Hrs  T(C): 37.1, Max: 38.5 (02-10 @ 00:23)  T(F): 98.7, Max: 101.3 (02-10 @ 00:23)  HR: 72 (69 - 77)  BP: 136/68 (110/50 - 136/68)  BP(mean): --  RR: 16 (16 - 16)  SpO2: 99% (96% - 99%) Daily     Daily   CAPILLARY BLOOD GLUCOSE    I&O's Summary    I & Os for current day (as of 10 Feb 2017 09:30)  =============================================  IN: 110 ml / OUT: 0 ml / NET: 110 ml      LABS:                        7.7    5.58  )-----------( 393      ( 09 Feb 2017 06:48 )             23.8     09 Feb 2017 06:48    136    |  99     |  13.0   ----------------------------<  135    4.0     |  26.0   |  0.87     Ca    10.4       09 Feb 2017 06:48        RECENT CULTURES:  02-09 .Stool feces XXXX XXXX   Testing in progress    02-08 .Stool Feces XXXX XXXX   No enteric pathogens to date: Final culture pending    02-07 .Blood Blood-Peripheral XXXX XXXX   No growth at 48 hours    02-07 .Urine Clean Catch (Midstream) XXXX XXXX   50,000 CFU/ml Staphylococcus species Identification and susceptibility to follow.25,000 CFU/ml Streptococcus species Identification and susceptibility to follow.  Culture in progress    02-05 .Urine Clean Catch (Midstream) XXXX XXXX   Culture grew 3 or more types of organisms which indicate collection contamination; consider recollection only if clinically  indicated.  notify IVETTE Ramsey    02-05 .Blood Blood XXXX XXXX   No growth at 48 hours BRANNON GRANT    [id number]    76y      Female    HPI On admission: 75 y/o female with hx of HTN, Afib, dyslipidemia, Anxiety, and abnormal colonoscopy 11/2016 with sigmoid thickening, colovaginal fistula, presented to ED for evaluation of continued bloody stools associated with fatigue and generalized weakness.  She had been seen in ED 2 days prior for anemia and transfused 2 units PrBC.  Pt reported intermittent fever for past 4-6 weeks, seen by Nikita.  He ordered a blood work up but did not put her on antibiotics.  Pt also reported poor appetite, loss of weight and constipation.  Pt denied any abdominal pain this morning.  C/o of chills, current temp 100.5,  Pt reports she had one questionable bloody BM on Monday and none since. She had been having regular BMs.    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  Upon starting Vancomycin, pt's diarrhea and bleeding have resolved.  Seen by Surgery, recommended outpatient workup after treatment for Cdif.  Pt still with recurrent fevers x 6 weeks despite benign abdominal exam.   Discussed with ID, known to have positive M spike on SPEP.  Hematologic workup in progress.  Pt with persistent fevers.  Per ID recommendations, they requested GYN /  evaluation of possible colovaginal fistula.      No complaints.  No abdominal pain.  No bleeding / diarrhea.  No additional complaints. Fever noted.       PHYSICAL EXAM:  GENERAL: NAD, frail  HEENT: PERRL, +EOMI  NECK: soft, Supple, No JVD,   CHEST/LUNG: Clear to percussion, decreased bs at base on left  HEART: S1S2+, Regular rate and rhythm; No murmurs, rubs, or gallops  ABDOMEN: Soft, Nontender, Nondistended; Bowel sounds present  EXTREMITIES:  2+ Peripheral Pulses, No clubbing, cyanosis, or edema  SKIN: No rashes or lesions  NEURO: AAOX3, no focal deficits, no motor r sensory loss  PSYCH: normal mood      MEDICATIONS:  flecainide 50milliGRAM(s) Oral every 12 hours  simvastatin 20milliGRAM(s) Oral at bedtime  ALPRAZolam 0.25milliGRAM(s) Oral three times a day PRN  ATENolol  Tablet 25milliGRAM(s) Oral daily  cholecalciferol 1000Unit(s) Oral daily  pantoprazole    Tablet 40milliGRAM(s) Oral before breakfast  acetaminophen   Tablet 650milliGRAM(s) Oral every 6 hours PRN  vancomycin    Solution 125milliGRAM(s) Oral every 6 hours  influenza   Vaccine 0.5milliLiter(s) IntraMuscular once  saccharomyces boulardii 250milliGRAM(s) Oral two times a day      VITALS:  Vital Signs Last 24 Hrs  T(C): 37.1, Max: 38.5 (02-10 @ 00:23)  T(F): 98.7, Max: 101.3 (02-10 @ 00:23)  HR: 72 (69 - 77)  BP: 136/68 (110/50 - 136/68)  BP(mean): --  RR: 16 (16 - 16)  SpO2: 99% (96% - 99%) Daily     Daily   CAPILLARY BLOOD GLUCOSE    I&O's Summary    I & Os for current day (as of 10 Feb 2017 09:30)  =============================================  IN: 110 ml / OUT: 0 ml / NET: 110 ml      LABS:                        7.7    5.58  )-----------( 393      ( 09 Feb 2017 06:48 )             23.8     09 Feb 2017 06:48    136    |  99     |  13.0   ----------------------------<  135    4.0     |  26.0   |  0.87     Ca    10.4       09 Feb 2017 06:48        RECENT CULTURES:  02-09 .Stool feces XXXX XXXX   Testing in progress    02-08 .Stool Feces XXXX XXXX   No enteric pathogens to date: Final culture pending    02-07 .Blood Blood-Peripheral XXXX XXXX   No growth at 48 hours    02-07 .Urine Clean Catch (Midstream) XXXX XXXX   50,000 CFU/ml Staphylococcus species Identification and susceptibility to follow.25,000 CFU/ml Streptococcus species Identification and susceptibility to follow.  Culture in progress    02-05 .Urine Clean Catch (Midstream) XXXX XXXX   Culture grew 3 or more types of organisms which indicate collection contamination; consider recollection only if clinically  indicated.  notify IVETTE Ramsey    02-05 .Blood Blood XXXX XXXX   No growth at 48 hours

## 2017-02-10 NOTE — DISCHARGE NOTE ADULT - PLAN OF CARE
It is important to see your primary physician as well as the physicians noted below within the next week to perform a comprehensive medical review.  Call their offices for an appointment as soon as you leave the hospital.  If you do not have a primary physician, contact the [facility] [phone] located on [address] [place] [address].  Your medical issues appear to be stable at this time, but if your symptoms recur or worsen, contact your physicians and/or return to the hospital if necessary.  If you encounter any issues or questions with your medication, call your physicians before stopping the medication.  Do not drive.  Limit your diet to 2 grams of sodium daily. Stable for discharge Follow up with Gastroenterology No obvious infectious etiology. Follow up with Primary Care and ID as necessary. Follow up with Cardiology Await results of bone marrow biopsy. Follow thyroid function Continue protonix Stable bowel movements

## 2017-02-10 NOTE — CONSULT NOTE ADULT - SUBJECTIVE AND OBJECTIVE BOX
HPI:  76 years old female with PMH of HTN, A.Fib, dyslipidemia, Anxiety and abnormal colonoscopy on 11/2016 presented to the ER for the evaluation of continued bloody stools associated with fatigue and generalized weakness. She was seen in the ER 2 days ago for anemia and received PRBC. She was sent home to follow up with her PCP. Patient reports 1 month of fever for which she was seen by . He ordered blood work but didn't put her on any antibiotic. Patient also reports poor appetites, loss of weight and constipation. Patient also has a h/o colovaginal fistula for which she was seen by   but no surgical intervention was offered (per patient). Denies any abdominal pain, nausea, vomiting, SOB, dizziness or lightheadedness. (07 Feb 2017 16:29)    Patient reports that she has occasional air from her urethra or from the vagina.  No hematuria, dysuria currently.  Had a normal cystoscopy over a year ago but reports urinary incontinence wince the procedure.  She reports that her urologic issues are not the reason for her hospitalization and that she has not interest in seeing the urologist as an inpatient.        PAST MEDICAL & SURGICAL HISTORY:  Breast cancer: right lumpectomy RT/CT  Lung cancer: right CT/RT  GERD (gastroesophageal reflux disease)  Sinus problem  Hypothyroid  Hyperparathyroidism  Smoker  Hyperlipidemia  COPD (chronic obstructive pulmonary disease)  Anxiety  Palpitations  Atrial fibrillation: PAF  S/P lumpectomy, right breast: CT/RT  S/P partial lobectomy of lung: right      REVIEW OF SYSTEMS  MEDICATIONS  (STANDING):  flecainide 50milliGRAM(s) Oral every 12 hours  simvastatin 20milliGRAM(s) Oral at bedtime  ATENolol  Tablet 25milliGRAM(s) Oral daily  cholecalciferol 1000Unit(s) Oral daily  pantoprazole    Tablet 40milliGRAM(s) Oral before breakfast  vancomycin    Solution 125milliGRAM(s) Oral every 6 hours  saccharomyces boulardii 250milliGRAM(s) Oral two times a day    MEDICATIONS  (PRN):  ALPRAZolam 0.25milliGRAM(s) Oral three times a day PRN anxiety  acetaminophen   Tablet 650milliGRAM(s) Oral every 6 hours PRN For Temp greater than 38 C (100.4 F)      Allergies    adhesives (Rash)  Ceclor (Rash)  cephalosporins (Anaphylaxis)  codeine (Hives)  Demerol HCl (Hives)  penicillin (Short breath)  sulfa drugs (Hives)  Sulfac 10% (Anaphylaxis; Short breath)    Intolerances        SOCIAL HISTORY:    FAMILY HISTORY:  No pertinent family history in first degree relatives      Vital Signs Last 24 Hrs  T(C): 36.9, Max: 38.6 (02-10 @ 13:16)  T(F): 98.5, Max: 101.5 (02-10 @ 13:16)  HR: 65 (65 - 75)  BP: 100/44 (100/44 - 136/68)  BP(mean): --  RR: 16 (16 - 16)  SpO2: 99% (96% - 99%)    PHYSICAL EXAM:    aox3, nad    abd- soft, nt, nd, bs+, no masses        LABS:                        8.4    5.39  )-----------( 384      ( 10 Feb 2017 09:40 )             26.1     10 Feb 2017 09:40    136    |  98     |  15.0   ----------------------------<  94     4.8     |  27.0   |  0.67     Ca    10.8       10 Feb 2017 09:40            RADIOLOGY & ADDITIONAL STUDIES:    CT- No hydronephrosis, normal bladder contour

## 2017-02-10 NOTE — PROGRESS NOTE ADULT - ASSESSMENT
76 years old female with PMH of HTN, A.Fib, dyslipidemia, lung/breast cancer and abnormal colonoscopy in 11/2016 admitted with GI bleed.    > Cdif colitis, sigmoid thickening, colovaginal fistula - sx improving on Vancomycin.  Continue Abx x 14d.  Outpatient surgery followup.   > Fever, Noted M spike on recent SPEP, d/w Dr. Trinh.  Pt wishes to pursue outpatient hematology workup.   > Anemia / Gastrointestinal hemorrhage, unspecified gastrointestinal hemorrhage type. abnormal colonoscopy- hgb now stable, off ASA.  Continue PPI.    > Atrial fibrillation - rate controlled, continue Flecainide and Atenolol.  Holding antiplatelets, no anticoagulation due to bleeding.  > Hyperlipidemia.  Plan: on Zocor.   d/w patient in agreement. Contacted pt's son in law with her permission, Dr. Ram.  Discussed workup of fever including concerns of hematologic disorder with positive M spike.  CT findings, anemia, and treatment for Cdif was discussed.  Pt wishes for discharge as soon as possible with outpatient followup. 76 years old female with PMH of HTN, A.Fib, dyslipidemia, lung/breast cancer and abnormal colonoscopy in 11/2016 admitted with GI bleed.    > Cdif colitis, sigmoid thickening, colovaginal fistula - sx improving on Vancomycin.  Continue Abx x 14d.  Outpatient surgery followup.   > Fever, Noted M spike on recent SPEP, d/w Dr. Trinh.  Pt wishes to pursue outpatient hematology workup.   > Anemia / Gastrointestinal hemorrhage, unspecified gastrointestinal hemorrhage type. abnormal colonoscopy- hgb now stable, off ASA.  Continue PPI.    > Atrial fibrillation - rate controlled, continue Flecainide and Atenolol.  Holding antiplatelets, no anticoagulation due to bleeding.  > Hyperlipidemia.  Plan: on Zocor.   d/w patient in agreement. Contacted pt's son in law with her permission, Dr. Ram.  Discussed workup of fever including concerns of hematologic disorder with positive M spike.  CT findings, anemia, and treatment for Cdif was discussed.  Pt wishes for discharge as soon as possible with outpatient followup, pending repeat Hgb. 76 years old female with PMH of HTN, A.Fib, dyslipidemia, lung/breast cancer and abnormal colonoscopy in 11/2016 admitted with GI bleed.    > Cdif colitis, sigmoid thickening, colovaginal fistula - sx improving on Vancomycin.  Continue Abx x 14d.  Outpatient surgery followup.   > Fever, Noted M spike on recent SPEP, d/w Dr. Trinh.  Pt wishes to pursue outpatient hematology workup.  / GYN evaluation of possible colovaginal fistula.  Discussed with Dr. Avalos.   > Anemia / Gastrointestinal hemorrhage, unspecified gastrointestinal hemorrhage type. abnormal colonoscopy- hgb now stable, off ASA.  Continue PPI.    > Atrial fibrillation - rate controlled, continue Flecainide and Atenolol.  Holding antiplatelets, no anticoagulation due to bleeding.  > Hyperlipidemia.  Plan: on Zocor.   d/w patient in agreement. Contacted pt's son in law with her permission, Dr. Ram.  Discussed workup of fever including concerns of hematologic disorder with positive M spike.  CT findings, anemia, and treatment for Cdif was discussed.  Pt wishes for discharge as soon as possible with outpatient followup, pending repeat Hgb.

## 2017-02-10 NOTE — DISCHARGE NOTE ADULT - SECONDARY DIAGNOSIS.
Gastrointestinal hemorrhage, unspecified gastrointestinal hemorrhage type Fever due to unspecified condition Chronic atrial fibrillation Anemia, unspecified type Acquired hypothyroidism Gastroesophageal reflux disease, esophagitis presence not specified

## 2017-02-10 NOTE — DISCHARGE NOTE ADULT - PATIENT PORTAL LINK FT
“You can access the [employer] Patient Portal, offered by [facility], by registering with the following website: [email or web address]”

## 2017-02-10 NOTE — DISCHARGE NOTE ADULT - CARE PROVIDER_API CALL
Chas Orozco), Internal Medicine  [address] [place] [address]  Phone: [phone]  Fax: [phone]    Titi Prescott), Gastroenterology; Internal Medicine  [address] [place] [address]  Phone: [phone]  Fax: [phone]    Chase Mckenzie), Surgery  [address] [place] [address]  Phone: [phone]  Fax: [phone]

## 2017-02-10 NOTE — PROGRESS NOTE ADULT - PROBLEM SELECTOR PLAN 1
C diff positivity  - will treat as if C diff infection, However, no colitis on CT scan, and no prolong course of diarrhea throughout the 41 days of fever.   - vanco PO x 14 days    - oncological markers sent.  - Gen surgery Dr. Mckenzie's group following.  possible need for ex-lap if not other answers C diff positivity  - will treat as if C diff infection, However, no colitis on CT scan, and no prolong course of diarrhea throughout the 41 days of fever.   - vanco PO x 14 days       consulted - Dr. Hogan group  Gyn  consulted - Dr. Lester Mata    - will check Barium enema today.     - Gen surgery Dr. Mckenzie's group following.  possible need for ex-lap if not other answers

## 2017-02-10 NOTE — DISCHARGE NOTE ADULT - HOSPITAL COURSE
BRANNON GRANT    [id number]    76y      Female    HPI On admission: 75 y/o female with hx of HTN, Afib, dyslipidemia, Anxiety, and abnormal colonoscopy 11/2016 with sigmoid thickening, colovaginal fistula, presented to ED for evaluation of continued bloody stools associated with fatigue and generalized weakness.  She had been seen in ED 2 days prior for anemia and transfused 2 units PrBC.  Pt reported intermittent fever for past 4-6 weeks, seen by Nikita.  He ordered a blood work up but did not put her on antibiotics.  Pt also reported poor appetite, loss of weight and constipation.  Pt denied any abdominal pain this morning.  C/o of chills, current temp 100.5,  Pt reports she had one questionable bloody BM on Monday and none since. She had been having regular BMs.    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  Upon starting Vancomycin, pt's diarrhea and bleeding have resolved.  Seen by Surgery, recommended outpatient workup after treatment for Cdif.  Pt still with recurrent fevers x 6 weeks despite benign abdominal exam.   Discussed with ID, known to have positive M spike on SPEP.  Hematologic workup pending as outpatient. BRANNON ADAMVAN    [id number]    76y      Female    HPI On admission: 75 y/o female with hx of HTN, Afib, dyslipidemia, Anxiety, and abnormal colonoscopy 11/2016 with sigmoid thickening, colovaginal fistula, presented to ED for evaluation of continued bloody stools associated with fatigue and generalized weakness.  She had been seen in ED 2 days prior for anemia and transfused 2 units PrBC.  Pt reported intermittent fever for past 4-6 weeks, seen by Nikita.  He ordered a blood work up but did not put her on antibiotics.  Pt also reported poor appetite, loss of weight and constipation.  Pt denied any abdominal pain this morning.  C/o of chills, current temp 100.5,  Pt reports she had one questionable bloody BM on Monday and none since. She had been having regular BMs.    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  Upon starting Vancomycin, pt's diarrhea and bleeding have resolved.  Seen by Surgery, recommended outpatient workup after treatment for Cdif.  Pt still with recurrent fevers x 6 weeks despite benign abdominal exam.   Discussed with ID, known to have positive M spike on SPEP.  Hematologic workup pending as outpatient. Repeat Hgb 8.4, has been stable.  ASA on hold for next 10 days with outpt repeat Hgb with PMD.  D/w Dr. Orozco. 76 years old female with PMH of HTN, A.Fib, dyslipidemia, Anxiety and abnormal colonoscopy on 11/2016 presented to the ER for the evaluation of continued bloody stools associated with fatigue and generalized weakness. She was seen in the ER 2 days ago for anemia and received PRBC. She was sent home to follow up with her PCP. Patient reports 1 month of fever for which she was seen by . He ordered blood work but didn't put her on any antibiotic. Patient also reports poor appetites, loss of weight and constipation. Patient also has a h/o colovaginal fistula for which she was seen by   but no surgical intervention was offered (per patient). Denies any abdominal pain, nausea, vomiting, SOB, dizziness or lightheadedness. 76 years old female with PMH of HTN, A.Fib, dyslipidemia, Anxiety and abnormal colonoscopy on 11/2016 presented to the ER for the evaluation of continued bloody stools associated with fatigue and generalized weakness. She was seen in the ER 2 days ago for anemia and received PRBC. She was sent home to follow up with her PCP. Patient reports 1 month of fever for which she was seen by . He ordered blood work but didn't put her on any antibiotic. Patient also reports poor appetites, loss of weight and constipation. Patient also has a h/o colovaginal fistula for which she was seen by   but no surgical intervention was offered (per patient). Denies any abdominal pain, nausea, vomiting, SOB, dizziness or lightheadedness.  Patient had a prolonged hospitalization. She was originally evaluated for GI bleed. All anticoagulation was discontinued secondary to GI bleed and anemia. She does have a fib and may need to resume anticoagulation if bleeding stops and hemoglobin stabilizes.  She was treated for c diff with oral vanco and has now completed her therapy and diarrhea has resolved.  She has some colonic stricture and fistula being followed by Dr Mckenzie from surgery.  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 Mrs Brown is to be discharged today and follow up the results as an outpatient.  She had significant anemia requiring blood transfusion two units on 2/17/2017.

## 2017-02-10 NOTE — PROGRESS NOTE ADULT - SUBJECTIVE AND OBJECTIVE BOX
Patient is a 76y old  Female who presents with a chief complaint of Bloody stools. (07 Feb 2017 16:29)      HPI:  76 years old female with PMH of HTN, A.Fib, dyslipidemia, Anxiety and abnormal colonoscopy on 11/2016 presented to the ER for the evaluation of lethargy and fevers. Pat found to have anemia. virtal CT as oupt showed narrowing of sigmoid. Cea normal in hospital. + C difficle. Pt Denies diarrhea . No abdominal pain. No rectal bleeding in hospital ( please note, pt denied to me overt rectal bleeding as outpatient as well)    REVIEW OF SYSTEMS:  Constitutional: No fever, weight loss or fatigue  ENMT:  No difficulty hearing, tinnitus, vertigo; No sinus or throat pain  Respiratory: No cough, wheezing, chills or hemoptysis  Cardiovascular: No chest pain, palpitations, dizziness or leg swelling  Gastrointestinal: No abdominal or epigastric pain. No nausea, vomiting or hematemesis; No diarrhea or constipation. No melena or hematochezia.  Skin: No itching, burning, rashes or lesions   Musculoskeletal: No joint pain or swelling; No muscle, back or extremity pain    PAST MEDICAL & SURGICAL HISTORY:  Breast cancer: right lumpectomy RT/CT  Lung cancer: right CT/RT  GERD (gastroesophageal reflux disease)  Sinus problem  Hypothyroid  Hyperparathyroidism  Smoker  Hyperlipidemia  COPD (chronic obstructive pulmonary disease)  Anxiety  Palpitations  Atrial fibrillation: PAF  S/P lumpectomy, right breast: CT/RT  S/P partial lobectomy of lung: right      FAMILY HISTORY:  No pertinent family history in first degree relatives      SOCIAL HISTORY:  Smoking Status: [ ] Current, [ ] Former, [ ] Never  Pack Years:  [  ] EtOH  [  ] IVDA    MEDICATIONS:  MEDICATIONS  (STANDING):  flecainide 50milliGRAM(s) Oral every 12 hours  simvastatin 20milliGRAM(s) Oral at bedtime  ATENolol  Tablet 25milliGRAM(s) Oral daily  cholecalciferol 1000Unit(s) Oral daily  pantoprazole    Tablet 40milliGRAM(s) Oral before breakfast  vancomycin    Solution 125milliGRAM(s) Oral every 6 hours  influenza   Vaccine 0.5milliLiter(s) IntraMuscular once    MEDICATIONS  (PRN):  ALPRAZolam 0.25milliGRAM(s) Oral three times a day PRN anxiety  acetaminophen   Tablet 650milliGRAM(s) Oral every 6 hours PRN For Temp greater than 38 C (100.4 F)      Allergies    adhesives (Rash)  Ceclor (Rash)  cephalosporins (Anaphylaxis)  codeine (Hives)  Demerol HCl (Hives)  penicillin (Short breath)  sulfa drugs (Hives)  Sulfac 10% (Anaphylaxis; Short breath)    Intolerances        Vital Signs Last 24 Hrs  T(C): 37.1, Max: 38.5 (02-10 @ 00:23)  T(F): 98.7, Max: 101.3 (02-10 @ 00:23)  HR: 72 (68 - 77)  BP: 136/68 (110/50 - 136/68)  BP(mean): --  RR: 16 (16 - 16)  SpO2: 99% (96% - 99%)    I & Os for current day (as of 02-10 @ 08:43)  =============================================  IN: 110 ml / OUT: 0 ml / NET: 110 ml        PHYSICAL EXAM:    General: Well developed; well nourished; in no acute distress  HEENT: MMM, conjunctiva and sclera clear  Gastrointestinal: Soft, non-tender non-distended; Normal bowel sounds; No rebound or guarding  Extremities: Normal range of motion, No clubbing, cyanosis or edema  Neurological: Alert and oriented x3  Skin: Warm and dry. No obvious rash      LABS:                        7.7    5.58  )-----------( 393      ( 09 Feb 2017 06:48 )             23.8     09 Feb 2017 06:48    136    |  99     |  13.0   ----------------------------<  135    4.0     |  26.0   |  0.87     Ca    10.4       09 Feb 2017 06:48        Culture Results:   Testing in progress (02-09 @ 17:39)          RADIOLOGY & ADDITIONAL STUDIES:

## 2017-02-11 PROCEDURE — 99233 SBSQ HOSP IP/OBS HIGH 50: CPT

## 2017-02-11 RX ADMIN — Medication 125 MILLIGRAM(S): at 23:44

## 2017-02-11 RX ADMIN — Medication 50 MILLIGRAM(S): at 18:19

## 2017-02-11 RX ADMIN — SIMVASTATIN 20 MILLIGRAM(S): 20 TABLET, FILM COATED ORAL at 23:45

## 2017-02-11 RX ADMIN — ATENOLOL 25 MILLIGRAM(S): 25 TABLET ORAL at 06:01

## 2017-02-11 RX ADMIN — Medication 650 MILLIGRAM(S): at 15:49

## 2017-02-11 RX ADMIN — Medication 1000 UNIT(S): at 13:00

## 2017-02-11 RX ADMIN — Medication 250 MILLIGRAM(S): at 18:19

## 2017-02-11 RX ADMIN — Medication 125 MILLIGRAM(S): at 18:19

## 2017-02-11 RX ADMIN — Medication 50 MILLIGRAM(S): at 06:01

## 2017-02-11 RX ADMIN — Medication 250 MILLIGRAM(S): at 06:01

## 2017-02-11 RX ADMIN — Medication 125 MILLIGRAM(S): at 13:00

## 2017-02-11 RX ADMIN — PANTOPRAZOLE SODIUM 40 MILLIGRAM(S): 20 TABLET, DELAYED RELEASE ORAL at 06:01

## 2017-02-11 RX ADMIN — Medication 125 MILLIGRAM(S): at 06:00

## 2017-02-11 NOTE — PROGRESS NOTE ADULT - PROBLEM SELECTOR PLAN 2
Vancomycin qid. Pt will have elective surgery for narrowing of sigmoid ( ? mass vs stricture) when C diff resolves. Pt had workup for possible  rectovaginal fistula with GYN and surgery. At this point surgery will address it at time of sugery

## 2017-02-11 NOTE — PROGRESS NOTE ADULT - ASSESSMENT
76 years old female with PMH of HTN, A.Fib, dyslipidemia, lung/breast cancer and abnormal colonoscopy in 11/2016 admitted with GI bleed.    > Cdif colitis, sigmoid thickening, colovaginal fistula - sx improving on Vancomycin.  Continue Abx x 14d. Florastor. Outpatient surgery followup.   > Fever, Noted M spike on recent SPEP, d/w Dr. Trinh.  Pt wishes to pursue outpatient hematology workup.  / GYN evaluation of possible colovaginal fistula.    > Anemia / Gastrointestinal hemorrhage, unspecified gastrointestinal hemorrhage type. abnormal colonoscopy- hgb now stable, off ASA.  Continue PPI.    > Atrial fibrillation - rate controlled, continue Flecainide and Atenolol.  Holding antiplatelets, no anticoagulation due to bleeding.  > Hyperlipidemia.  Plan: on Zocor.   DVT prophylaxis - VCD  d/w patient in agreement.   Pt wishes to continue evaluation for 6 weeks fever, fatigues - wants to defer any kind of surgical exploration, as per her and her families wishes.    Will review prior work up as well as discuss with other specialists.

## 2017-02-11 NOTE — PROGRESS NOTE ADULT - SUBJECTIVE AND OBJECTIVE BOX
Patient is a 76y old  Female who presents with a chief complaint of Bloody stools. (10 Feb 2017 09:36)      HPI:  No complaints of diarrhea, Same chronic complaints of stool coming from vaginal area.  No abdominal pain. Still spiking fevers 101      REVIEW OF SYSTEMS:  Constitutional: No fever, weight loss or fatigue  ENMT:  No difficulty hearing, tinnitus, vertigo; No sinus or throat pain  Respiratory: No cough, wheezing, chills or hemoptysis  Cardiovascular: No chest pain, palpitations, dizziness or leg swelling  Gastrointestinal: No abdominal or epigastric pain. No nausea, vomiting or hematemesis; No diarrhea or constipation. No melena or hematochezia.  Skin: No itching, burning, rashes or lesions   Musculoskeletal: No joint pain or swelling; No muscle, back or extremity pain    PAST MEDICAL & SURGICAL HISTORY:  Breast cancer: right lumpectomy RT/CT  Lung cancer: right CT/RT  GERD (gastroesophageal reflux disease)  Sinus problem  Hypothyroid  Hyperparathyroidism  Smoker  Hyperlipidemia  COPD (chronic obstructive pulmonary disease)  Anxiety  Palpitations  Atrial fibrillation: PAF  S/P lumpectomy, right breast: CT/RT  S/P partial lobectomy of lung: right      FAMILY HISTORY:  No pertinent family history in first degree relatives      SOCIAL HISTORY:  Smoking Status: [ ] Current, [ ] Former, [ ] Never  Pack Years:  [  ] EtOH  [  ] IVDA    MEDICATIONS:  MEDICATIONS  (STANDING):  flecainide 50milliGRAM(s) Oral every 12 hours  simvastatin 20milliGRAM(s) Oral at bedtime  ATENolol  Tablet 25milliGRAM(s) Oral daily  cholecalciferol 1000Unit(s) Oral daily  pantoprazole    Tablet 40milliGRAM(s) Oral before breakfast  vancomycin    Solution 125milliGRAM(s) Oral every 6 hours  saccharomyces boulardii 250milliGRAM(s) Oral two times a day    MEDICATIONS  (PRN):  ALPRAZolam 0.25milliGRAM(s) Oral three times a day PRN anxiety  acetaminophen   Tablet 650milliGRAM(s) Oral every 6 hours PRN For Temp greater than 38 C (100.4 F)      Allergies    adhesives (Rash)  Ceclor (Rash)  cephalosporins (Anaphylaxis)  codeine (Hives)  Demerol HCl (Hives)  penicillin (Short breath)  sulfa drugs (Hives)  Sulfac 10% (Anaphylaxis; Short breath)    Intolerances        Vital Signs Last 24 Hrs  T(C): 37.7, Max: 38.6 (02-10 @ 13:16)  T(F): 99.8, Max: 101.5 (02-10 @ 13:16)  HR: 73 (65 - 78)  BP: 110/52 (100/44 - 110/52)  BP(mean): --  RR: 16 (16 - 16)  SpO2: 97% (97% - 99%)    I & Os for current day (as of 02-11 @ 09:09)  =============================================  IN: 240 ml / OUT: 0 ml / NET: 240 ml        PHYSICAL EXAM:    General: Well developed; well nourished; in no acute distress  HEENT: MMM, conjunctiva and sclera clear  Gastrointestinal: Soft, non-tender non-distended; Normal bowel sounds; No rebound or guarding  Extremities: Normal range of motion, No clubbing, cyanosis or edema  Neurological: Alert and oriented x3  Skin: Warm and dry. No obvious rash      LABS:                        8.4    5.39  )-----------( 384      ( 10 Feb 2017 09:40 )             26.1     10 Feb 2017 09:40    136    |  98     |  15.0   ----------------------------<  94     4.8     |  27.0   |  0.67     Ca    10.8       10 Feb 2017 09:40        Culture Results:   Giardia antigen negative performed by rapid immunoassay (non-enzymatic).  (It is recommended that all specimens tested by  an immunochromatographic card test be  confirmed by another method.) (02-10 @ 23:25)    Ferritin, Serum: 477.0 ng/mL (02-10 @ 21:32)  Iron Total, Serum: 13 ug/dL (02-10 @ 21:32)  Iron Total, Serum: 13 ug/dL (02-10 @ 21:32)  Iron - Total Binding Capacity.: 180 ug/dL (02-10 @ 21:32)        RADIOLOGY & ADDITIONAL STUDIES:

## 2017-02-11 NOTE — PROGRESS NOTE ADULT - SUBJECTIVE AND OBJECTIVE BOX
HOSPITALIST PROGRESS NOTE    BRANNON GRANT  [id number]  76yFemale    Patient is a 76y old  Female who presents with a chief complaint of Bloody stools. (10 Feb 2017 09:36)    HPI:  76 years old female with PMH of HTN, A.Fib, dyslipidemia, Anxiety and abnormal colonoscopy on 11/2016 presented to the ER for the evaluation of continued bloody stools associated with fatigue and generalized weakness. She was seen in the ER 2 days ago for anemia and received PRBC. She was sent home to follow up with her PCP. Patient reports 1 month of fever for which she was seen by . He ordered blood work but didn't put her on any antibiotic. Patient also reports poor appetites, loss of weight and constipation. Patient also has a h/o colovaginal fistula for which she was seen by   but no surgical intervention was offered (per patient). Denies any abdominal pain, nausea, vomiting, SOB, dizziness or lightheadedness. (07 Feb 2017 16:29)    PAST MEDICAL & SURGICAL HISTORY:  Breast cancer: right lumpectomy RT/CT  Lung cancer: right CT/RT  GERD (gastroesophageal reflux disease)  Sinus problem  Hypothyroid  Hyperparathyroidism  Smoker  Hyperlipidemia  COPD (chronic obstructive pulmonary disease)  Anxiety  Palpitations  Atrial fibrillation: PAF  S/P lumpectomy, right breast: CT/RT  S/P partial lobectomy of lung: right    SUBJECTIVE: Chart reviewed since last visit. Patient seen and examined at bedside. Still gets chills and fever and feels tired and sleepy. Denies any diarrhea, nausea, vomiting or abdominal pain ,but still having feculent vaginal discharge.      OBJECTIVE:  Vital Signs Last 24 Hrs  T(C): 37.4, Max: 38.9 (02-11 @ 15:48)  T(F): 99.3, Max: 102 (02-11 @ 15:48)  HR: 68 (68 - 74)  BP: 106/48 (106/48 - 132/54)  RR: 16 (16 - 16)  SpO2: 97% (97% - 97%)    PHYSICAL EXAMINATION  General: NAD[+]   Frail  HEENT: AT/NC[+]   NECK: Supple[+]    CVS: RRR[+]  Irregular[]  S1+S2[+]     RESP: Fair air entry bilaterally[+]   Clear sounds[+]     GI: Soft[+]  Nondistended[+]   Nontender[+]   Bowel Sounds[+]    : suprapubic tenderness[-]   CVA Tenderness[-]    MS: FROM[+]   Edema[-]  CNS: AAOx3[+]   Grossly intact  INTEG: Skin is Warm[+]   PSYCH: Fair Mood, Affect            I&O's Summary    I & Os for current day (as of 11 Feb 2017 22:16)  =============================================  IN: 240 ml / OUT: 0 ml / NET: 240 ml                          8.4    5.39  )-----------( 384      ( 10 Feb 2017 09:40 )             26.1       10 Feb 2017 09:40    136    |  98     |  15.0   ----------------------------<  94     4.8     |  27.0   |  0.67     Ca    10.8       10 Feb 2017 09:40        Culture - Blood (02.07.17 @ 18:26)    Specimen Source: .Blood Blood-Peripheral    Culture Results:   No growth at 48 hours    Culture - Stool (02.08.17 @ 18:54)    Specimen Source: .Stool Feces    Culture Results:   No enteric pathogens isolated.  (Stool culture examined for Salmonella,  Shigella, Campylobacter, Aeromonas, Plesiomonas,  Vibrio, E.coli O157 and Yersinia)    Culture - Urine (02.07.17 @ 16:11)    -  Ampicillin: R >8    -  Cefazolin: R <=4    -  Penicillin: R >8    -  Trimethoprim/Sulfamethoxazole: S <=0.5/9.5    -  Ampicillin: S <=2    -  Ampicillin/Sulbactam: R <=8/4    -  Gentamicin: S <=4    -  Levofloxacin: R >4    -  Oxacillin: R >2    -  Tetra/Doxy: S <=4    -  Vancomycin: S 1    -  Ciprofloxacin: R >2    -  Nitrofurantoin: S <=32    -  Nitrofurantoin: S <=32    -  Tetra/Doxy: R >8    -  Linezolid: S <=1    -  RIF- Rifampin: S <=1    -  Vancomycin: S 2    Specimen Source: .Urine Clean Catch (Midstream)    Culture Results:   50,000 CFU/ml Coag Negative Staphylococcus  25,000 CFU/ml Enterococcus raffinosus    Organism Identification: Coag Negative Staphylococcus  Enterococcus raffinosus    Organism: Enterococcus raffinosus    Organism: Coag Negative Staphylococcus    Method Type: ALPHONSE    Method Type: ALPHONSE    EXAM:  CT ABDOMEN AND PELVIS OC                          PROCEDURE DATE:  02/08/2017    FINDINGS:    LOWER CHEST: Chronic right pleural effusion and postsurgical changes   right lung base. 5 mm left lower lobe nodule, similar to 2012.    ABDOMEN:  LIVER: Multiple cysts and hypodensities small to characterize  BILE DUCTS: normal caliber  GALLBLADDER: no calcified gallstones. normal caliber wall  PANCREAS: within normal limits  SPLEEN: within normal limits  ADRENALS: within normal limits  KIDNEYS: within normal limits    PELVIS:  REPRODUCTIVE ORGANS: no pelvic masses, hysterectomy  BLADDER: within normal limits    BOWEL: Extensive sigmoid diverticulosis with chronic muscular   hypertrophy, similar to prior. No inflammatory changes or evidence of   bowel obstruction. Question mild thickening of the cecum. The appendix is   not visualized.  PERITONEUM: no ascites or free air, no fluid collection  RETROPERITONEUM: no enlarged retroperitoneal or pelvic nodes.    VESSELS: Multifocal vascular calcifications.  ABDOMINAL WALL: within normal limits.  MUSCULOSKELETAL: Right hip adductor intramuscular lipoma.    IMPRESSION:     No acute findings. Sigmoid diverticulosis with chronic muscular   hypertrophy without acute associated findings. Question mild thickening   of the cecum which may be inflammatory or infectious.                      TEO RUBIO M.D., ATTENDING RADIOLOGIST  This document has been electronically signed. Feb 8 2017 12:32PM      MEDICATIONS  (STANDING):  flecainide 50milliGRAM(s) Oral every 12 hours  simvastatin 20milliGRAM(s) Oral at bedtime  ATENolol  Tablet 25milliGRAM(s) Oral daily  cholecalciferol 1000Unit(s) Oral daily  pantoprazole    Tablet 40milliGRAM(s) Oral before breakfast  vancomycin    Solution 125milliGRAM(s) Oral every 6 hours  saccharomyces boulardii 250milliGRAM(s) Oral two times a day      MEDICATIONS  (PRN):  ALPRAZolam 0.25milliGRAM(s) Oral three times a day PRN anxiety  acetaminophen   Tablet 650milliGRAM(s) Oral every 6 hours PRN For Temp greater than 38 C (100.4 F) HOSPITALIST PROGRESS NOTE    BRANNON GRANT  [id number]  76yFemale    Patient is a 76y old  Female who presents with a chief complaint of Bloody stools. (10 Feb 2017 09:36)    HPI:  76 years old female with PMH of HTN, A.Fib, dyslipidemia, Anxiety and abnormal colonoscopy on 11/2016 presented to the ER for the evaluation of continued bloody stools associated with fatigue and generalized weakness. She was seen in the ER 2 days ago for anemia and received PRBC. She was sent home to follow up with her PCP. Patient reports 1 month of fever for which she was seen by . He ordered blood work but didn't put her on any antibiotic. Patient also reports poor appetites, loss of weight and constipation. Patient also has a h/o colovaginal fistula for which she was seen by   but no surgical intervention was offered (per patient). Denies any abdominal pain, nausea, vomiting, SOB, dizziness or lightheadedness. (07 Feb 2017 16:29)    PAST MEDICAL & SURGICAL HISTORY:  Breast cancer: right lumpectomy RT/CT  Lung cancer: right CT/RT  GERD (gastroesophageal reflux disease)  Sinus problem  Hypothyroid  Hyperparathyroidism  Smoker  Hyperlipidemia  COPD (chronic obstructive pulmonary disease)  Anxiety  Palpitations  Atrial fibrillation: PAF  S/P lumpectomy, right breast: CT/RT  S/P partial lobectomy of lung: right    SUBJECTIVE: Chart reviewed since last visit. Patient seen and examined at bedside. Still gets chills and fever and feels tired and sleepy. Denies any diarrhea, nausea, vomiting or abdominal pain ,but still having feculent vaginal discharge.      OBJECTIVE:  Vital Signs Last 24 Hrs  T(C): 37.4, Max: 38.9 (02-11 @ 15:48)  T(F): 99.3, Max: 102 (02-11 @ 15:48)  HR: 68 (68 - 74)  BP: 106/48 (106/48 - 132/54)  RR: 16 (16 - 16)  SpO2: 97% (97% - 97%)    PHYSICAL EXAMINATION  General: NAD[+]   Frail  HEENT: AT/NC[+]   NECK: Supple[+]    CVS: RRR[+]  Irregular[]  S1+S2[+]     RESP: Fair air entry bilaterally[+]   Clear sounds[+]   Decreased sounds right base  GI: Soft[+]  Nondistended[+]   Nontender[+]   Bowel Sounds[+]    : suprapubic tenderness[-]   CVA Tenderness[-]    MS: FROM[+]   Edema[-]  CNS: AAOx3[+]   Grossly intact  INTEG: Skin is Warm[+]   PSYCH: Fair Mood, Affect            I&O's Summary    I & Os for current day (as of 11 Feb 2017 22:16)  =============================================  IN: 240 ml / OUT: 0 ml / NET: 240 ml                          8.4    5.39  )-----------( 384      ( 10 Feb 2017 09:40 )             26.1       10 Feb 2017 09:40    136    |  98     |  15.0   ----------------------------<  94     4.8     |  27.0   |  0.67     Ca    10.8       10 Feb 2017 09:40        Culture - Blood (02.07.17 @ 18:26)    Specimen Source: .Blood Blood-Peripheral    Culture Results:   No growth at 48 hours    Culture - Stool (02.08.17 @ 18:54)    Specimen Source: .Stool Feces    Culture Results:   No enteric pathogens isolated.  (Stool culture examined for Salmonella,  Shigella, Campylobacter, Aeromonas, Plesiomonas,  Vibrio, E.coli O157 and Yersinia)    Culture - Urine (02.07.17 @ 16:11)    -  Ampicillin: R >8    -  Cefazolin: R <=4    -  Penicillin: R >8    -  Trimethoprim/Sulfamethoxazole: S <=0.5/9.5    -  Ampicillin: S <=2    -  Ampicillin/Sulbactam: R <=8/4    -  Gentamicin: S <=4    -  Levofloxacin: R >4    -  Oxacillin: R >2    -  Tetra/Doxy: S <=4    -  Vancomycin: S 1    -  Ciprofloxacin: R >2    -  Nitrofurantoin: S <=32    -  Nitrofurantoin: S <=32    -  Tetra/Doxy: R >8    -  Linezolid: S <=1    -  RIF- Rifampin: S <=1    -  Vancomycin: S 2    Specimen Source: .Urine Clean Catch (Midstream)    Culture Results:   50,000 CFU/ml Coag Negative Staphylococcus  25,000 CFU/ml Enterococcus raffinosus    Organism Identification: Coag Negative Staphylococcus  Enterococcus raffinosus    Organism: Enterococcus raffinosus    Organism: Coag Negative Staphylococcus    Method Type: ALPHONSE    Method Type: ALPHONSE    EXAM:  CT ABDOMEN AND PELVIS OC                          PROCEDURE DATE:  02/08/2017    FINDINGS:    LOWER CHEST: Chronic right pleural effusion and postsurgical changes   right lung base. 5 mm left lower lobe nodule, similar to 2012.    ABDOMEN:  LIVER: Multiple cysts and hypodensities small to characterize  BILE DUCTS: normal caliber  GALLBLADDER: no calcified gallstones. normal caliber wall  PANCREAS: within normal limits  SPLEEN: within normal limits  ADRENALS: within normal limits  KIDNEYS: within normal limits    PELVIS:  REPRODUCTIVE ORGANS: no pelvic masses, hysterectomy  BLADDER: within normal limits    BOWEL: Extensive sigmoid diverticulosis with chronic muscular   hypertrophy, similar to prior. No inflammatory changes or evidence of   bowel obstruction. Question mild thickening of the cecum. The appendix is   not visualized.  PERITONEUM: no ascites or free air, no fluid collection  RETROPERITONEUM: no enlarged retroperitoneal or pelvic nodes.    VESSELS: Multifocal vascular calcifications.  ABDOMINAL WALL: within normal limits.  MUSCULOSKELETAL: Right hip adductor intramuscular lipoma.    IMPRESSION:     No acute findings. Sigmoid diverticulosis with chronic muscular   hypertrophy without acute associated findings. Question mild thickening   of the cecum which may be inflammatory or infectious.                      TEO RUBIO M.D., ATTENDING RADIOLOGIST  This document has been electronically signed. Feb 8 2017 12:32PM      MEDICATIONS  (STANDING):  flecainide 50milliGRAM(s) Oral every 12 hours  simvastatin 20milliGRAM(s) Oral at bedtime  ATENolol  Tablet 25milliGRAM(s) Oral daily  cholecalciferol 1000Unit(s) Oral daily  pantoprazole    Tablet 40milliGRAM(s) Oral before breakfast  vancomycin    Solution 125milliGRAM(s) Oral every 6 hours  saccharomyces boulardii 250milliGRAM(s) Oral two times a day      MEDICATIONS  (PRN):  ALPRAZolam 0.25milliGRAM(s) Oral three times a day PRN anxiety  acetaminophen   Tablet 650milliGRAM(s) Oral every 6 hours PRN For Temp greater than 38 C (100.4 F)

## 2017-02-12 LAB
CULTURE RESULTS: SIGNIFICANT CHANGE UP
CULTURE RESULTS: SIGNIFICANT CHANGE UP
SPECIMEN SOURCE: SIGNIFICANT CHANGE UP
SPECIMEN SOURCE: SIGNIFICANT CHANGE UP

## 2017-02-12 PROCEDURE — 99233 SBSQ HOSP IP/OBS HIGH 50: CPT

## 2017-02-12 RX ADMIN — Medication 650 MILLIGRAM(S): at 02:12

## 2017-02-12 RX ADMIN — Medication 125 MILLIGRAM(S): at 06:11

## 2017-02-12 RX ADMIN — Medication 250 MILLIGRAM(S): at 18:36

## 2017-02-12 RX ADMIN — Medication 250 MILLIGRAM(S): at 06:11

## 2017-02-12 RX ADMIN — Medication 50 MILLIGRAM(S): at 18:36

## 2017-02-12 RX ADMIN — ATENOLOL 25 MILLIGRAM(S): 25 TABLET ORAL at 06:11

## 2017-02-12 RX ADMIN — Medication 125 MILLIGRAM(S): at 23:00

## 2017-02-12 RX ADMIN — Medication 125 MILLIGRAM(S): at 12:38

## 2017-02-12 RX ADMIN — Medication 650 MILLIGRAM(S): at 16:05

## 2017-02-12 RX ADMIN — Medication 50 MILLIGRAM(S): at 06:11

## 2017-02-12 RX ADMIN — Medication 125 MILLIGRAM(S): at 18:36

## 2017-02-12 RX ADMIN — PANTOPRAZOLE SODIUM 40 MILLIGRAM(S): 20 TABLET, DELAYED RELEASE ORAL at 06:11

## 2017-02-12 RX ADMIN — Medication 1000 UNIT(S): at 12:38

## 2017-02-12 RX ADMIN — SIMVASTATIN 20 MILLIGRAM(S): 20 TABLET, FILM COATED ORAL at 22:55

## 2017-02-12 NOTE — PROGRESS NOTE ADULT - SUBJECTIVE AND OBJECTIVE BOX
Patient is a 76y old  Female who presents with a chief complaint of Bloody stools. (10 Feb 2017 09:36)      HPI:  76 years old female with PMH of HTN, A.Fib, dyslipidemia, Pt had 2 loose stool yesterday (small volume though). No rectal bleeding. had fever 102            REVIEW OF SYSTEMS:  Constitutional: No fever, weight loss or fatigue  ENMT:  No difficulty hearing, tinnitus, vertigo; No sinus or throat pain  Respiratory: No cough, wheezing, chills or hemoptysis  Cardiovascular: No chest pain, palpitations, dizziness or leg swelling  Gastrointestinal: No abdominal or epigastric pain. No nausea, vomiting or hematemesis; No diarrhea or constipation. No melena or hematochezia.  Skin: No itching, burning, rashes or lesions   Musculoskeletal: No joint pain or swelling; No muscle, back or extremity pain    PAST MEDICAL & SURGICAL HISTORY:  Breast cancer: right lumpectomy RT/CT  Lung cancer: right CT/RT  GERD (gastroesophageal reflux disease)  Sinus problem  Hypothyroid  Hyperparathyroidism  Smoker  Hyperlipidemia  COPD (chronic obstructive pulmonary disease)  Anxiety  Palpitations  Atrial fibrillation: PAF  S/P lumpectomy, right breast: CT/RT  S/P partial lobectomy of lung: right      FAMILY HISTORY:  No pertinent family history in first degree relatives      SOCIAL HISTORY:  Smoking Status: [ ] Current, [ ] Former, [ ] Never  Pack Years:  [  ] EtOH  [  ] IVDA    MEDICATIONS:  MEDICATIONS  (STANDING):  flecainide 50milliGRAM(s) Oral every 12 hours  simvastatin 20milliGRAM(s) Oral at bedtime  ATENolol  Tablet 25milliGRAM(s) Oral daily  cholecalciferol 1000Unit(s) Oral daily  pantoprazole    Tablet 40milliGRAM(s) Oral before breakfast  vancomycin    Solution 125milliGRAM(s) Oral every 6 hours  saccharomyces boulardii 250milliGRAM(s) Oral two times a day    MEDICATIONS  (PRN):  ALPRAZolam 0.25milliGRAM(s) Oral three times a day PRN anxiety  acetaminophen   Tablet 650milliGRAM(s) Oral every 6 hours PRN For Temp greater than 38 C (100.4 F)      Allergies    adhesives (Rash)  Ceclor (Rash)  cephalosporins (Anaphylaxis)  codeine (Hives)  Demerol HCl (Hives)  penicillin (Short breath)  sulfa drugs (Hives)  Sulfac 10% (Anaphylaxis; Short breath)    Intolerances        Vital Signs Last 24 Hrs  T(C): 36.7, Max: 38.9 (02-11 @ 15:48)  T(F): 98, Max: 102 (02-11 @ 15:48)  HR: 77 (68 - 77)  BP: 102/48 (102/48 - 132/54)  BP(mean): --  RR: 18 (16 - 18)  SpO2: 98% (96% - 98%)        PHYSICAL EXAM:    General: Well developed; well nourished; in no acute distress  HEENT: MMM, conjunctiva and sclera clear  Gastrointestinal: Soft, non-tender non-distended; Normal bowel sounds; No rebound or guarding  Extremities: Normal range of motion, No clubbing, cyanosis or edema  Neurological: Alert and oriented x3  Skin: Warm and dry. No obvious rash      LABS:                        8.4    5.39  )-----------( 384      ( 10 Feb 2017 09:40 )             26.1     10 Feb 2017 09:40    136    |  98     |  15.0   ----------------------------<  94     4.8     |  27.0   |  0.67     Ca    10.8       10 Feb 2017 09:40                RADIOLOGY & ADDITIONAL STUDIES:

## 2017-02-12 NOTE — PROGRESS NOTE ADULT - SUBJECTIVE AND OBJECTIVE BOX
HOSPITALIST PROGRESS NOTE    BRANNON GRANT  [id number]  76yFemale    Patient is a 76y old  Female who presents with a chief complaint of Bloody stools. (10 Feb 2017 09:36)    HPI:  76 years old female with PMH of HTN, A.Fib, dyslipidemia, Anxiety and abnormal colonoscopy on 11/2016 presented to the ER for the evaluation of continued bloody stools associated with fatigue and generalized weakness. She was seen in the ER 2 days ago for anemia and received PRBC. She was sent home to follow up with her PCP. Patient reports 1 month of fever for which she was seen by . He ordered blood work but didn't put her on any antibiotic. Patient also reports poor appetites, loss of weight and constipation. Patient also has a h/o colovaginal fistula for which she was seen by   but no surgical intervention was offered (per patient). Denies any abdominal pain, nausea, vomiting, SOB, dizziness or lightheadedness. (07 Feb 2017 16:29)    PAST MEDICAL & SURGICAL HISTORY:  Breast cancer: right lumpectomy RT/CT  Lung cancer: right CT/RT  GERD (gastroesophageal reflux disease)  Sinus problem  Hypothyroid  Hyperparathyroidism  Smoker  Hyperlipidemia  COPD (chronic obstructive pulmonary disease)  Anxiety  Palpitations  Atrial fibrillation: PAF  S/P lumpectomy, right breast: CT/RT  S/P partial lobectomy of lung: right    SUBJECTIVE: Chart reviewed since last visit. Patient seen and examined at bedside. Still feels fatigued, has chills and fevers.   Denies any headache, but get sinus - post nasal drip. Denies facial pain. NO chest pain, dyspnea, cough, palpitations, nausea, vomiting, diarrhea, abdominal pain, dysuria, arthralgia or rash. Vaginal discharge.      OBJECTIVE:  Vital Signs Last 24 Hrs  T(C): 37.4, Max: 38.8 (02-12 @ 15:00)  T(F): 99.4, Max: 101.9 (02-12 @ 15:00)  HR: 69 (68 - 77)  BP: 110/50 (102/48 - 122/54)  RR: 18 (16 - 18)  SpO2: 97% (95% - 98%)    PHYSICAL EXAMINATION  General: NAD[+]   Frail  HEENT: AT/NC[+]   NECK: Supple[+]    CVS: RRR[+]  Irregular[]  S1+S2[+]     RESP: Fair air entry bilaterally[+]   Clear sounds[+]   Decreased sounds right base.  GI: Soft[+]  Nondistended[+]   Nontender[+]   Bowel Sounds[+]    : suprapubic tenderness[-]   CVA Tenderness[-]    MS: FROM[+]   Edema[-]  CNS: AAOx3[+]   Grossly intact  INTEG: Skin is Warm[+]   PSYCH: Fair Mood, Affect      MEDICATIONS  (STANDING):  flecainide 50milliGRAM(s) Oral every 12 hours  simvastatin 20milliGRAM(s) Oral at bedtime  ATENolol  Tablet 25milliGRAM(s) Oral daily  cholecalciferol 1000Unit(s) Oral daily  pantoprazole    Tablet 40milliGRAM(s) Oral before breakfast  vancomycin    Solution 125milliGRAM(s) Oral every 6 hours  saccharomyces boulardii 250milliGRAM(s) Oral two times a day      MEDICATIONS  (PRN):  ALPRAZolam 0.25milliGRAM(s) Oral three times a day PRN anxiety  acetaminophen   Tablet 650milliGRAM(s) Oral every 6 hours PRN For Temp greater than 38 C (100.4 F)

## 2017-02-12 NOTE — PROGRESS NOTE ADULT - ASSESSMENT
76 years old female with PMH of HTN, A.Fib, dyslipidemia, lung/breast cancer and abnormal colonoscopy in 11/2016 admitted with GI bleed.    > Cdif colitis, sigmoid thickening, colovaginal fistula - sx improving on Vancomycin.  Continue Abx x 14d. Florastor. Outpatient surgery followup.   > Fever, Noted M spike on recent SPEP, d/w Dr. Trinh.  Pt wishes to pursue outpatient hematology workup.  / GYN evaluation of possible colovaginal fistula.  Unclear as to what kind of workup she has had in past - states has been having weakness, fever and chills for 6 weeks, saw Dr Shelton around 02/02/17 and had tests sent. Mention Tick at home. WIll defer serologies as may have been done by ID. Obtain records from PD  Will send basic autoimmune screening work up including ESR, CRP, OFRTUNATO, RF and obtain rheumatology work up.  Consider PET/CT, Nuclear medicine scans, MAXWELL. WIll discuss with PMD, ID.  > Anemia / Gastrointestinal hemorrhage, unspecified gastrointestinal hemorrhage type. abnormal colonoscopy- hgb now stable, off ASA.  Continue PPI.  Check LDH, Haptoglobin.   > Atrial fibrillation - rate controlled, continue Flecainide and Atenolol.  Holding antiplatelets, no anticoagulation due to bleeding.  > Hyperlipidemia.  Plan: on Zocor.   DVT prophylaxis - VCD  d/w patient in agreement.   Pt wishes to continue evaluation for 6 weeks fever, fatigues - wants to defer any kind of surgical exploration, as per her and her families wishes.    Will review prior work up as well as discuss with other specialists - reviewed hospital records, limited. Given Sunday unable to obtain records from PMD, ID. Will attempt again on Monday 76 years old female with PMH of HTN, A.Fib, dyslipidemia, lung/breast cancer and abnormal colonoscopy in 11/2016 admitted with GI bleed. Diagnosed and being treated with Vancomycin PO for Clostridium difficile diarrhea, which seem to have improved.    Fever for 6 weeks, initially worked up as outpatient with only significant finding on SPEP (M-spike), continues to have lethargy, weakness, fever and chills - without any other significant symptoms, besides diarrhea, vaginal discharge. FUO work- up in progress. 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 Blood cultures are negative, urine culture polymicrobial, stool studies negative. Elevated CRP, ESR and Ferritin. Normal Procalcitonin.     Vaginal discharge - Patient maintain now daily, feculent, though also states that prior work-up unremarkable for fistula.     Anemia, indices consistent with chronic disease, stable - fatigue being only symptom.     Atrial Fibrillation - rate controlled, no anticoagulation secondary to GI Bleeding, anemia.

## 2017-02-12 NOTE — PROGRESS NOTE ADULT - ATTENDING COMMENTS
Discussed with Patient PMD Dr Orozco, ID Dr Shelton in am.  Rheumatology Dr Kleiner consult called  GYN Dr Mata consult called.    Discussed with son in law Dr Ram [phone], whi is concerned about sigmoid and feels that is the source.    Hospital service to reassign patient in am.

## 2017-02-12 NOTE — PROGRESS NOTE ADULT - PROBLEM SELECTOR PLAN 1
Colonoscopy in past incomplete.  May need exploratory laparotomy if work- up negative.  Could consider PET/CT for sigmoid thickening

## 2017-02-13 DIAGNOSIS — I48.2 CHRONIC ATRIAL FIBRILLATION: ICD-10-CM

## 2017-02-13 DIAGNOSIS — E78.5 HYPERLIPIDEMIA, UNSPECIFIED: ICD-10-CM

## 2017-02-13 DIAGNOSIS — Z29.9 ENCOUNTER FOR PROPHYLACTIC MEASURES, UNSPECIFIED: ICD-10-CM

## 2017-02-13 DIAGNOSIS — A04.7 ENTEROCOLITIS DUE TO CLOSTRIDIUM DIFFICILE: ICD-10-CM

## 2017-02-13 DIAGNOSIS — D64.9 ANEMIA, UNSPECIFIED: ICD-10-CM

## 2017-02-13 DIAGNOSIS — K21.9 GASTRO-ESOPHAGEAL REFLUX DISEASE WITHOUT ESOPHAGITIS: ICD-10-CM

## 2017-02-13 DIAGNOSIS — K57.30 DIVERTICULOSIS OF LARGE INTESTINE WITHOUT PERFORATION OR ABSCESS WITHOUT BLEEDING: ICD-10-CM

## 2017-02-13 LAB
ANION GAP SERPL CALC-SCNC: 8 MMOL/L — SIGNIFICANT CHANGE UP (ref 5–17)
BUN SERPL-MCNC: 17 MG/DL — SIGNIFICANT CHANGE UP (ref 8–20)
CALCIUM SERPL-MCNC: 9.7 MG/DL — SIGNIFICANT CHANGE UP (ref 8.6–10.2)
CHLORIDE SERPL-SCNC: 99 MMOL/L — SIGNIFICANT CHANGE UP (ref 98–107)
CO2 SERPL-SCNC: 28 MMOL/L — SIGNIFICANT CHANGE UP (ref 22–29)
CREAT SERPL-MCNC: 0.71 MG/DL — SIGNIFICANT CHANGE UP (ref 0.5–1.3)
CRP SERPL-MCNC: 14 MG/DL — HIGH (ref 0–0.4)
CULTURE RESULTS: SIGNIFICANT CHANGE UP
ERYTHROCYTE [SEDIMENTATION RATE] IN BLOOD: 67 MM/HR — HIGH (ref 0–20)
GLUCOSE SERPL-MCNC: 96 MG/DL — SIGNIFICANT CHANGE UP (ref 70–115)
HCT VFR BLD CALC: 22.5 % — LOW (ref 37–47)
HGB BLD-MCNC: 7.3 G/DL — LOW (ref 12–16)
INTERPRETATION SERPL IFE-IMP: SIGNIFICANT CHANGE UP
LDH SERPL L TO P-CCNC: 121 U/L — SIGNIFICANT CHANGE UP (ref 98–192)
MCHC RBC-ENTMCNC: 29.6 PG — SIGNIFICANT CHANGE UP (ref 27–31)
MCHC RBC-ENTMCNC: 32.4 G/DL — SIGNIFICANT CHANGE UP (ref 32–36)
MCV RBC AUTO: 91.1 FL — SIGNIFICANT CHANGE UP (ref 81–99)
PLATELET # BLD AUTO: 368 K/UL — SIGNIFICANT CHANGE UP (ref 150–400)
POTASSIUM SERPL-MCNC: 4.4 MMOL/L — SIGNIFICANT CHANGE UP (ref 3.5–5.3)
POTASSIUM SERPL-SCNC: 4.4 MMOL/L — SIGNIFICANT CHANGE UP (ref 3.5–5.3)
RBC # BLD: 2.47 M/UL — LOW (ref 4.4–5.2)
RBC # FLD: 14.1 % — SIGNIFICANT CHANGE UP (ref 11–15.6)
SODIUM SERPL-SCNC: 135 MMOL/L — SIGNIFICANT CHANGE UP (ref 135–145)
SPECIMEN SOURCE: SIGNIFICANT CHANGE UP
WBC # BLD: 5.45 K/UL — SIGNIFICANT CHANGE UP (ref 4.8–10.8)
WBC # FLD AUTO: 5.45 K/UL — SIGNIFICANT CHANGE UP (ref 4.8–10.8)

## 2017-02-13 PROCEDURE — 99233 SBSQ HOSP IP/OBS HIGH 50: CPT

## 2017-02-13 RX ADMIN — Medication 650 MILLIGRAM(S): at 15:30

## 2017-02-13 RX ADMIN — Medication 50 MILLIGRAM(S): at 05:52

## 2017-02-13 RX ADMIN — Medication 250 MILLIGRAM(S): at 18:12

## 2017-02-13 RX ADMIN — SIMVASTATIN 20 MILLIGRAM(S): 20 TABLET, FILM COATED ORAL at 22:58

## 2017-02-13 RX ADMIN — Medication 1000 UNIT(S): at 12:12

## 2017-02-13 RX ADMIN — Medication 50 MILLIGRAM(S): at 18:12

## 2017-02-13 RX ADMIN — PANTOPRAZOLE SODIUM 40 MILLIGRAM(S): 20 TABLET, DELAYED RELEASE ORAL at 05:52

## 2017-02-13 RX ADMIN — Medication 125 MILLIGRAM(S): at 23:01

## 2017-02-13 RX ADMIN — Medication 125 MILLIGRAM(S): at 18:12

## 2017-02-13 RX ADMIN — Medication 125 MILLIGRAM(S): at 12:12

## 2017-02-13 RX ADMIN — ATENOLOL 25 MILLIGRAM(S): 25 TABLET ORAL at 05:51

## 2017-02-13 RX ADMIN — Medication 125 MILLIGRAM(S): at 05:52

## 2017-02-13 RX ADMIN — Medication 250 MILLIGRAM(S): at 05:52

## 2017-02-13 NOTE — CHART NOTE - NSCHARTNOTEFT_GEN_A_CORE
Upon Nutritional Assessment by the Registered Dietitian your patient was determined to meet criteria / has evidence of the following diagnosis/diagnoses:          [ ]  Mild Protein Calorie Malnutrition        [ ]  Moderate Protein Calorie Malnutrition        [x] Severe Protein Calorie Malnutrition        [ ] Unspecified Protein Calorie Malnutrition        [ ] Underweight / BMI <19        [ ] Morbid Obesity / BMI > 40      Findings as based on:  •  Comprehensive nutrition assessment and consultation  •  Calorie counts (nutrient intake analysis)  •  Food acceptance and intake status from observations by staff  •  Follow up  •  Patient education  •  Intervention secondary to interdisciplinary rounds  •   concerns      Treatment:    The following diet has been recommended:  --low fiber diet with Ensure 1 can BID      PROVIDER Section:     By signing this assessment you are acknowledging and agree with the diagnosis/diagnoses assigned by the Registered Dietitian    Comments:

## 2017-02-13 NOTE — PROGRESS NOTE ADULT - SUBJECTIVE AND OBJECTIVE BOX
[facility] [place] LENNOX ARELLANO MD FACP   SOTO HAQ MD  Diplomates American Board of Internal Medicine and Infectious Diseases      BRANNON GRANT  [id number]  76y    INTERVAL HPI/OVERNIGHT EVENTS:    FEVER  Vital Signs Last 24 Hrs  T(C): 37.5, Max: 38.8 (02-12 @ 15:00)  T(F): 99.5, Max: 101.9 (02-12 @ 15:00)  HR: 72 (63 - 73)  BP: 120/62 (104/62 - 120/62)  BP(mean): --  RR: 18 (16 - 18)  SpO2: 95% (95% - 97%)    ANTIBIOTICS  vancomycin    Solution 125milliGRAM(s) Oral every 6 hours      Allergies    adhesives (Rash)  Ceclor (Rash)  cephalosporins (Anaphylaxis)  codeine (Hives)  Demerol HCl (Hives)  penicillin (Short breath)  sulfa drugs (Hives)  Sulfac 10% (Anaphylaxis; Short breath)    Intolerances        REVIEW OF SYSTEMS: NO HA/RECTAL Bleeding PAIN    PHYSICAL EXAM:  Vital Signs Last 24 Hrs  T(C): 37.5, Max: 38.8 (02-12 @ 15:00)  T(F): 99.5, Max: 101.9 (02-12 @ 15:00)  HR: 72 (63 - 73)  BP: 120/62 (104/62 - 120/62)  BP(mean): --  RR: 18 (16 - 18)  SpO2: 95% (95% - 97%)      GEN: NAD, pleasant  HEENT: normocephalic and atraumatic. EOMI. STEPHANIE. Moist mucosa. Clear Posterior pharynx.  NECK: Supple. No carotid bruits.  No lymphadenopathy or thyromegaly.  LUNGS: Clear to auscultation.  HEART: Regular rate and rhythm without murmur.  ABDOMEN: Soft, nontender, and nondistended.  Positive bowel sounds.  No hepatosplenomegaly was noted.  EXTREMITIES: Without any cyanosis, clubbing, rash, lesions or edema.  NEUROLOGIC: Cranial nerves II through XII are grossly intact.  MUSCULOSKELETAL:  SKIN: No ulceration or induration present    LABS:                        7.3    5.45  )-----------( 368      ( 13 Feb 2017 07:05 )             22.5       13 Feb 2017 07:09    135    |  99     |  17.0   ----------------------------<  96     4.4     |  28.0   |  0.71     Ca    9.7        13 Feb 2017 07:09          02-13 @ 07:05  hct 22.5 % hgb 7.3 g/dL    02-13 @ 07:05  plat 368 K/uL wbc 5.45 K/uL      02-13-17  creat 0.71 mg/dL gfr 95 mL/min/1.73M2 bun 17.0 mg/dL k 4.4 mmol/L      MICROBIOLOGY:  Culture Results:   Giardia antigen negative performed by rapid immunoassay (non-enzymatic).  (It is recommended that all specimens tested by  an immunochromatographic card test be  confirmed by another method.) (02-10 @ 23:25)  Culture Results:   Testing in progress (02-09 @ 17:39)  Culture Results:   No enteric pathogens isolated.  (Stool culture examined for Salmonella,  Shigella, Campylobacter, Aeromonas, Plesiomonas,  Vibrio, E.coli O157 and Yersinia) (02-08 @ 18:54)  Culture Results:   No growth at 5 days. (02-07 @ 18:26)  Culture Results:   No growth at 5 days. (02-07 @ 18:26)  Culture Results:   50,000 CFU/ml Coag Negative Staphylococcus  25,000 CFU/ml Enterococcus raffinosus (02-07 @ 16:11)        RADIOLOGY & ADDITIONAL STUDIES:      ASSESSMENT AND PLAN:  TRUE FUO - FEVER AND NO SOURCE  CDIFF FROM OUT PT PRIOR ABS AND ASX NOW    ELEV SED AND IGG SPIKE ON IEP  CHRONIC ANEMAI IN PT WITH H/O TB IN PAST    PLAN  ECHO AND BLOOD C/S TO PUT DX ENDOCARDITIS TO REST  R/O PMR/TA - RHEUM EVAL FOR TEMP ART BX  STRONGLY CONSIDER BONE MARROW BX FOR PRIMARY HEME CAUSE OF FEVER AND BM CULT FOR AFB TO R/O REMOTE POSS EXTRAPULM TB  GALLIUM SCAN TO R/O LLQ PROCESS    TIME FOR MORE AGGRESSIVE APPROACH AS ROUTINE TESTS ARE UNREVELAING  D/W PT      MATTHEW ARELLANO MD FACP

## 2017-02-13 NOTE — PROGRESS NOTE ADULT - SUBJECTIVE AND OBJECTIVE BOX
Patient seen and examined.  Had fever again yesterday:  101.9 F.  Still having small squirts of BM's.  During day and at nighttime.  Still alleges to have passage of stool per vagina.  Compliant with Vanco 125 mg po qid.  On low residue diet.  HGB dropped from 8.4 to 7.3.  No overt bleeding.     PAST MEDICAL & SURGICAL HISTORY:  Breast cancer: right lumpectomy RT/CT  Lung cancer: right CT/RT  GERD (gastroesophageal reflux disease)  Sinus problem  Hypothyroid  Hyperparathyroidism  Smoker  Hyperlipidemia  COPD (chronic obstructive pulmonary disease)  Anxiety  Palpitations  Atrial fibrillation: PAF  S/P lumpectomy, right breast: CT/RT  S/P partial lobectomy of lung: right      ROS:  No Heartburn, regurgitation, dysphagia, odynophagia.  No dyspepsia  No abdominal pain.    No Nausea, vomiting.  No Bleeding.  No hematemesis.   No diarrhea.    No hematochesia.  No weight loss, anorexia.  No edema.      MEDICATIONS  (STANDING):  flecainide 50milliGRAM(s) Oral every 12 hours  simvastatin 20milliGRAM(s) Oral at bedtime  ATENolol  Tablet 25milliGRAM(s) Oral daily  cholecalciferol 1000Unit(s) Oral daily  pantoprazole    Tablet 40milliGRAM(s) Oral before breakfast  vancomycin    Solution 125milliGRAM(s) Oral every 6 hours  saccharomyces boulardii 250milliGRAM(s) Oral two times a day    MEDICATIONS  (PRN):  ALPRAZolam 0.25milliGRAM(s) Oral three times a day PRN anxiety  acetaminophen   Tablet 650milliGRAM(s) Oral every 6 hours PRN For Temp greater than 38 C (100.4 F)      Allergies    adhesives (Rash)  Ceclor (Rash)  cephalosporins (Anaphylaxis)  codeine (Hives)  Demerol HCl (Hives)  penicillin (Short breath)  sulfa drugs (Hives)  Sulfac 10% (Anaphylaxis; Short breath)    Intolerances        Vital Signs Last 24 Hrs  T(C): 37.5, Max: 38.8 (02-12 @ 15:00)  T(F): 99.5, Max: 101.9 (02-12 @ 15:00)  HR: 72 (63 - 73)  BP: 120/62 (104/62 - 120/62)  BP(mean): --  RR: 18 (16 - 18)  SpO2: 95% (95% - 97%)    PHYSICAL EXAM:    GENERAL: NAD, well-groomed, well-developed  HEAD:  Atraumatic, Normocephalic  EYES: EOMI, PERRLA, conjunctiva and sclera clear  ENMT: No tonsillar erythema, exudates, or enlargement; Moist mucous membranes, Good dentition, No lesions  NECK: Supple, No JVD, Normal thyroid  CHEST/LUNG: Clear to percussion bilaterally; No rales, rhonchi, wheezing, or rubs  HEART: Regular rate and rhythm; No murmurs, rubs, or gallops  ABDOMEN: Soft, Nontender, Nondistended; Bowel sounds present  EXTREMITIES:  2+ Peripheral Pulses, No clubbing, cyanosis, or edema  LYMPH: No lymphadenopathy noted  SKIN: No rashes or lesions      LABS:                        7.3    5.45  )-----------( 368      ( 13 Feb 2017 07:05 )             22.5     13 Feb 2017 07:09    135    |  99     |  17.0   ----------------------------<  96     4.4     |  28.0   |  0.71     Ca    9.7        13 Feb 2017 07:09               RADIOLOGY & ADDITIONAL STUDIES:  No new studies.  Indium scan ordered by ID.

## 2017-02-13 NOTE — PROGRESS NOTE ADULT - ASSESSMENT
76 years old female with PMH of HTN, A.Fib, dyslipidemia, lung/breast cancer and abnormal colonoscopy in 11/2016 admitted with GI bleed.    > Cdif colitis, sigmoid thickening, colovaginal fistula - sx improving on Vancomycin.  Continue Abx x 14d. Florastor. Outpatient surgery followup.   > Fever, Noted M spike on recent SPEP, d/w Dr. Trinh.  Pt wishes to pursue outpatient hematology workup.  / GYN evaluation of possible colovaginal fistula.  Unclear as to what kind of workup she has had in past - states has been having weakness, fever and chills for 6 weeks, saw Dr Shelton around 02/02/17 and had tests sent. Mention Tick at home. WIll defer serologies as may have been done by ID. Obtain records from PD  Will send basic autoimmune screening work up including ESR, CRP, FORTUNATO, RF and obtain rheumatology work up.  Consider PET/CT, Nuclear medicine scans, MAXWELL. WIll discuss with PMD, ID.  > Anemia / Gastrointestinal hemorrhage, unspecified gastrointestinal hemorrhage type. abnormal colonoscopy- hgb now stable, off ASA.  Continue PPI.  Check LDH, Haptoglobin.   > Atrial fibrillation - rate controlled, continue Flecainide and Atenolol.  Holding antiplatelets, no anticoagulation due to bleeding.  > Hyperlipidemia.  Plan: on Zocor.   DVT prophylaxis - VCD  d/w patient in agreement.   Pt wishes to continue evaluation for 6 weeks fever, fatigues - wants to defer any kind of surgical exploration, as per her and her families wishes.    Will review prior work up as well as discuss with other specialists - reviewed hospital records, limited. Given Sunday unable to obtain records from PMD, ID. Will attempt again on Monday

## 2017-02-13 NOTE — PROGRESS NOTE ADULT - SUBJECTIVE AND OBJECTIVE BOX
HOSPITALIST PROGRESS NOTE    BRANNON GRANT  [id number]  76yFemale    Patient is a 76y old  Female who presents with a chief complaint of Bloody stools. (10 Feb 2017 09:36)    HPI:  76 years old female with PMH of HTN, A.Fib, dyslipidemia, Anxiety and abnormal colonoscopy on 11/2016 presented to the ER for the evaluation of continued bloody stools associated with fatigue and generalized weakness. She was seen in the ER 2 days ago for anemia and received PRBC. She was sent home to follow up with her PCP. Patient reports 1 month of fever for which she was seen by . He ordered blood work but didn't put her on any antibiotic. Patient also reports poor appetites, loss of weight and constipation. Patient also has a h/o colovaginal fistula for which she was seen by   but no surgical intervention was offered (per patient). Denies any abdominal pain, nausea, vomiting, SOB, dizziness or lightheadedness. (07 Feb 2017 16:29)    PAST MEDICAL & SURGICAL HISTORY:  Breast cancer: right lumpectomy RT/CT  Lung cancer: right CT/RT  GERD (gastroesophageal reflux disease)  Sinus problem  Hypothyroid  Hyperparathyroidism  Smoker  Hyperlipidemia  COPD (chronic obstructive pulmonary disease)  Anxiety  Palpitations  Atrial fibrillation: PAF  S/P lumpectomy, right breast: CT/RT  S/P partial lobectomy of lung: right    SUBJECTIVE: Chart reviewed since last visit. Patient seen and examined at bedside. Still feels fatigued, having chills and fever, though lower grade. Denies any other symptoms such as headache, dizziness chest pain, cough, nausea, vomiting, abdominal pain, arthralgia Unable to tell if having diarrhea or vaginal discharge - but states IS having Vaginal discharge - infrequent at first, now daily      OBJECTIVE:  Vital Signs Last 24 Hrs  T(C): 37.9, Max: 37.9 (02-13 @ 16:07)  T(F): 100.3, Max: 100.3 (02-13 @ 16:07)  HR: 72 (63 - 73)  BP: 120/60 (104/62 - 120/62)  RR: 18 (16 - 18)  SpO2: 95% (95% - 97%)    PHYSICAL EXAMINATION  General: NAD[+]   Frail, fatigued  HEENT: AT/NC[+]   NECK: Supple[+]    CVS: RRR[+]  Irregular[]  S1+S2[+]     RESP: Fair air entry bilaterally[+]   Clear sounds[+]   Decreased sounds right base.  GI: Soft[+]  Nondistended[+]   Nontender[+]   Bowel Sounds[+]    : suprapubic tenderness[-]   CVA Tenderness[-]    MS: FROM[+]   Edema[-]  CNS: AAOx3[+]   Grossly intact  INTEG: Skin is Warm[+]   PSYCH: Fair Mood, Affect      I&O's Summary  I & Os for 24h ending 13 Feb 2017 07:00  =============================================  IN: 120 ml / OUT: 0 ml / NET: 120 ml    I & Os for current day (as of 13 Feb 2017 17:29)  =============================================  IN: 720 ml / OUT: 0 ml / NET: 720 ml                          7.3    5.45  )-----------( 368      ( 13 Feb 2017 07:05 )             22.5       13 Feb 2017 07:09    135    |  99     |  17.0   ----------------------------<  96     4.4     |  28.0   |  0.71     Ca    9.7        13 Feb 2017 07:09      Sedimentation Rate, Erythrocyte: 67 mm/hr (02.13.17 @ 07:05)  C-Reactive Protein, Serum: 14.00 mg/dL (02.13.17 @ 07:09)  Lactate Dehydrogenase, Serum: 121 U/L (02.13.17 @ 07:09)          MEDICATIONS  (STANDING):  flecainide 50milliGRAM(s) Oral every 12 hours  simvastatin 20milliGRAM(s) Oral at bedtime  ATENolol  Tablet 25milliGRAM(s) Oral daily  cholecalciferol 1000Unit(s) Oral daily  pantoprazole    Tablet 40milliGRAM(s) Oral before breakfast  vancomycin    Solution 125milliGRAM(s) Oral every 6 hours  saccharomyces boulardii 250milliGRAM(s) Oral two times a day      MEDICATIONS  (PRN):  ALPRAZolam 0.25milliGRAM(s) Oral three times a day PRN anxiety  acetaminophen   Tablet 650milliGRAM(s) Oral every 6 hours PRN For Temp greater than 38 C (100.4 F)

## 2017-02-14 LAB
CA TITR SERPL: SIGNIFICANT CHANGE UP
HAPTOGLOB SERPL-MCNC: 530 MG/DL — HIGH (ref 34–200)
RHEUMATOID FACT SERPL-ACNC: 9.7 IU/ML — SIGNIFICANT CHANGE UP (ref 0–13.9)

## 2017-02-14 PROCEDURE — 99232 SBSQ HOSP IP/OBS MODERATE 35: CPT

## 2017-02-14 PROCEDURE — 99233 SBSQ HOSP IP/OBS HIGH 50: CPT

## 2017-02-14 RX ORDER — FLUTICASONE PROPIONATE 50 MCG
1 SPRAY, SUSPENSION NASAL
Qty: 0 | Refills: 0 | Status: DISCONTINUED | OUTPATIENT
Start: 2017-02-14 | End: 2017-02-19

## 2017-02-14 RX ADMIN — Medication 250 MILLIGRAM(S): at 06:18

## 2017-02-14 RX ADMIN — Medication 1000 UNIT(S): at 11:24

## 2017-02-14 RX ADMIN — Medication 125 MILLIGRAM(S): at 06:18

## 2017-02-14 RX ADMIN — Medication 125 MILLIGRAM(S): at 17:52

## 2017-02-14 RX ADMIN — Medication 250 MILLIGRAM(S): at 17:52

## 2017-02-14 RX ADMIN — PANTOPRAZOLE SODIUM 40 MILLIGRAM(S): 20 TABLET, DELAYED RELEASE ORAL at 06:18

## 2017-02-14 RX ADMIN — ATENOLOL 25 MILLIGRAM(S): 25 TABLET ORAL at 06:18

## 2017-02-14 RX ADMIN — Medication 50 MILLIGRAM(S): at 06:18

## 2017-02-14 RX ADMIN — Medication 125 MILLIGRAM(S): at 11:24

## 2017-02-14 RX ADMIN — Medication 50 MILLIGRAM(S): at 17:52

## 2017-02-14 NOTE — CONSULT NOTE ADULT - SUBJECTIVE AND OBJECTIVE BOX
76 years old female with PMH of HTN, A.Fib, dyslipidemia, Anxiety and abnormal colonoscopy on 11/2016 presented to the ER for the evaluation of continued bloody stools associated with fatigue and generalized weakness. She was seen in the ER 2 days ago for anemia and received PRBC. She was sent home to follow up with her PCP. Patient reports 1 month of fever for which she was seen by . He ordered blood work but didn't put her on any antibiotic. Patient also reports poor appetites, loss of weight and constipation. Patient also has a h/o colovaginal fistula for which she was seen by   but no surgical intervention was offered (per patient). Denies any abdominal pain, nausea, vomiting, SOB, dizziness or lightheadedness. (07 Feb 2017 16:29)    PAST MEDICAL & SURGICAL HISTORY:  Breast cancer: right lumpectomy RT/CT  Lung cancer: right CT/RT  GERD (gastroesophageal reflux disease)  Sinus problem  Hypothyroid  Hyperparathyroidism  Smoker  Hyperlipidemia  COPD (chronic obstructive pulmonary disease)  Anxiety  Palpitations  Atrial fibrillation: PAF  S/P lumpectomy, right breast: CT/RT  S/P partial lobectomy of lung: right    Still feels fatigued, having chills and fever, though lower grade. Denies any other symptoms such as headache, dizziness chest pain, cough, nausea, vomiting, abdominal pain, arthralgia. Pt having diarrhea and is being treated for C-diff.    Allergies    adhesives (Rash)  Ceclor (Rash)  cephalosporins (Anaphylaxis)  codeine (Hives)  Demerol HCl (Hives)  penicillin (Short breath)  sulfa drugs (Hives)  Sulfac 10% (Anaphylaxis; Short breath)      REVIEW OF SYSTEMS:    CONSTITUTIONAL:  As per HPI.    HEENT:  Eyes:  No diplopia or blurred vision. ENT:  No earache, sore throat or runny nose.    CARDIOVASCULAR:  No pressure, squeezing, strangling, tightness, heaviness or aching about the chest, neck, axilla or epigastrium.    RESPIRATORY:  No cough, shortness of breath, PND or orthopnea.    GASTROINTESTINAL:  No nausea, vomiting or diarrhea.    GENITOURINARY:  No dysuria, frequency or urgency.    MUSCULOSKELETAL:  As per HPI.    SKIN:  No change in skin, hair or nails.    NEUROLOGIC:  No paresthesias, fasciculations, seizures or weakness.    PSYCHIATRIC:  No disorder of thought or mood.    ENDOCRINE:  No heat or cold intolerance, polyuria or polydipsia.    HEMATOLOGICAL:  No easy bruising or bleeding.     FEVER tmax >100  Vital Signs Last 24 Hrs  T(C): 37.5, Max: 38.8 (02-12 @ 15:00)  T(F): 99.5, Max: 101.9 (02-12 @ 15:00)  HR: 72 (63 - 73)  BP: 120/62 (104/62 - 120/62)  BP(mean): --  RR: 18 (16 - 18)  SpO2: 95% (95% - 97%)    GEN: NAD, pleasant  HEENT: normocephalic and atraumatic. EOMI. STEPHANIE. Moist mucosa. Clear Posterior pharynx.  NECK: Supple. No carotid bruits.  No lymphadenopathy or thyromegaly.  LUNGS: Clear to auscultation.  HEART: Regular rate and rhythm without murmur.  ABDOMEN: Soft, nontender, and nondistended.  Positive bowel sounds.  No hepatosplenomegaly was noted.  EXTREMITIES: Without any cyanosis, clubbing, rash, lesions or edema.  NEUROLOGIC: Cranial nerves II through XII are grossly intact.  MUSCULOSKELETAL:  SKIN: No ulceration or induration present    LABS:                        7.3    5.45  )-----------( 368      ( 13 Feb 2017 07:05 )             22.5       13 Feb 2017 07:09    135    |  99     |  17.0   ----------------------------<  96     4.4     |  28.0   |  0.71     Ca    9.7        13 Feb 2017 07:09          02-13 @ 07:05  hct 22.5 % hgb 7.3 g/dL    02-13 @ 07:05  plat 368 K/uL wbc 5.45 K/uL      02-13-17  creat 0.71 mg/dL gfr 95 mL/min/1.73M2 bun 17.0 mg/dL k 4.4 mmol/L      MICROBIOLOGY:  Culture Results:   Giardia antigen negative performed by rapid immunoassay (non-enzymatic).  (It is recommended that all specimens tested by  an immunochromatographic card test be  confirmed by another method.) (02-10 @ 23:25)  Culture Results:   Testing in progress (02-09 @ 17:39)  Culture Results:   No enteric pathogens isolated.  (Stool culture examined for Salmonella,  Shigella, Campylobacter, Aeromonas, Plesiomonas,  Vibrio, E.coli O157 and Yersinia) (02-08 @ 18:54)  Culture Results:   No growth at 5 days. (02-07 @ 18:26)  Culture Results:   No growth at 5 days. (02-07 @ 18:26)  Culture Results:   50,000 CFU/ml Coag Negative Staphylococcus  25,000 CFU/ml Enterococcus raffinosus (02-07 @ 16:11)

## 2017-02-14 NOTE — PROGRESS NOTE ADULT - SUBJECTIVE AND OBJECTIVE BOX
[facility]  MD Mayelin Weathers MD  [phone]  Answering Amanda : [phone]    [facility]ANN[id number]    INTERVAL HPI/OVERNIGHT EVENTS:    Vital Signs Last 24 Hrs  T(C): 37.6, Max: 37.8 (02-14 @ 16:30)  T(F): 99.6, Max: 100.1 (02-14 @ 16:30)  HR: 70 (65 - 75)  BP: 124/56 (96/52 - 128/58)  BP(mean): --  RR: 18 (18 - 18)  SpO2: 97% (95% - 99%)  ANTIBIOTICS  vancomycin    Solution 125milliGRAM(s) Oral every 6 hours      Allergies    adhesives (Rash)  Ceclor (Rash)  cephalosporins (Anaphylaxis)  codeine (Hives)  Demerol HCl (Hives)  penicillin (Short breath)  sulfa drugs (Hives)  Sulfac 10% (Anaphylaxis; Short breath)    Intolerances        REVIEW OF SYSTEMS:    CONSTITUTIONAL:  As per HPI.    HEENT:  Eyes:  No diplopia or blurred vision. ENT:  No earache, sore throat or runny nose.    CARDIOVASCULAR:  No pressure, squeezing, strangling, tightness, heaviness or aching about the chest, neck, axilla or epigastrium.    RESPIRATORY:  No cough, shortness of breath, PND or orthopnea.    GASTROINTESTINAL:  No nausea, vomiting or diarrhea.    GENITOURINARY:  No dysuria, frequency or urgency.    MUSCULOSKELETAL:  As per HPI.    SKIN:  No change in skin, hair or nails.    NEUROLOGIC:  CNI,MOTOR WNL, SENSORY WNL.    ENDOCRINE:  No heat or cold intolerance, polyuria or polydipsia.    HEMATOLOGICAL:  No easy bruising or bleeding.           PHYSICAL EXAMINATION:    GENERAL: The patient is a well-developed, well-nourished _____in no apparent distress. ___ is alert and oriented x3.    VITAL SIGNS:     HEENT: Head is normocephalic and atraumatic. Extraocular muscles are intact. Pupils are equal, round, and reactive to light and accommodation. Nares appeared normal. Mouth is well hydrated and without lesions. Mucous membranes are moist. Posterior pharynx clear of any exudate or lesions.    NECK: Supple. No carotid bruits.  No lymphadenopathy or thyromegaly.    LUNGS: Clear to auscultation.    HEART: Regular rate and rhythm without murmur.    ABDOMEN: Soft, nontender, and nondistended.  Positive bowel sounds.  No hepatosplenomegaly was noted.    EXTREMITIES: Without any cyanosis, clubbing, rash, lesions or edema.    NEUROLOGIC: Cranial nerves II through XII are grossly intact.    PSYCHIATRIC: Flat affect, but denies suicidal or homicidal ideations.  SKIN: No ulceration or induration present.      LABS:                        7.3    5.45  )-----------( 368      ( 13 Feb 2017 07:05 )             22.5     13 Feb 2017 07:09    135    |  99     |  17.0   ----------------------------<  96     4.4     |  28.0   |  0.71     Ca    9.7        13 Feb 2017 07:09    RADIOLOGY & ADDITIONAL STUDIES:        ASSESSMENT:  Anemia of chronic disease   low grade fever  ? MGUS       PLAN:  Serum protein studies   Bone marrow exam   Patient refusing bone marrow exam    monitor CBC      Mony Layne MD

## 2017-02-14 NOTE — PROGRESS NOTE ADULT - SUBJECTIVE AND OBJECTIVE BOX
Pt seen and examined She states that she had an uneventful evening w/o a BM or fever. To start Indium scan today.    MEDICATIONS:  MEDICATIONS  (STANDING):  flecainide 50milliGRAM(s) Oral every 12 hours  simvastatin 20milliGRAM(s) Oral at bedtime  ATENolol  Tablet 25milliGRAM(s) Oral daily  cholecalciferol 1000Unit(s) Oral daily  pantoprazole    Tablet 40milliGRAM(s) Oral before breakfast  vancomycin    Solution 125milliGRAM(s) Oral every 6 hours  saccharomyces boulardii 250milliGRAM(s) Oral two times a day    MEDICATIONS  (PRN):  ALPRAZolam 0.25milliGRAM(s) Oral three times a day PRN anxiety  acetaminophen   Tablet 650milliGRAM(s) Oral every 6 hours PRN For Temp greater than 38 C (100.4 F)      Allergies    adhesives (Rash)  Ceclor (Rash)  cephalosporins (Anaphylaxis)  codeine (Hives)  Demerol HCl (Hives)  penicillin (Short breath)  sulfa drugs (Hives)  Sulfac 10% (Anaphylaxis; Short breath)    Intolerances        Vital Signs Last 24 Hrs  T(C): 37.5, Max: 37.9 (02-13 @ 16:07)  T(F): 99.5, Max: 100.3 (02-13 @ 16:07)  HR: 75 (65 - 75)  BP: 128/58 (98/52 - 128/58)  BP(mean): --  RR: 18 (18 - 18)  SpO2: 95% (95% - 99%)  I & Os for 24h ending 02-13 @ 07:00  =============================================  IN: 120 ml / OUT: 0 ml / NET: 120 ml    I & Os for current day (as of 02-14 @ 06:53)  =============================================  IN: 720 ml / OUT: 0 ml / NET: 720 ml      PHYSICAL EXAM:    General: Well developed; well nourished; in no acute distress  HEENT: MMM, conjunctiva and sclera clear  Lungs: clear to auscultation and percussion.  Cor: RRR S1, S2 only w/o mrg or clicks  Gastrointestinal: Abdomen: Soft non-tender non-distended; Normal bowel sounds; No hepatosplenomegaly  Extremities: no cyanosis, clubbing or edema.  Skin: Warm and dry. No obvious rash  Neuro: Pt. a + o x 3    LABS:      CBC Full  -  ( 13 Feb 2017 07:05 )  WBC Count : 5.45 K/uL  Hemoglobin : 7.3 g/dL  Hematocrit : 22.5 %  Platelet Count - Automated : 368 K/uL  Mean Cell Volume : 91.1 fl  Mean Cell Hemoglobin : 29.6 pg  Mean Cell Hemoglobin Concentration : 32.4 g/dL  Auto Neutrophil # : x  Auto Lymphocyte # : x  Auto Monocyte # : x  Auto Eosinophil # : x  Auto Basophil # : x  Auto Neutrophil % : x  Auto Lymphocyte % : x  Auto Monocyte % : x  Auto Eosinophil % : x  Auto Basophil % : x    13 Feb 2017 07:09    135    |  99     |  17.0   ----------------------------<  96     4.4     |  28.0   |  0.71     Ca    9.7        13 Feb 2017 07:09                        RADIOLOGY & ADDITIONAL STUDIES (The following images were personally reviewed):

## 2017-02-14 NOTE — PROGRESS NOTE ADULT - SUBJECTIVE AND OBJECTIVE BOX
[facility] [place] LENNOX ARELLANO MD FACP   SOTO HAQ MD  Diplomates American Board of Internal Medicine and Infectious Diseases      BRANNON GRANT  [id number]  76y    INTERVAL HPI/OVERNIGHT EVENTS:    FEVER tmax >100  Vital Signs Last 24 Hrs  T(C): 37.5, Max: 38.8 (02-12 @ 15:00)  T(F): 99.5, Max: 101.9 (02-12 @ 15:00)  HR: 72 (63 - 73)  BP: 120/62 (104/62 - 120/62)  BP(mean): --  RR: 18 (16 - 18)  SpO2: 95% (95% - 97%)    ANTIBIOTICS  vancomycin    Solution 125milliGRAM(s) Oral every 6 hours      Allergies    adhesives (Rash)  Ceclor (Rash)  cephalosporins (Anaphylaxis)  codeine (Hives)  Demerol HCl (Hives)  penicillin (Short breath)  sulfa drugs (Hives)  Sulfac 10% (Anaphylaxis; Short breath)    Intolerances        REVIEW OF SYSTEMS: FEVER AND COUGH  NO DIARRHEA    PHYSICAL EXAM:  Vital Signs Last 24 Hrs  T(C): 37.5, Max: 38.8 (02-12 @ 15:00)  T(F): 99.5, Max: 101.9 (02-12 @ 15:00)  HR: 72 (63 - 73)  BP: 120/62 (104/62 - 120/62)  BP(mean): --  RR: 18 (16 - 18)  SpO2: 95% (95% - 97%)      GEN: NAD, pleasant  HEENT: normocephalic and atraumatic. EOMI. STEPHANIE. Moist mucosa. Clear Posterior pharynx.  NECK: Supple. No carotid bruits.  No lymphadenopathy or thyromegaly.  LUNGS: Clear to auscultation.  HEART: Regular rate and rhythm without murmur.  ABDOMEN: Soft, nontender, and nondistended.  Positive bowel sounds.  No hepatosplenomegaly was noted.  EXTREMITIES: Without any cyanosis, clubbing, rash, lesions or edema.  NEUROLOGIC: Cranial nerves II through XII are grossly intact.  MUSCULOSKELETAL:  SKIN: No ulceration or induration present    LABS:                        7.3    5.45  )-----------( 368      ( 13 Feb 2017 07:05 )             22.5       13 Feb 2017 07:09    135    |  99     |  17.0   ----------------------------<  96     4.4     |  28.0   |  0.71     Ca    9.7        13 Feb 2017 07:09          02-13 @ 07:05  hct 22.5 % hgb 7.3 g/dL    02-13 @ 07:05  plat 368 K/uL wbc 5.45 K/uL      02-13-17  creat 0.71 mg/dL gfr 95 mL/min/1.73M2 bun 17.0 mg/dL k 4.4 mmol/L      MICROBIOLOGY:  Culture Results:   Giardia antigen negative performed by rapid immunoassay (non-enzymatic).  (It is recommended that all specimens tested by  an immunochromatographic card test be  confirmed by another method.) (02-10 @ 23:25)  Culture Results:   Testing in progress (02-09 @ 17:39)  Culture Results:   No enteric pathogens isolated.  (Stool culture examined for Salmonella,  Shigella, Campylobacter, Aeromonas, Plesiomonas,  Vibrio, E.coli O157 and Yersinia) (02-08 @ 18:54)  Culture Results:   No growth at 5 days. (02-07 @ 18:26)  Culture Results:   No growth at 5 days. (02-07 @ 18:26)  Culture Results:   50,000 CFU/ml Coag Negative Staphylococcus  25,000 CFU/ml Enterococcus raffinosus (02-07 @ 16:11)        RADIOLOGY & ADDITIONAL STUDIES:      ASSESSMENT AND PLAN:  TRUE FUO - FEVER AND NO SOURCE  CDIFF FROM OUT PT PRIOR ABS AND ASX NOW    ELEV SED AND IGG SPIKE ON IEP  CHRONIC ANEMAI IN PT WITH H/O TB IN PAST    PLAN  ECHO AND BLOOD C/S TO PUT DX ENDOCARDITIS TO REST  R/O PMR/TA - RHEUM EVAL FOR TEMP ART BX  STRONGLY CONSIDER BONE MARROW BX FOR PRIMARY HEME CAUSE OF FEVER AND BM CULT FOR AFB TO R/O REMOTE POSS EXTRAPULM TB  GALLIUM SCAN TO R/O LLQ PROCESS    SED RATE AND CRP ELEVATED  DIFF DX TRUE FUO  STRONGLY SUGGEST TEMPORAL ARTERY BX - DR. SHAFFER CAN DO  RHEUM EVAL DR. KLEINER  INDIUYM SCAN PENDING    AGAIN STRONGLY SUGGES BM BX AND CULT TO R/O PRIMARY BM DISORDER AND TO R/O EXTRAPULM TB  D/W PT      MATTHEW ARELLANO MD FACP

## 2017-02-14 NOTE — PROGRESS NOTE ADULT - ASSESSMENT
76 years old female with PMH of HTN, A.Fib, dyslipidemia, lung/breast cancer and abnormal colonoscopy in 11/2016 admitted with GI bleed.    > Cdif colitis, sigmoid thickening, colovaginal fistula -  improving on Vancomycin.  Continue Abx x 14d. Florastor.   > Fever, Noted M spike on recent SPEP,  outpatient hematology workup.  / GYN evaluation of possible colovaginal fistula.  Will send basic autoimmune screening work up including ESR, CRP, FORTUNATO, RF and obtain rheumatology work up.    > Anemia / Gastrointestinal hemorrhage, unspecified gastrointestinal hemorrhage type. abnormal colonoscopy- hgb now stable, off ASA.  Continue PPI.  Check LDH, Haptoglobin.   > Atrial fibrillation : rate controlled, continue Flecainide and Atenolol.  Holding antiplatelets, no anticoagulation due to bleeding.  > Hyperlipidemia.  :on Zocor.   DVT prophylaxis - VCD  d/w patient plan for rheumatology Dr sahu to consult ,given 6 weeks fever fatigues - wants to defer any kind of surgical exploration, she refused temporal artery biopsy and doesn't want bone marrow biopsy at present

## 2017-02-14 NOTE — PROGRESS NOTE ADULT - PROBLEM SELECTOR PLAN 2
No BM's overnight. Doubt possible recto-vaginal fistula responsible for her FUO. Await Indium scan results.

## 2017-02-14 NOTE — CONSULT NOTE ADULT - ASSESSMENT
77 y/o female with fever of unknown origin.
Colonic Stricture / C Dif Colitis  Iv Hydration  Iv Abx  F/U Stool Studies  Will continue to follow
Given recent normal cystoscopy and no signs of fistulous tracts on CT this is likely not the source of her fevers.    She asked not to be followed by the urologist while  in the hospital therefore I will sign off.
This 76 y.o. woman with multiple medical issues here for workup of anemia and rectal bleeding, also with fevers of unknown origin.   BLOOD cultures in process for this admission.

## 2017-02-14 NOTE — CONSULT NOTE ADULT - PROBLEM SELECTOR PROBLEM 1
Anemia
Fever due to unspecified condition
Fever due to unspecified condition
R/O Colovaginal fistula

## 2017-02-14 NOTE — PROGRESS NOTE ADULT - SUBJECTIVE AND OBJECTIVE BOX
BRANNON GRANT Patient is a 76y old  Female who presents with a chief complaint of Bloody stools. (10 Feb 2017 09:36)     HPI:  76 years old female with PMH of HTN, A.Fib, dyslipidemia, Anxiety and abnormal colonoscopy on 11/2016 presented to the ER for the evaluation of continued bloody stools associated with fatigue and generalized weakness. She was seen in the ER 2 days ago for anemia and received PRBC. She was sent home to follow up with her PCP. Patient reports 1 month of fever for which she was seen by . He ordered blood work but didn't put her on any antibiotic. Patient also reports poor appetites, loss of weight and constipation. Patient also has a h/o colovaginal fistula for which she was seen by   but no surgical intervention was offered (per patient). Denies any abdominal pain, nausea, vomiting, SOB, dizziness or lightheadedness. (07 Feb 2017 16:29)    The patient was seen and evaluated FUO, refusing Temporal artery biopsy, rheumatology Dr Montoya called   The patient is in no acute distress.  Denied any fever chest pain, palpitations, shortness of breath, abdominal pain, fever, dysuria, cough, edema       I&O's Summary  I & Os for 24h ending 14 Feb 2017 07:00  =============================================  IN: 720 ml / OUT: 0 ml / NET: 720 ml    I & Os for current day (as of 14 Feb 2017 12:32)  =============================================  IN: 240 ml / OUT: 0 ml / NET: 240 ml    Allergies    adhesives (Rash)  Ceclor (Rash)  cephalosporins (Anaphylaxis)  codeine (Hives)  Demerol HCl (Hives)  penicillin (Short breath)  sulfa drugs (Hives)  Sulfac 10% (Anaphylaxis; Short breath)    Intolerances      HEALTH ISSUES - PROBLEM Dx:  Prophylactic measure: Prophylactic measure  Dyslipidemia: Dyslipidemia  Gastroesophageal reflux disease without esophagitis: Gastroesophageal reflux disease without esophagitis  Chronic atrial fibrillation: Chronic atrial fibrillation  Anemia, unspecified type: Anemia, unspecified type  Clostridium difficile diarrhea: Clostridium difficile diarrhea  Diverticulosis of large intestine without perforation or abscess: Diverticulosis of large intestine without perforation or abscess  Clostridium difficile colitis: Clostridium difficile colitis  Fever due to unspecified condition: Fever due to unspecified condition  Anemia: Anemia  Colovaginal fistula: Colovaginal fistula  Fever: Fever  Hyperlipidemia: Hyperlipidemia  Hypothyroid: Hypothyroid  Atrial fibrillation: Atrial fibrillation  Gastrointestinal hemorrhage, unspecified gastrointestinal hemorrhage type: Gastrointestinal hemorrhage, unspecified gastrointestinal hemorrhage type        PAST MEDICAL & SURGICAL HISTORY:  Breast cancer: right lumpectomy RT/CT  Lung cancer: right CT/RT  GERD (gastroesophageal reflux disease)  Sinus problem  Hypothyroid  Hyperparathyroidism  Smoker  Hyperlipidemia  COPD (chronic obstructive pulmonary disease)  Anxiety  Palpitations  Atrial fibrillation: PAF  S/P lumpectomy, right breast: CT/RT  S/P partial lobectomy of lung: right          Vital Signs Last 24 Hrs  T(C): 37.5, Max: 37.9 (02-13 @ 16:07)  T(F): 99.5, Max: 100.3 (02-13 @ 16:07)  HR: 69 (65 - 75)  BP: 96/52 (96/52 - 128/58)  BP(mean): --  RR: 18 (18 - 18)  SpO2: 99% (95% - 99%)T(C): 37.5, Max: 37.9 (02-13 @ 16:07)  HR: 69 (65 - 75)  BP: 96/52 (96/52 - 128/58)  RR: 18 (18 - 18)  SpO2: 99% (95% - 99%)  Wt(kg): --    PHYSICAL EXAM:    GENERAL: NAD,  HEAD:  Atraumatic, Normocephalic  EYES: EOMI, PERRLA, conjunctiva and sclera clear  NECK: Supple, No JVD, Normal thyroid  NERVOUS SYSTEM:  Alert & Oriented X3,  Moves upper and lower extremities;  CHEST/LUNG: Clear to auscultation bilaterally; No rales, rhonchi, wheezing,   HEART: Regular rate and rhythm; No murmurs,   ABDOMEN: Soft, Nontender, Nondistended; Bowel sounds present  EXTREMITIES:  Peripheral Pulses, No  cyanosis, or edema  SKIN: No rashes or lesions    flecainide 50milliGRAM(s) Oral every 12 hours  simvastatin 20milliGRAM(s) Oral at bedtime  ALPRAZolam 0.25milliGRAM(s) Oral three times a day PRN  ATENolol  Tablet 25milliGRAM(s) Oral daily  cholecalciferol 1000Unit(s) Oral daily  pantoprazole    Tablet 40milliGRAM(s) Oral before breakfast  acetaminophen   Tablet 650milliGRAM(s) Oral every 6 hours PRN  vancomycin    Solution 125milliGRAM(s) Oral every 6 hours  saccharomyces boulardii 250milliGRAM(s) Oral two times a day      LABS:                          7.3    5.45  )-----------( 368      ( 13 Feb 2017 07:05 )             22.5     13 Feb 2017 07:09    135    |  99     |  17.0   ----------------------------<  96     4.4     |  28.0   |  0.71     Ca    9.7        13 Feb 2017 07:09                CAPILLARY BLOOD GLUCOSE      RADIOLOGY & ADDITIONAL TESTS:      Consultant notes reviewed    Case discussed with consultant/provider/ family /patient

## 2017-02-14 NOTE — CONSULT NOTE ADULT - PROBLEM SELECTOR RECOMMENDATION 9
Patient denying rectal bleeding to me. However, pt with fevers at home. Virtual colonoscopy showing  persistant narrowing in sigmoid. Having BM's so no obstructed.      Would do Ct with oral contrast only. Stool studies to be collected if pt has diarrhea  Please call surgery ( Dr Mckenzie) to revaluate - ?diverticular stricutre vs mass
- I have explained the risks and benefits of temporal artery biopsy to the patient.  - At this time patient is not agreeable to have temporal artery done.  - Please re-consult if patient changes her mind  - Thank you
Likely no colovesical fistula    repeat UC to assure its neative    No acute urologic issues    SHe asked not to be seen by the urologist again during this admission.  GRAY Stearns as an outpatient
- recent blood work with SPEP showing M-spike, elevated ESR and CRP   - consider HEMATOLOGY/ ONCOLOGY evaluation for workup of myeloma and other neoplasms  - will check procalcitonin results

## 2017-02-15 LAB
ANA PAT FLD IF-IMP: ABNORMAL
ANA TITR SER: ABNORMAL
ANION GAP SERPL CALC-SCNC: 11 MMOL/L — SIGNIFICANT CHANGE UP (ref 5–17)
BASOPHILS # BLD AUTO: 0 K/UL — SIGNIFICANT CHANGE UP (ref 0–0.2)
BASOPHILS NFR BLD AUTO: 0.2 % — SIGNIFICANT CHANGE UP (ref 0–2)
BUN SERPL-MCNC: 14 MG/DL — SIGNIFICANT CHANGE UP (ref 8–20)
CALCIUM SERPL-MCNC: 10.4 MG/DL — HIGH (ref 8.6–10.2)
CHLORIDE SERPL-SCNC: 100 MMOL/L — SIGNIFICANT CHANGE UP (ref 98–107)
CO2 SERPL-SCNC: 28 MMOL/L — SIGNIFICANT CHANGE UP (ref 22–29)
CREAT SERPL-MCNC: 0.76 MG/DL — SIGNIFICANT CHANGE UP (ref 0.5–1.3)
EOSINOPHIL # BLD AUTO: 0.2 K/UL — SIGNIFICANT CHANGE UP (ref 0–0.5)
EOSINOPHIL NFR BLD AUTO: 3.5 % — SIGNIFICANT CHANGE UP (ref 0–6)
GLUCOSE SERPL-MCNC: 91 MG/DL — SIGNIFICANT CHANGE UP (ref 70–115)
HCT VFR BLD CALC: 22.3 % — LOW (ref 37–47)
HGB BLD-MCNC: 7.1 G/DL — LOW (ref 12–16)
LYMPHOCYTES # BLD AUTO: 1 K/UL — SIGNIFICANT CHANGE UP (ref 1–4.8)
LYMPHOCYTES # BLD AUTO: 22.5 % — SIGNIFICANT CHANGE UP (ref 20–55)
MAGNESIUM SERPL-MCNC: 2.3 MG/DL — SIGNIFICANT CHANGE UP (ref 1.8–2.5)
MCHC RBC-ENTMCNC: 29.1 PG — SIGNIFICANT CHANGE UP (ref 27–31)
MCHC RBC-ENTMCNC: 31.8 G/DL — LOW (ref 32–36)
MCV RBC AUTO: 91.4 FL — SIGNIFICANT CHANGE UP (ref 81–99)
MONOCYTES # BLD AUTO: 0.6 K/UL — SIGNIFICANT CHANGE UP (ref 0–0.8)
MONOCYTES NFR BLD AUTO: 14.2 % — HIGH (ref 3–10)
NEUTROPHILS # BLD AUTO: 2.7 K/UL — SIGNIFICANT CHANGE UP (ref 1.8–8)
NEUTROPHILS NFR BLD AUTO: 59.2 % — SIGNIFICANT CHANGE UP (ref 37–73)
PHOSPHATE SERPL-MCNC: 3 MG/DL — SIGNIFICANT CHANGE UP (ref 2.4–4.7)
PLATELET # BLD AUTO: 371 K/UL — SIGNIFICANT CHANGE UP (ref 150–400)
POTASSIUM SERPL-MCNC: 4.5 MMOL/L — SIGNIFICANT CHANGE UP (ref 3.5–5.3)
POTASSIUM SERPL-SCNC: 4.5 MMOL/L — SIGNIFICANT CHANGE UP (ref 3.5–5.3)
RBC # BLD: 2.44 M/UL — LOW (ref 4.4–5.2)
RBC # FLD: 14.4 % — SIGNIFICANT CHANGE UP (ref 11–15.6)
SODIUM SERPL-SCNC: 139 MMOL/L — SIGNIFICANT CHANGE UP (ref 135–145)
WBC # BLD: 4.6 K/UL — LOW (ref 4.8–10.8)
WBC # FLD AUTO: 4.6 K/UL — LOW (ref 4.8–10.8)

## 2017-02-15 PROCEDURE — 78806: CPT | Mod: 26

## 2017-02-15 PROCEDURE — 99233 SBSQ HOSP IP/OBS HIGH 50: CPT

## 2017-02-15 RX ADMIN — SIMVASTATIN 20 MILLIGRAM(S): 20 TABLET, FILM COATED ORAL at 00:20

## 2017-02-15 RX ADMIN — Medication 50 MILLIGRAM(S): at 18:44

## 2017-02-15 RX ADMIN — Medication 125 MILLIGRAM(S): at 13:43

## 2017-02-15 RX ADMIN — Medication 125 MILLIGRAM(S): at 06:59

## 2017-02-15 RX ADMIN — Medication 50 MILLIGRAM(S): at 06:59

## 2017-02-15 RX ADMIN — Medication 1000 UNIT(S): at 13:44

## 2017-02-15 RX ADMIN — ATENOLOL 25 MILLIGRAM(S): 25 TABLET ORAL at 06:59

## 2017-02-15 RX ADMIN — Medication 125 MILLIGRAM(S): at 18:44

## 2017-02-15 RX ADMIN — Medication 125 MILLIGRAM(S): at 00:21

## 2017-02-15 RX ADMIN — Medication 125 MILLIGRAM(S): at 23:32

## 2017-02-15 RX ADMIN — SIMVASTATIN 20 MILLIGRAM(S): 20 TABLET, FILM COATED ORAL at 21:49

## 2017-02-15 RX ADMIN — Medication 250 MILLIGRAM(S): at 06:59

## 2017-02-15 RX ADMIN — PANTOPRAZOLE SODIUM 40 MILLIGRAM(S): 20 TABLET, DELAYED RELEASE ORAL at 06:59

## 2017-02-15 NOTE — PROGRESS NOTE ADULT - SUBJECTIVE AND OBJECTIVE BOX
Chief Complaint:  skin tear      Assessment:  3x3x.1 open skin tear on right upper arm, base red 100%, serous sanguineous drainage scant, type 2, open.  Periwound intact       Plan:  xeroform daily and dcd

## 2017-02-15 NOTE — PROGRESS NOTE ADULT - SUBJECTIVE AND OBJECTIVE BOX
Patient is a 76y old  Female who presents with a chief complaint of Bloody stools. (10 Feb 2017 09:36)      HPI:   No diarrhea at this point. No abdominal pain. Indium scan in progress    REVIEW OF SYSTEMS:  Constitutional: No fever, weight loss or fatigue  ENMT:  No difficulty hearing, tinnitus, vertigo; No sinus or throat pain  Respiratory: No cough, wheezing, chills or hemoptysis  Cardiovascular: No chest pain, palpitations, dizziness or leg swelling  Gastrointestinal: No abdominal or epigastric pain. No nausea, vomiting or hematemesis; No diarrhea or constipation. No melena or hematochezia.  Skin: No itching, burning, rashes or lesions   Musculoskeletal: No joint pain or swelling; No muscle, back or extremity pain    PAST MEDICAL & SURGICAL HISTORY:  Breast cancer: right lumpectomy RT/CT  Lung cancer: right CT/RT  GERD (gastroesophageal reflux disease)  Sinus problem  Hypothyroid  Hyperparathyroidism  Smoker  Hyperlipidemia  COPD (chronic obstructive pulmonary disease)  Anxiety  Palpitations  Atrial fibrillation: PAF  S/P lumpectomy, right breast: CT/RT  S/P partial lobectomy of lung: right      FAMILY HISTORY:  No pertinent family history in first degree relatives      SOCIAL HISTORY:  Smoking Status: [ ] Current, [ ] Former, [ ] Never  Pack Years:  [  ] EtOH  [  ] IVDA    MEDICATIONS:  MEDICATIONS  (STANDING):  flecainide 50milliGRAM(s) Oral every 12 hours  simvastatin 20milliGRAM(s) Oral at bedtime  ATENolol  Tablet 25milliGRAM(s) Oral daily  cholecalciferol 1000Unit(s) Oral daily  pantoprazole    Tablet 40milliGRAM(s) Oral before breakfast  vancomycin    Solution 125milliGRAM(s) Oral every 6 hours  saccharomyces boulardii 250milliGRAM(s) Oral two times a day  fluticasone propionate 50 MICROgram(s)/spray Nasal Spray 1Spray(s) Both Nostrils two times a day    MEDICATIONS  (PRN):  acetaminophen   Tablet 650milliGRAM(s) Oral every 6 hours PRN For Temp greater than 38 C (100.4 F)      Allergies    adhesives (Rash)  Ceclor (Rash)  cephalosporins (Anaphylaxis)  codeine (Hives)  Demerol HCl (Hives)  penicillin (Short breath)  sulfa drugs (Hives)  Sulfac 10% (Anaphylaxis; Short breath)    Intolerances        Vital Signs Last 24 Hrs  T(C): 37.4, Max: 37.8 (02-14 @ 16:30)  T(F): 99.3, Max: 100.1 (02-14 @ 16:30)  HR: 74 (69 - 74)  BP: 122/50 (96/52 - 124/56)  BP(mean): --  RR: 18 (18 - 18)  SpO2: 98% (97% - 99%)    I & Os for current day (as of 02-15 @ 11:10)  =============================================  IN: 240 ml / OUT: 0 ml / NET: 240 ml        PHYSICAL EXAM:    General: Well developed; well nourished; in no acute distress  HEENT: MMM, conjunctiva and sclera clear  Lungs- CTA  Gastrointestinal: Soft, non-tender non-distended; Normal bowel sounds; No rebound or guarding  Extremities: Normal range of motion, No clubbing, cyanosis or edema  Neurological: Alert and oriented x3  Skin: Warm and dry. No obvious rash      LABS:                        7.1    4.6   )-----------( 371      ( 15 Feb 2017 06:15 )             22.3     15 Feb 2017 06:15    139    |  100    |  14.0   ----------------------------<  91     4.5     |  28.0   |  0.76     Ca    10.4       15 Feb 2017 06:15  Phos  3.0       15 Feb 2017 06:15  Mg     2.3       15 Feb 2017 06:15                RADIOLOGY & ADDITIONAL STUDIES:

## 2017-02-15 NOTE — PROGRESS NOTE ADULT - PROBLEM SELECTOR PLAN 1
Vanco 2 weeks. ID following. Indium scan in progress. Dr Shelton recommends temporal artery biopsy. rheum evaluation, bone marrow bx.      Low residue diet. Eventual  Left colon resection when C Diff resolves

## 2017-02-15 NOTE — PROGRESS NOTE ADULT - SUBJECTIVE AND OBJECTIVE BOX
BRANNON GRANT Patient is a 76y old  Female who presents with a chief complaint of Bloody stools. (10 Feb 2017 09:36)     HPI:  76 years old female with PMH of HTN, A.Fib, dyslipidemia, Anxiety and abnormal colonoscopy on 11/2016 presented to the ER for the evaluation of continued bloody stools associated with fatigue and generalized weakness. She was seen in the ER 2 days ago for anemia and received PRBC. She was sent home to follow up with her PCP. Patient reports 1 month of fever for which she was seen by . He ordered blood work but didn't put her on any antibiotic. Patient also reports poor appetites, loss of weight and constipation. Patient also has a h/o colovaginal fistula for which she was seen by   but no surgical intervention was offered (per patient). Denies any abdominal pain, nausea, vomiting, SOB, dizziness or lightheadedness. (07 Feb 2017 16:29)    The patient was seen and evaluated FUO  The patient is in no acute distress.  Denied any fever chest pain, palpitations, shortness of breath, abdominal pain, fever, dysuria, cough, edema   I&O's Summary    I & Os for current day (as of 15 Feb 2017 13:25)  =============================================  IN: 240 ml / OUT: 0 ml / NET: 240 ml    Allergies    adhesives (Rash)  Ceclor (Rash)  cephalosporins (Anaphylaxis)  codeine (Hives)  Demerol HCl (Hives)  penicillin (Short breath)  sulfa drugs (Hives)  Sulfac 10% (Anaphylaxis; Short breath)    Intolerances      HEALTH ISSUES - PROBLEM Dx:  Prophylactic measure: Prophylactic measure  Dyslipidemia: Dyslipidemia  Gastroesophageal reflux disease without esophagitis: Gastroesophageal reflux disease without esophagitis  Chronic atrial fibrillation: Chronic atrial fibrillation  Anemia, unspecified type: Anemia, unspecified type  Clostridium difficile diarrhea: Clostridium difficile diarrhea  Diverticulosis of large intestine without perforation or abscess: Diverticulosis of large intestine without perforation or abscess  Clostridium difficile colitis: Clostridium difficile colitis  Fever due to unspecified condition: Fever due to unspecified condition  Anemia: Anemia  Colovaginal fistula: Colovaginal fistula  Fever: Fever  Hyperlipidemia: Hyperlipidemia  Hypothyroid: Hypothyroid  Atrial fibrillation: Atrial fibrillation  Gastrointestinal hemorrhage, unspecified gastrointestinal hemorrhage type: Gastrointestinal hemorrhage, unspecified gastrointestinal hemorrhage type        PAST MEDICAL & SURGICAL HISTORY:  Breast cancer: right lumpectomy RT/CT  Lung cancer: right CT/RT  GERD (gastroesophageal reflux disease)  Sinus problem  Hypothyroid  Hyperparathyroidism  Smoker  Hyperlipidemia  COPD (chronic obstructive pulmonary disease)  Anxiety  Palpitations  Atrial fibrillation: PAF  S/P lumpectomy, right breast: CT/RT  S/P partial lobectomy of lung: right          Vital Signs Last 24 Hrs  T(C): 37.3, Max: 37.8 (02-14 @ 16:30)  T(F): 99.2, Max: 100.1 (02-14 @ 16:30)  HR: 71 (69 - 74)  BP: 116/58 (116/58 - 124/56)  BP(mean): --  RR: 18 (18 - 18)  SpO2: 97% (97% - 98%)T(C): 37.3, Max: 37.8 (02-14 @ 16:30)  HR: 71 (69 - 74)  BP: 116/58 (116/58 - 124/56)  RR: 18 (18 - 18)  SpO2: 97% (97% - 98%)  Wt(kg): --    PHYSICAL EXAM:    GENERAL: NAD, well-groomed, well-developed  HEAD:  Atraumatic, Normocephalic  EYES: EOMI, PERRLA, conjunctiva and sclera clear  ENMT:  Moist mucous membranes,  No lesions  NECK: Supple, No JVD, Normal thyroid  NERVOUS SYSTEM:  Alert & Oriented X3,  Moves upper and lower extremities;  CHEST/LUNG: Clear to auscultation bilaterally; No rales, rhonchi, wheezing,   HEART: Regular rate and rhythm; No murmurs,   ABDOMEN: Soft, Nontender, Nondistended; Bowel sounds present  EXTREMITIES:  Peripheral Pulses, No  cyanosis, or edema  SKIN: No rashes or lesions    flecainide 50milliGRAM(s) Oral every 12 hours  simvastatin 20milliGRAM(s) Oral at bedtime  ATENolol  Tablet 25milliGRAM(s) Oral daily  cholecalciferol 1000Unit(s) Oral daily  pantoprazole    Tablet 40milliGRAM(s) Oral before breakfast  acetaminophen   Tablet 650milliGRAM(s) Oral every 6 hours PRN  vancomycin    Solution 125milliGRAM(s) Oral every 6 hours  fluticasone propionate 50 MICROgram(s)/spray Nasal Spray 1Spray(s) Both Nostrils two times a day      LABS:                          7.1    4.6   )-----------( 371      ( 15 Feb 2017 06:15 )             22.3     15 Feb 2017 06:15    139    |  100    |  14.0   ----------------------------<  91     4.5     |  28.0   |  0.76     Ca    10.4       15 Feb 2017 06:15  Phos  3.0       15 Feb 2017 06:15  Mg     2.3       15 Feb 2017 06:15                CAPILLARY BLOOD GLUCOSE      RADIOLOGY & ADDITIONAL TESTS:      Consultant notes reviewed    Case discussed with consultant/provider/ family /patient

## 2017-02-15 NOTE — PROGRESS NOTE ADULT - ASSESSMENT
76 years old female with PMH of HTN, A.Fib, dyslipidemia, lung/breast cancer and abnormal colonoscopy in 11/2016 admitted with GI bleed.    > Cdif colitis, sigmoid thickening, colovaginal fistula -  improving on Vancomycin.  Continue Abx x 14d. Florastor.   Fever temopral arterey biopsy -recommeded by ID refused by patient she wants to wait for indium scan results   > Fever, Noted M spike on recent SPEP,  patient refused BM aspirate by hematology workup.  / GYN evaluation of possible colovaginal fistula.  Will send basic autoimmune screening work up including ESR, CRP, FORTUNATO, RF and obtain rheumatology work up.    > Anemia / Gastrointestinal hemorrhage, unspecified gastrointestinal hemorrhage type. abnormal colonoscopy- hgb now stable, off ASA.  Continue PPI.  Check LDH, Haptoglobin.   > Atrial fibrillation : rate controlled, continue Flecainide and Atenolol.  Holding antiplatelets, no anticoagulation due to bleeding.  > Hyperlipidemia.  :on Zocor.   DVT prophylaxis - VCD  Called rheumatology Dr sahu yesterday to consult ,given 6 weeks fever fatigues - wants to defer any kind of surgical exploration, she refused temporal artery biopsy and doesn't want bone marrow biopsy at present

## 2017-02-15 NOTE — PROGRESS NOTE ADULT - SUBJECTIVE AND OBJECTIVE BOX
[facility] [place] LENNOX ARELLANO MD FACP   SOTO HAQ MD  Diplomates American Board of Internal Medicine and Infectious Diseases      BRANNON GRANT  [id number]  76y    INTERVAL HPI/OVERNIGHT EVENTS:  TEMP APPROX 100  REFUSED BM  AWAITING RHEUM EVAL  IND SCAN NOT COMPLETED    FEVER tmax >100  Vital Signs Last 24 Hrs  T(C): 37.5, Max: 38.8 (02-12 @ 15:00)  T(F): 99.5, Max: 101.9 (02-12 @ 15:00)  HR: 72 (63 - 73)  BP: 120/62 (104/62 - 120/62)  BP(mean): --  RR: 18 (16 - 18)  SpO2: 95% (95% - 97%)    ANTIBIOTICS  vancomycin    Solution 125milliGRAM(s) Oral every 6 hours      Allergies    adhesives (Rash)  Ceclor (Rash)  cephalosporins (Anaphylaxis)  codeine (Hives)  Demerol HCl (Hives)  penicillin (Short breath)  sulfa drugs (Hives)  Sulfac 10% (Anaphylaxis; Short breath)    Intolerances        REVIEW OF SYSTEMS: FEVER AND COUGH  NO DIARRHEA    PHYSICAL EXAM:  Vital Signs Last 24 Hrs  T(C): 37.5, Max: 38.8 (02-12 @ 15:00)  T(F): 99.5, Max: 101.9 (02-12 @ 15:00)  HR: 72 (63 - 73)  BP: 120/62 (104/62 - 120/62)  BP(mean): --  RR: 18 (16 - 18)  SpO2: 95% (95% - 97%)      GEN: NAD, pleasant  HEENT: normocephalic and atraumatic. EOMI. STEPHANIE. Moist mucosa. Clear Posterior pharynx.  NECK: Supple. No carotid bruits.  No lymphadenopathy or thyromegaly.  LUNGS: Clear to auscultation.  HEART: Regular rate and rhythm without murmur.  ABDOMEN: Soft, nontender, and nondistended.  Positive bowel sounds.  No hepatosplenomegaly was noted.  EXTREMITIES: Without any cyanosis, clubbing, rash, lesions or edema.  NEUROLOGIC: Cranial nerves II through XII are grossly intact.  MUSCULOSKELETAL:  SKIN: No ulceration or induration present    LABS:                        7.3    5.45  )-----------( 368      ( 13 Feb 2017 07:05 )             22.5       13 Feb 2017 07:09    135    |  99     |  17.0   ----------------------------<  96     4.4     |  28.0   |  0.71     Ca    9.7        13 Feb 2017 07:09          02-13 @ 07:05  hct 22.5 % hgb 7.3 g/dL    02-13 @ 07:05  plat 368 K/uL wbc 5.45 K/uL      02-13-17  creat 0.71 mg/dL gfr 95 mL/min/1.73M2 bun 17.0 mg/dL k 4.4 mmol/L      MICROBIOLOGY:  Culture Results:   Giardia antigen negative performed by rapid immunoassay (non-enzymatic).  (It is recommended that all specimens tested by  an immunochromatographic card test be  confirmed by another method.) (02-10 @ 23:25)  Culture Results:   Testing in progress (02-09 @ 17:39)  Culture Results:   No enteric pathogens isolated.  (Stool culture examined for Salmonella,  Shigella, Campylobacter, Aeromonas, Plesiomonas,  Vibrio, E.coli O157 and Yersinia) (02-08 @ 18:54)  Culture Results:   No growth at 5 days. (02-07 @ 18:26)  Culture Results:   No growth at 5 days. (02-07 @ 18:26)  Culture Results:   50,000 CFU/ml Coag Negative Staphylococcus  25,000 CFU/ml Enterococcus raffinosus (02-07 @ 16:11)        RADIOLOGY & ADDITIONAL STUDIES:      ASSESSMENT AND PLAN:  TRUE FUO - FEVER AND NO SOURCE  CDIFF FROM OUT PT PRIOR ABS AND ASX NOW - D/C ISOLATION    ELEV SED AND IGG SPIKE ON IEP  CHRONIC ANEMIA IN PT WITH H/O TB IN PAST    PLAN  ECHO AND BLOOD C/S TO PUT DX ENDOCARDITIS TO REST  R/O PMR/TA - RHEUM EVAL FOR TEMP ART BX  STRONGLY CONSIDER BONE MARROW BX FOR PRIMARY HEME CAUSE OF FEVER AND BM CULT FOR AFB TO R/O REMOTE POSS EXTRAPULM TB  GALLIUM SCAN TO R/O LLQ PROCESS    SED RATE AND CRP ELEVATED  DIFF DX TRUE FUO  STRONGLY SUGGEST TEMPORAL ARTERY BX - DR. SHAFFER CAN DO  RHEUM EVAL DR. KLEINER  INDIUYM SCAN PENDING    AGAIN STRONGLY SUGGES BM BX AND CULT TO R/O PRIMARY BM DISORDER AND TO R/O EXTRAPULM TB  PT REFUSING AT THIS TIME BM BX AND POSS REFUSING TA BX  IF INDIUM POS NEEDS TO BE LOCALIZED TO AREA OF PSS ABN AS RECENT CDIFF MAY GIVE FALSE POS  IF PT REFUSING FURTHER EVAL SEE NO FURTHER NEED FOR IN HOSP STAY  NEEDS TX OF ANEMIA AND HEME FINAL EVAL  D/W PT, DR. ARANDA  WILL NO LONGER SEE IF PT REFUSING FURTHER EVAL  D/W PT      MATTHEW ARELLANO MD FACP

## 2017-02-16 ENCOUNTER — RESULT REVIEW (OUTPATIENT)
Age: 77
End: 2017-02-16

## 2017-02-16 DIAGNOSIS — E03.9 HYPOTHYROIDISM, UNSPECIFIED: ICD-10-CM

## 2017-02-16 LAB
IGA FLD-MCNC: 145 MG/DL — SIGNIFICANT CHANGE UP (ref 68–378)
IGG FLD-MCNC: 1380 MG/DL — SIGNIFICANT CHANGE UP (ref 694–1618)
IGM SERPL-MCNC: 123 MG/DL — SIGNIFICANT CHANGE UP (ref 40–230)
KAPPA LC SER QL IFE: 5.34 MG/DL — HIGH (ref 0.33–1.94)
KAPPA LC SER QL IFE: 5.34 MG/DL — HIGH (ref 0.33–1.94)
KAPPA/LAMBDA FREE LIGHT CHAIN RATIO, SERUM: 1.37 RATIO — SIGNIFICANT CHANGE UP (ref 0.26–1.65)
KAPPA/LAMBDA FREE LIGHT CHAIN RATIO, SERUM: 1.37 RATIO — SIGNIFICANT CHANGE UP (ref 0.26–1.65)
LAMBDA LC SER QL IFE: 3.89 MG/DL — HIGH (ref 0.57–2.63)
LAMBDA LC SER QL IFE: 3.89 MG/DL — HIGH (ref 0.57–2.63)

## 2017-02-16 PROCEDURE — 99233 SBSQ HOSP IP/OBS HIGH 50: CPT

## 2017-02-16 RX ADMIN — SIMVASTATIN 20 MILLIGRAM(S): 20 TABLET, FILM COATED ORAL at 23:06

## 2017-02-16 RX ADMIN — Medication 50 MILLIGRAM(S): at 06:01

## 2017-02-16 RX ADMIN — Medication 1000 UNIT(S): at 12:28

## 2017-02-16 RX ADMIN — Medication 50 MILLIGRAM(S): at 17:09

## 2017-02-16 RX ADMIN — PANTOPRAZOLE SODIUM 40 MILLIGRAM(S): 20 TABLET, DELAYED RELEASE ORAL at 06:01

## 2017-02-16 RX ADMIN — ATENOLOL 25 MILLIGRAM(S): 25 TABLET ORAL at 06:01

## 2017-02-16 NOTE — PROGRESS NOTE ADULT - PROBLEM SELECTOR PLAN 1
vanco treatment. As per ID. Low residue diet. Nothing further from GI stanpoint. May F/U with Dr Hernandes as outpatient. Will need elective left hemicolectomy. Fever workup as per ID

## 2017-02-16 NOTE — PROGRESS NOTE ADULT - SUBJECTIVE AND OBJECTIVE BOX
BRANNON GRANT     Chief Complaint: Patient is a 76y old  Female who presents with a chief complaint of Bloody stools. (10 Feb 2017 09:36)      PAST MEDICAL & SURGICAL HISTORY:  Breast cancer: right lumpectomy RT/CT  Lung cancer: right CT/RT  GERD (gastroesophageal reflux disease)  Sinus problem  Hypothyroid  Hyperparathyroidism  Smoker  Hyperlipidemia  COPD (chronic obstructive pulmonary disease)  Anxiety  Palpitations  Atrial fibrillation: PAF  S/P lumpectomy, right breast: CT/RT  S/P partial lobectomy of lung: right      HPI/OVERNIGHT EVENTS: patient scheduled for bone marrow tomorow    MEDICATIONS  (STANDING):  flecainide 50milliGRAM(s) Oral every 12 hours  simvastatin 20milliGRAM(s) Oral at bedtime  ATENolol  Tablet 25milliGRAM(s) Oral daily  cholecalciferol 1000Unit(s) Oral daily  pantoprazole    Tablet 40milliGRAM(s) Oral before breakfast  fluticasone propionate 50 MICROgram(s)/spray Nasal Spray 1Spray(s) Both Nostrils two times a day      Allergies: adhesives (Rash)  Ceclor (Rash)  cephalosporins (Anaphylaxis)  codeine (Hives)  Demerol HCl (Hives)  penicillin (Short breath)  sulfa drugs (Hives)  Sulfac 10% (Anaphylaxis; Short breath)      REVIEW OF SYSTEMS:    CONSTITUTIONAL: No fever  ENMT:  No difficulty hearing, tinnitus, vertigo; No sinus or throat pain  NECK: No pain or stiffness  RESPIRATORY: No cough, wheezing, chills or hemoptysis; No shortness of breath  CARDIOVASCULAR: No chest pain, palpitations, dizziness, or leg swelling  GASTROINTESTINAL: No abdominal or epigastric pain. No nausea, vomiting, or hematemesis; No diarrhea or constipation. No melena or hematochezia.  GENITOURINARY: No dysuria, frequency, hematuria, or incontinence  NEUROLOGICAL: No headaches, memory loss, loss of strength, numbness, or tremors  SKIN: No itching, burning, rashes, or lesions   MUSCULOSKELETAL: No joint pain or swelling; No muscle, back, or extremity pain  PSYCHIATRIC: No depression, anxiety, mood swings, or difficulty sleeping    Vital Signs Last 24 Hrs  T(C): 37.4, Max: 37.5 (02-15 @ 21:43)  T(F): 99.3, Max: 99.5 (02-15 @ 21:43)  HR: 78 (68 - 80)  BP: 110/54 (96/40 - 128/50)  BP(mean): --  RR: 20 (18 - 20)  SpO2: 94% (94% - 98%)    PHYSICAL EXAM:  Constitutional: NAD, well-groomed, well-developed  HEENT: PERRLA, EOMI, Normal Hearing, MMM  Neck: No LAD, No JVD  Back: Normal spine flexure, No CVA tenderness  Respiratory: CTAB Cardiovascular: S1 and S2, RRR, no M/G/R  Gastrointestinal: BS+, soft, NT/ND  Extremities: No peripheral edema  Vascular: 2+ peripheral pulses  Neurological: A/O x 3, no focal deficits  Psychiatric: Normal mood, normal affect  Musculoskeletal: 5/5 strength b/l upper and lower extremities  Skin: No rashes        CAPILLARY BLOOD GLUCOSE    LABS:                        7.1    4.6   )-----------( 371      ( 15 Feb 2017 06:15 )             22.3     15 Feb 2017 06:15    139    |  100    |  14.0   ----------------------------<  91     4.5     |  28.0   |  0.76     Ca    10.4       15 Feb 2017 06:15  Phos  3.0       15 Feb 2017 06:15  Mg     2.3       15 Feb 2017 06:15            RADIOLOGY & ADDITIONAL TESTS:

## 2017-02-16 NOTE — PROGRESS NOTE ADULT - SUBJECTIVE AND OBJECTIVE BOX
[facility]  MD Mayelin Weathers MD  [phone]  Answering Amanda : [phone]    [id number]    INTERVAL HPI/OVERNIGHT EVENTS:    Vital Signs Last 24 Hrs  T(C): 37.1, Max: 37.4 (02-16 @ 05:57)  T(F): 98.7, Max: 99.3 (02-16 @ 05:57)  HR: 68 (68 - 80)  BP: 122/58 (96/40 - 128/50)  BP(mean): --  RR: 18 (18 - 20)  SpO2: 96% (94% - 98%)  ANTIBIOTICS      Allergies    adhesives (Rash)  Ceclor (Rash)  cephalosporins (Anaphylaxis)  codeine (Hives)  Demerol HCl (Hives)  penicillin (Short breath)  sulfa drugs (Hives)  Sulfac 10% (Anaphylaxis; Short breath)    Intolerances        REVIEW OF SYSTEMS:    CONSTITUTIONAL:  As per HPI.    HEENT:  Eyes:  No diplopia or blurred vision. ENT:  No earache, sore throat or runny nose.    CARDIOVASCULAR:  No pressure, squeezing, strangling, tightness, heaviness or aching about the chest, neck, axilla or epigastrium.    RESPIRATORY:  No cough, shortness of breath, PND or orthopnea.    GASTROINTESTINAL:  No nausea, vomiting or diarrhea.    GENITOURINARY:  No dysuria, frequency or urgency.    MUSCULOSKELETAL:  As per HPI.    SKIN:  No change in skin, hair or nails.    NEUROLOGIC:  CNI,MOTOR WNL, SENSORY WNL.    ENDOCRINE:  No heat or cold intolerance, polyuria or polydipsia.    HEMATOLOGICAL:  No easy bruising or bleeding.           PHYSICAL EXAMINATION:    GENERAL: The patient is a well-developed, well-nourished _____in no apparent distress. ___ is alert and oriented x3.    VITAL SIGNS:     HEENT: Head is normocephalic and atraumatic. Extraocular muscles are intact. Pupils are equal, round, and reactive to light and accommodation. Nares appeared normal. Mouth is well hydrated and without lesions. Mucous membranes are moist. Posterior pharynx clear of any exudate or lesions.    NECK: Supple. No carotid bruits.  No lymphadenopathy or thyromegaly.    LUNGS: Clear to auscultation.    HEART: Regular rate and rhythm without murmur.    ABDOMEN: Soft, nontender, and nondistended.  Positive bowel sounds.  No hepatosplenomegaly was noted.    EXTREMITIES: Without any cyanosis, clubbing, rash, lesions or edema.    NEUROLOGIC: Cranial nerves II through XII are grossly intact.    PSYCHIATRIC: Flat affect, but denies suicidal or homicidal ideations.  SKIN: No ulceration or induration present.      LABS:                        7.1    4.6   )-----------( 371      ( 15 Feb 2017 06:15 )             22.3     15 Feb 2017 06:15    139    |  100    |  14.0   ----------------------------<  91     4.5     |  28.0   |  0.76     Ca    10.4       15 Feb 2017 06:15  Phos  3.0       15 Feb 2017 06:15  Mg     2.3       15 Feb 2017 06:15                RADIOLOGY & ADDITIONAL STUDIES:          ASSESSMENT:    Anemia, FUO  R/o primary bone marrow disorder    PLAN:    Agreeing for bone marrow exam   Procedure explained  Will plan to perform bone mrrow tomorrow      Mony Layne MD

## 2017-02-16 NOTE — PROGRESS NOTE ADULT - SUBJECTIVE AND OBJECTIVE BOX
Patient is a 76y old  Female who presents with a chief complaint of Bloody stools. (10 Feb 2017 09:36)      HPI: NO fever yesterday. Has no diarrhea. Had 2 formed brown BM yesterday. One BM today . No abdominal pain. Patient had indium scan. Uptake in right colon and left lung. Pt with dry cough          REVIEW OF SYSTEMS:  Constitutional: No fever, weight loss or fatigue  ENMT:  No difficulty hearing, tinnitus, vertigo; No sinus or throat pain  Respiratory: No cough, wheezing, chills or hemoptysis  Cardiovascular: No chest pain, palpitations, dizziness or leg swelling  Gastrointestinal: No abdominal or epigastric pain. No nausea, vomiting or hematemesis; No diarrhea or constipation. No melena or hematochezia.  Skin: No itching, burning, rashes or lesions   Musculoskeletal: No joint pain or swelling; No muscle, back or extremity pain    PAST MEDICAL & SURGICAL HISTORY:  Breast cancer: right lumpectomy RT/CT  Lung cancer: right CT/RT  GERD (gastroesophageal reflux disease)  Sinus problem  Hypothyroid  Hyperparathyroidism  Smoker  Hyperlipidemia  COPD (chronic obstructive pulmonary disease)  Anxiety  Palpitations  Atrial fibrillation: PAF  S/P lumpectomy, right breast: CT/RT  S/P partial lobectomy of lung: right      FAMILY HISTORY:  No pertinent family history in first degree relatives      SOCIAL HISTORY:  Smoking Status: [ ] Current, [ ] Former, [ ] Never  Pack Years:  [  ] EtOH  [  ] IVDA    MEDICATIONS:  MEDICATIONS  (STANDING):  flecainide 50milliGRAM(s) Oral every 12 hours  simvastatin 20milliGRAM(s) Oral at bedtime  ATENolol  Tablet 25milliGRAM(s) Oral daily  cholecalciferol 1000Unit(s) Oral daily  pantoprazole    Tablet 40milliGRAM(s) Oral before breakfast  fluticasone propionate 50 MICROgram(s)/spray Nasal Spray 1Spray(s) Both Nostrils two times a day    MEDICATIONS  (PRN):  acetaminophen   Tablet 650milliGRAM(s) Oral every 6 hours PRN For Temp greater than 38 C (100.4 F)      Allergies    adhesives (Rash)  Ceclor (Rash)  cephalosporins (Anaphylaxis)  codeine (Hives)  Demerol HCl (Hives)  penicillin (Short breath)  sulfa drugs (Hives)  Sulfac 10% (Anaphylaxis; Short breath)    Intolerances        Vital Signs Last 24 Hrs  T(C): 37.4, Max: 37.7 (02-15 @ 17:00)  T(F): 99.3, Max: 99.9 (02-15 @ 17:00)  HR: 72 (68 - 72)  BP: 128/50 (104/46 - 128/50)  BP(mean): --  RR: 18 (18 - 18)  SpO2: 98% (96% - 98%)    I & Os for current day (as of 02-16 @ 09:47)  =============================================  IN: 240 ml / OUT: 0 ml / NET: 240 ml        PHYSICAL EXAM:    General: Well developed; well nourished; in no acute distress  HEENT: MMM, conjunctiva and sclera clear  Gastrointestinal: Soft, non-tender non-distended; Normal bowel sounds; No rebound or guarding  Extremities: Normal range of motion, No clubbing, cyanosis or edema  Neurological: Alert and oriented x3  Skin: Warm and dry. No obvious rash      LABS:                        7.1    4.6   )-----------( 371      ( 15 Feb 2017 06:15 )             22.3     15 Feb 2017 06:15    139    |  100    |  14.0   ----------------------------<  91     4.5     |  28.0   |  0.76     Ca    10.4       15 Feb 2017 06:15  Phos  3.0       15 Feb 2017 06:15  Mg     2.3       15 Feb 2017 06:15                RADIOLOGY & ADDITIONAL STUDIES:

## 2017-02-17 DIAGNOSIS — K56.69 OTHER INTESTINAL OBSTRUCTION: ICD-10-CM

## 2017-02-17 LAB
ALLERGY+IMMUNOLOGY DIAG STUDY NOTE: SIGNIFICANT CHANGE UP
ANION GAP SERPL CALC-SCNC: 10 MMOL/L — SIGNIFICANT CHANGE UP (ref 5–17)
BLD GP AB SCN SERPL QL: ABNORMAL
BUN SERPL-MCNC: 16 MG/DL — SIGNIFICANT CHANGE UP (ref 8–20)
CALCIUM SERPL-MCNC: 10.8 MG/DL — HIGH (ref 8.6–10.2)
CHLORIDE SERPL-SCNC: 98 MMOL/L — SIGNIFICANT CHANGE UP (ref 98–107)
CO2 SERPL-SCNC: 28 MMOL/L — SIGNIFICANT CHANGE UP (ref 22–29)
CREAT SERPL-MCNC: 0.85 MG/DL — SIGNIFICANT CHANGE UP (ref 0.5–1.3)
DIR ANTIGLOB POLYSPECIFIC INTERPRETATION: SIGNIFICANT CHANGE UP
GLUCOSE SERPL-MCNC: 93 MG/DL — SIGNIFICANT CHANGE UP (ref 70–115)
GRAM STN FLD: SIGNIFICANT CHANGE UP
HCT VFR BLD CALC: 22.7 % — LOW (ref 37–47)
HGB BLD-MCNC: 7.4 G/DL — LOW (ref 12–16)
MAGNESIUM SERPL-MCNC: 2.2 MG/DL — SIGNIFICANT CHANGE UP (ref 1.8–2.5)
MCHC RBC-ENTMCNC: 29.7 PG — SIGNIFICANT CHANGE UP (ref 27–31)
MCHC RBC-ENTMCNC: 32.6 G/DL — SIGNIFICANT CHANGE UP (ref 32–36)
MCV RBC AUTO: 91.2 FL — SIGNIFICANT CHANGE UP (ref 81–99)
PHOSPHATE SERPL-MCNC: 2.9 MG/DL — SIGNIFICANT CHANGE UP (ref 2.4–4.7)
PLATELET # BLD AUTO: 406 K/UL — HIGH (ref 150–400)
POTASSIUM SERPL-MCNC: 4.7 MMOL/L — SIGNIFICANT CHANGE UP (ref 3.5–5.3)
POTASSIUM SERPL-SCNC: 4.7 MMOL/L — SIGNIFICANT CHANGE UP (ref 3.5–5.3)
RBC # BLD: 2.49 M/UL — LOW (ref 4.4–5.2)
RBC # FLD: 14.4 % — SIGNIFICANT CHANGE UP (ref 11–15.6)
SODIUM SERPL-SCNC: 136 MMOL/L — SIGNIFICANT CHANGE UP (ref 135–145)
SPECIMEN SOURCE: SIGNIFICANT CHANGE UP
TYPE + AB SCN PNL BLD: SIGNIFICANT CHANGE UP
WBC # BLD: 5.2 K/UL — SIGNIFICANT CHANGE UP (ref 4.8–10.8)
WBC # FLD AUTO: 5.2 K/UL — SIGNIFICANT CHANGE UP (ref 4.8–10.8)

## 2017-02-17 PROCEDURE — 88313 SPECIAL STAINS GROUP 2: CPT | Mod: 26

## 2017-02-17 PROCEDURE — 88305 TISSUE EXAM BY PATHOLOGIST: CPT | Mod: 26

## 2017-02-17 PROCEDURE — 85097 BONE MARROW INTERPRETATION: CPT

## 2017-02-17 PROCEDURE — 99233 SBSQ HOSP IP/OBS HIGH 50: CPT

## 2017-02-17 RX ADMIN — SIMVASTATIN 20 MILLIGRAM(S): 20 TABLET, FILM COATED ORAL at 23:21

## 2017-02-17 RX ADMIN — ATENOLOL 25 MILLIGRAM(S): 25 TABLET ORAL at 06:30

## 2017-02-17 RX ADMIN — Medication 50 MILLIGRAM(S): at 19:00

## 2017-02-17 RX ADMIN — PANTOPRAZOLE SODIUM 40 MILLIGRAM(S): 20 TABLET, DELAYED RELEASE ORAL at 06:30

## 2017-02-17 RX ADMIN — Medication 1000 UNIT(S): at 19:00

## 2017-02-17 RX ADMIN — Medication 50 MILLIGRAM(S): at 06:30

## 2017-02-17 NOTE — CONSULT NOTE ADULT - CONSULT REQUESTED DATE/TIME
08-Feb-2017 08:01
08-Feb-2017 08:43
08-Feb-2017 21:11
10-Feb-2017
10-Feb-2017 11:34
14-Feb-2017 12:20
17-Feb-2017 18:15

## 2017-02-17 NOTE — CONSULT NOTE ADULT - SUBJECTIVE AND OBJECTIVE BOX
Pt known to Dr. Mckenzie, presents with bloody stools, has subjective diagnosis of colovaginal fistula, pt reports stool like discharge from vagina.  Review of outside studies reveal evidence of extensive diverticular disease and narrowing of colon.  Pt reports having normal stools and diarrhea.  Recently treated with antibiotics.      Vital Signs Last 24 Hrs  T(C): 37.2, Max: 37.2 (02-17 @ 06:02)  T(F): 99, Max: 99 (02-17 @ 06:02)  HR: 76 (68 - 78)  BP: 120/50 (110/54 - 122/58)  BP(mean): --  RR: 18 (18 - 20)  SpO2: 96% (94% - 96%)    Gen NAD  HEENT PERRL, EOMI  Neck supple  Lungs clear bilaterally  Heart RRR  Abd soft, NTND  Extr no edema    A/P:  Pt with diverticular bleed, evidence of diverticulosis and chronic diverticulitis, with possible colovaginal fistula.  Recommend completion of fever w/u, stabilize LGIB, transfuse as necessary, anticipate excision of affected colon by Dr. Mckenzie next week.  Dr. Mckenzie back from conference on Tuesday.  Medically optimize and eventually will require clearance.  Thank you for the courtesy of this consult.  Will follow along closely with you. Pt known to Dr. Mckenzie, presents with bloody stools, has subjective diagnosis of colovaginal fistula, pt reports stool like discharge from vagina.  Review of outside studies reveal evidence of extensive diverticular disease and narrowing of colon.  Pt reports having normal stools and diarrhea.  Recently treated with antibiotics for C. diff.      Vital Signs Last 24 Hrs  T(C): 37.2, Max: 37.2 (02-17 @ 06:02)  T(F): 99, Max: 99 (02-17 @ 06:02)  HR: 76 (68 - 78)  BP: 120/50 (110/54 - 122/58)  BP(mean): --  RR: 18 (18 - 20)  SpO2: 96% (94% - 96%)    Gen NAD  HEENT PERRL, EOMI  Neck supple  Lungs clear bilaterally  Heart RRR  Abd soft, NTND  Extr no edema    A/P:  Pt with diverticular bleed, evidence of diverticulosis and chronic diverticulitis, with possible colovaginal fistula.  Recommend completion of fever w/u, stabilize LGIB, transfuse as necessary, anticipate excision of affected colon by Dr. Mckenzie next week.  Dr. Mckenzie back from conference on Tuesday.  Medically optimize and eventually will require clearance.  Thank you for the courtesy of this consult.  Will follow along closely with you.

## 2017-02-17 NOTE — PROGRESS NOTE ADULT - PROBLEM SELECTOR PLAN 2
Resolved. Possibly diverticular bleed or hemorrhoids and unclear how much of the anemia is due to GI blood loss

## 2017-02-17 NOTE — PROGRESS NOTE ADULT - SUBJECTIVE AND OBJECTIVE BOX
[facility]  MD Mayelin Weathers MD  [phone]  Answering Amanda : [phone]    [id number]    INTERVAL HPI/OVERNIGHT EVENTS:  Afebrile   Hgb 7.4 No new complaints  Vital Signs Last 24 Hrs  T(C): 37.2, Max: 37.2 (02-17 @ 06:02)  T(F): 99, Max: 99 (02-17 @ 06:02)  HR: 76 (68 - 78)  BP: 120/50 (110/54 - 122/58)  BP(mean): --  RR: 18 (18 - 20)  SpO2: 96% (94% - 96%)  ANTIBIOTICS      Allergies    adhesives (Rash)  Ceclor (Rash)  cephalosporins (Anaphylaxis)  codeine (Hives)  Demerol HCl (Hives)  penicillin (Short breath)  sulfa drugs (Hives)  Sulfac 10% (Anaphylaxis; Short breath)    Intolerances        REVIEW OF SYSTEMS:    CONSTITUTIONAL:  As per HPI.    HEENT:  Eyes:  No diplopia or blurred vision. ENT:  No earache, sore throat or runny nose.    CARDIOVASCULAR:  No pressure, squeezing, strangling, tightness, heaviness or aching about the chest, neck, axilla or epigastrium.    RESPIRATORY:  No cough, shortness of breath, PND or orthopnea.    GASTROINTESTINAL:  No nausea, vomiting or diarrhea.    GENITOURINARY:  No dysuria, frequency or urgency.    MUSCULOSKELETAL:  As per HPI.    SKIN:  No change in skin, hair or nails.    NEUROLOGIC:  CNI,MOTOR WNL, SENSORY WNL.    ENDOCRINE:  No heat or cold intolerance, polyuria or polydipsia.    HEMATOLOGICAL:  No easy bruising or bleeding.           PHYSICAL EXAMINATION:    GENERAL: The patient is a well-developed, well-nourished _____in no apparent distress. ___ is alert and oriented x3.    VITAL SIGNS:     HEENT: Head is normocephalic and atraumatic. Extraocular muscles are intact. Pupils are equal, round, and reactive to light and accommodation. Nares appeared normal. Mouth is well hydrated and without lesions. Mucous membranes are moist. Posterior pharynx clear of any exudate or lesions.    NECK: Supple. No carotid bruits.  No lymphadenopathy or thyromegaly.    LUNGS: Clear to auscultation.    HEART: Regular rate and rhythm without murmur.    ABDOMEN: Soft, nontender, and nondistended.  Positive bowel sounds.  No hepatosplenomegaly was noted.    EXTREMITIES: Without any cyanosis, clubbing, rash, lesions or edema.    NEUROLOGIC: Cranial nerves II through XII are grossly intact.    PSYCHIATRIC: Flat affect, but denies suicidal or homicidal ideations.  SKIN: No ulceration or induration present.      LABS:                        7.4    5.2   )-----------( 406      ( 17 Feb 2017 06:47 )             22.7     17 Feb 2017 06:47    136    |  98     |  16.0   ----------------------------<  93     4.7     |  28.0   |  0.85     Ca    10.8       17 Feb 2017 06:47  Phos  2.9       17 Feb 2017 06:47  Mg     2.2       17 Feb 2017 06:47                RADIOLOGY & ADDITIONAL STUDIES:          ASSESSMENT:    Anemia,FUO,? Monoclonal gammapathy    PLAN:  Bone marrow aspirates and bx performed over R post iliac spine using 1 %c lidocaine   Bone marrow aspirate sent for cultures flow cytometry      Report to follow    Mony Layne MD

## 2017-02-17 NOTE — PROGRESS NOTE ADULT - SUBJECTIVE AND OBJECTIVE BOX
BRANNON GRANT     Chief Complaint: Patient is a 76y old  Female who presents with a chief complaint of Bloody stools. (10 Feb 2017 09:36)      PAST MEDICAL & SURGICAL HISTORY:  Breast cancer: right lumpectomy RT/CT  Lung cancer: right CT/RT  GERD (gastroesophageal reflux disease)  Sinus problem  Hypothyroid  Hyperparathyroidism  Smoker  Hyperlipidemia  COPD (chronic obstructive pulmonary disease)  Anxiety  Palpitations  Atrial fibrillation: PAF  S/P lumpectomy, right breast: CT/RT  S/P partial lobectomy of lung: right      HPI/OVERNIGHT EVENTS: Patient for bone marrow biopsy today.    MEDICATIONS  (STANDING):  flecainide 50milliGRAM(s) Oral every 12 hours  simvastatin 20milliGRAM(s) Oral at bedtime  ATENolol  Tablet 25milliGRAM(s) Oral daily  cholecalciferol 1000Unit(s) Oral daily  pantoprazole    Tablet 40milliGRAM(s) Oral before breakfast  fluticasone propionate 50 MICROgram(s)/spray Nasal Spray 1Spray(s) Both Nostrils two times a day      Allergies: adhesives (Rash)  Ceclor (Rash)  cephalosporins (Anaphylaxis)  codeine (Hives)  Demerol HCl (Hives)  penicillin (Short breath)  sulfa drugs (Hives)  Sulfac 10% (Anaphylaxis; Short breath)      REVIEW OF SYSTEMS:    CONSTITUTIONAL: No fever  ENMT:  No difficulty hearing, tinnitus, vertigo; No sinus or throat pain  NECK: No pain or stiffness  RESPIRATORY: No cough, wheezing, chills or hemoptysis; No shortness of breath  CARDIOVASCULAR: No chest pain, palpitations, dizziness, or leg swelling  GASTROINTESTINAL: No abdominal or epigastric pain. No nausea, vomiting, or hematemesis; No diarrhea or constipation. No melena or hematochezia.  GENITOURINARY: No dysuria, frequency, hematuria, or incontinence  NEUROLOGICAL: No headaches, memory loss, loss of strength, numbness, or tremors  SKIN: No itching, burning, rashes, or lesions   MUSCULOSKELETAL: No joint pain or swelling; No muscle, back, or extremity pain  PSYCHIATRIC: No depression, anxiety, mood swings, or difficulty sleeping    Vital Signs Last 24 Hrs  T(C): 37.2, Max: 37.2 (02-17 @ 06:02)  T(F): 99, Max: 99 (02-17 @ 06:02)  HR: 76 (68 - 78)  BP: 120/50 (110/54 - 122/58)  BP(mean): --  RR: 18 (18 - 20)  SpO2: 96% (94% - 96%)    PHYSICAL EXAM:  Constitutional: NAD, well-groomed, well-developed  HEENT: PERRLA, EOMI, Normal Hearing, MMM  Neck: No LAD, No JVD  Back: Normal spine flexure, No CVA tenderness  Respiratory: CTAB Cardiovascular: S1 and S2, RRR, no M/G/R  Gastrointestinal: BS+, soft, NT/ND  Extremities: No peripheral edema  Vascular: 2+ peripheral pulses  Neurological: A/O x 3, no focal deficits  Psychiatric: Normal mood, normal affect  Musculoskeletal: 5/5 strength b/l upper and lower extremities  Skin: No rashes        CAPILLARY BLOOD GLUCOSE    LABS:                        7.4    5.2   )-----------( 406      ( 17 Feb 2017 06:47 )             22.7     17 Feb 2017 06:47    136    |  98     |  16.0   ----------------------------<  93     4.7     |  28.0   |  0.85     Ca    10.8       17 Feb 2017 06:47  Phos  2.9       17 Feb 2017 06:47  Mg     2.2       17 Feb 2017 06:47            RADIOLOGY & ADDITIONAL TESTS:

## 2017-02-17 NOTE — PROGRESS NOTE ADULT - SUBJECTIVE AND OBJECTIVE BOX
Pt seen and examined F/U of sigmoid stricture and blood in stool  This morning she feels well with no further diarrhea. Denies abdominal pain, nausea or vomiting. Tolerating diet. No fever for 48 hours.     REVIEW OF SYSTEMS:    CONSTITUTIONAL: No fever, weight loss, or fatigue  EYES: No eye pain, visual disturbances, or discharge  ENMT:  No difficulty hearing, tinnitus, vertigo; No sinus or throat pain  RESPIRATORY: No cough, wheezing, chills or hemoptysis; No shortness of breath  CARDIOVASCULAR: No chest pain, palpitations, dizziness, or leg swelling  GASTROINTESTINAL: No abdominal or epigastric pain. No nausea, vomiting, or hematemesis; No diarrhea or constipation. No melena or hematochezia.    MEDICATIONS:  MEDICATIONS  (STANDING):  flecainide 50milliGRAM(s) Oral every 12 hours  simvastatin 20milliGRAM(s) Oral at bedtime  ATENolol  Tablet 25milliGRAM(s) Oral daily  cholecalciferol 1000Unit(s) Oral daily  pantoprazole    Tablet 40milliGRAM(s) Oral before breakfast  fluticasone propionate 50 MICROgram(s)/spray Nasal Spray 1Spray(s) Both Nostrils two times a day    MEDICATIONS  (PRN):  acetaminophen   Tablet 650milliGRAM(s) Oral every 6 hours PRN For Temp greater than 38 C (100.4 F)      Allergies    adhesives (Rash)  Ceclor (Rash)  cephalosporins (Anaphylaxis)  codeine (Hives)  Demerol HCl (Hives)  penicillin (Short breath)  sulfa drugs (Hives)  Sulfac 10% (Anaphylaxis; Short breath)    Intolerances        Vital Signs Last 24 Hrs  T(C): 37.2, Max: 37.2 (02-17 @ 06:02)  T(F): 99, Max: 99 (02-17 @ 06:02)  HR: 76 (68 - 80)  BP: 120/50 (96/40 - 122/58)  BP(mean): --  RR: 18 (18 - 20)  SpO2: 96% (94% - 98%)    I & Os for current day (as of 02-17 @ 07:05)  =============================================  IN: 640 ml / OUT: 600 ml / NET: 40 ml      PHYSICAL EXAM:    General: Well developed; well nourished; in no acute distress  HEENT: MMM, pale conjunctiva and sclera clear  Gastrointestinal:Abdomen: Soft non-tender non-distended; Normal bowel sounds; No hepatosplenomegaly  no surgical scars.  Extremities: no cyanosis, clubbing or edema.  Skin: Warm and dry. No obvious rash    LABS:                                RADIOLOGY & ADDITIONAL STUDIES (The following images were personally reviewed):  Pt seen and examined     REVIEW OF SYSTEMS:    CONSTITUTIONAL: No fever, weight loss, or fatigue  EYES: No eye pain, visual disturbances, or discharge  ENMT:  No difficulty hearing, tinnitus, vertigo; No sinus or throat pain  RESPIRATORY: No cough, wheezing, chills or hemoptysis; No shortness of breath  CARDIOVASCULAR: No chest pain, palpitations, dizziness, or leg swelling  GASTROINTESTINAL: No abdominal or epigastric pain. No nausea, vomiting, or hematemesis; No diarrhea or constipation. No melena or hematochezia.    MEDICATIONS:  MEDICATIONS  (STANDING):  flecainide 50milliGRAM(s) Oral every 12 hours  simvastatin 20milliGRAM(s) Oral at bedtime  ATENolol  Tablet 25milliGRAM(s) Oral daily  cholecalciferol 1000Unit(s) Oral daily  pantoprazole    Tablet 40milliGRAM(s) Oral before breakfast  fluticasone propionate 50 MICROgram(s)/spray Nasal Spray 1Spray(s) Both Nostrils two times a day    MEDICATIONS  (PRN):  acetaminophen   Tablet 650milliGRAM(s) Oral every 6 hours PRN For Temp greater than 38 C (100.4 F)      Allergies    adhesives (Rash)  Ceclor (Rash)  cephalosporins (Anaphylaxis)  codeine (Hives)  Demerol HCl (Hives)  penicillin (Short breath)  sulfa drugs (Hives)  Sulfac 10% (Anaphylaxis; Short breath)    Intolerances        Vital Signs Last 24 Hrs  T(C): 37.2, Max: 37.2 (02-17 @ 06:02)  T(F): 99, Max: 99 (02-17 @ 06:02)  HR: 76 (68 - 80)  BP: 120/50 (96/40 - 122/58)  BP(mean): --  RR: 18 (18 - 20)  SpO2: 96% (94% - 98%)    I & Os for current day (as of 02-17 @ 07:05)  =============================================  IN: 640 ml / OUT: 600 ml / NET: 40 ml      PHYSICAL EXAM:    General: Well developed; well nourished; in no acute distress  HEENT: MMM, conjunctiva and sclera clear  Lungs: clear to auscultation and percussion.  Gastrointestinal:Abdomen: Soft non-tender non-distended; Normal bowel sounds; No hepatosplenomegaly  no surgical scars.  Extremities: no cyanosis, clubbing or edema.  Skin: Warm and dry. No obvious rash    LABS:                                RADIOLOGY & ADDITIONAL STUDIES (The following images were personally reviewed): Pt seen and examined F/U of sigmoid stricture and blood in stool  This morning she feels well with no further diarrhea. Denies abdominal pain, nausea or vomiting. Tolerating diet. No fever for 48 hours.     REVIEW OF SYSTEMS:    CONSTITUTIONAL: No fever, weight loss, or fatigue  EYES: No eye pain, visual disturbances, or discharge  ENMT:  No difficulty hearing, tinnitus, vertigo; No sinus or throat pain  RESPIRATORY: No cough, wheezing, chills or hemoptysis; No shortness of breath  CARDIOVASCULAR: No chest pain, palpitations, dizziness, or leg swelling  GASTROINTESTINAL: No abdominal or epigastric pain. No nausea, vomiting, or hematemesis; No diarrhea or constipation. No melena or hematochezia.    MEDICATIONS:  MEDICATIONS  (STANDING):  flecainide 50milliGRAM(s) Oral every 12 hours  simvastatin 20milliGRAM(s) Oral at bedtime  ATENolol  Tablet 25milliGRAM(s) Oral daily  cholecalciferol 1000Unit(s) Oral daily  pantoprazole    Tablet 40milliGRAM(s) Oral before breakfast  fluticasone propionate 50 MICROgram(s)/spray Nasal Spray 1Spray(s) Both Nostrils two times a day    MEDICATIONS  (PRN):  acetaminophen   Tablet 650milliGRAM(s) Oral every 6 hours PRN For Temp greater than 38 C (100.4 F)      Allergies    adhesives (Rash)  Ceclor (Rash)  cephalosporins (Anaphylaxis)  codeine (Hives)  Demerol HCl (Hives)  penicillin (Short breath)  sulfa drugs (Hives)  Sulfac 10% (Anaphylaxis; Short breath)    Intolerances        Vital Signs Last 24 Hrs  T(C): 37.2, Max: 37.2 (02-17 @ 06:02)  T(F): 99, Max: 99 (02-17 @ 06:02)  HR: 76 (68 - 80)  BP: 120/50 (96/40 - 122/58)  BP(mean): --  RR: 18 (18 - 20)  SpO2: 96% (94% - 98%)    I & Os for current day (as of 02-17 @ 07:05)  =============================================  IN: 640 ml / OUT: 600 ml / NET: 40 ml      PHYSICAL EXAM:    General: Well developed; well nourished; in no acute distress  HEENT: MMM, pale conjunctiva and sclera clear  Gastrointestinal:Abdomen: Soft non-tender non-distended; Normal bowel sounds; No hepatosplenomegaly  no surgical scars.  Extremities: no cyanosis, clubbing or edema.  Skin: Warm and dry. No obvious rash          RADIOLOGY & ADDITIONAL STUDIES (The following images were personally reviewed):  Pt seen and examined             RADIOLOGY & ADDITIONAL STUDIES (The following images were personally reviewed):  INTERPRETATION:  RADIOPHARMACEUTICAL:  0.5 mCi Indium labeled autologous   leukocytes, I.V. injected on 2/14/2017.    CLINICAL INFORMATION: 76-year-old female with fever of unknown origin, to   localize the source of infection    TECHNIQUE:  Whole-body and static images of the abdomen/pelvis were   obtained in the anterior and posterior projectionsapproximately 24 hours   after radiotracer administration. The patient refused further imaging.    COMPARISON: No prior labeled leukocyte study available.    FINDINGS: There is an abnormal focus of labeled leukocyte accumulation in   the left lung, suspicious for pneumonia. Tubular focus of activity in the   right lower quadrant of the abdomen is most suggestive of bowel activity,   probably secondary to antibiotic-related colitis. A small focus of   activity in the left wrist region may related to the site of   radiopharmaceutical injection.    IMPRESSION: Abnormal In-labeled leukocyte scan.    Findings suspicious for left lung pneumonia and antibiotic associated   colitis.    Small focus in the left wrist region may be related to injection site.   Please correlate clinically.                                                      RADIOLOGY & ADDITIONAL STUDIES (The following images were personally reviewed):

## 2017-02-17 NOTE — PROGRESS NOTE ADULT - PROBLEM SELECTOR PLAN 1
clinically resolved. Agree with current therapy. Etiology of fever unclear and will leave to ID as to any further work up but may have resolved. ? viral syndrome.

## 2017-02-17 NOTE — PROGRESS NOTE ADULT - ATTENDING COMMENTS
I called Dr Mckenzie for consult from surgery.  GI and heme input appreciated.  I spoke to Dr Orozco.  Significant anemia may require transfusion.

## 2017-02-17 NOTE — CONSULT NOTE ADULT - CONSULT REASON
anemia
Colonic Stricture/fistula
Fever of unknown origin.   Temporal artery biopsy
Rule out colovesical fistula
diverticular disease, LGIB
fever
monoclonal gammopathy of unknown significance

## 2017-02-18 LAB
CULTURE RESULTS: SIGNIFICANT CHANGE UP
CULTURE RESULTS: SIGNIFICANT CHANGE UP
NIGHT BLUE STAIN TISS: SIGNIFICANT CHANGE UP
SPECIMEN SOURCE: SIGNIFICANT CHANGE UP

## 2017-02-18 PROCEDURE — 99233 SBSQ HOSP IP/OBS HIGH 50: CPT

## 2017-02-18 RX ORDER — ACETAMINOPHEN 500 MG
650 TABLET ORAL ONCE
Qty: 0 | Refills: 0 | Status: COMPLETED | OUTPATIENT
Start: 2017-02-18 | End: 2017-02-18

## 2017-02-18 RX ADMIN — SIMVASTATIN 20 MILLIGRAM(S): 20 TABLET, FILM COATED ORAL at 22:15

## 2017-02-18 RX ADMIN — Medication 50 MILLIGRAM(S): at 18:55

## 2017-02-18 RX ADMIN — Medication 650 MILLIGRAM(S): at 00:13

## 2017-02-18 RX ADMIN — Medication 50 MILLIGRAM(S): at 05:46

## 2017-02-18 RX ADMIN — PANTOPRAZOLE SODIUM 40 MILLIGRAM(S): 20 TABLET, DELAYED RELEASE ORAL at 05:46

## 2017-02-18 RX ADMIN — Medication 650 MILLIGRAM(S): at 00:44

## 2017-02-18 RX ADMIN — Medication 1000 UNIT(S): at 18:55

## 2017-02-18 RX ADMIN — ATENOLOL 25 MILLIGRAM(S): 25 TABLET ORAL at 05:46

## 2017-02-18 NOTE — PROGRESS NOTE ADULT - SUBJECTIVE AND OBJECTIVE BOX
Rheumatology Note:    I came to see pt in consultation previous to today but she was off floor for tests.  I came to see her today--but she is declining Rheumatology consultation.    Thank you.  Myron I. Kleiner, M.D.

## 2017-02-18 NOTE — PROGRESS NOTE ADULT - PROBLEM SELECTOR PROBLEM 2
Gastrointestinal hemorrhage, unspecified gastrointestinal hemorrhage type
Gastroesophageal reflux disease without esophagitis
Gastroesophageal reflux disease without esophagitis
Gastrointestinal hemorrhage, unspecified gastrointestinal hemorrhage type
Clostridium difficile colitis
Fever due to unspecified condition
Gastrointestinal hemorrhage, unspecified gastrointestinal hemorrhage type
R/O Colovaginal fistula
Clostridium difficile diarrhea
Gastroesophageal reflux disease without esophagitis

## 2017-02-18 NOTE — PROGRESS NOTE ADULT - SUBJECTIVE AND OBJECTIVE BOX
BRANNON GRANT     Chief Complaint: Patient is a 76y old  Female who presents with a chief complaint of Bloody stools. (10 Feb 2017 09:36)      PAST MEDICAL & SURGICAL HISTORY:  Breast cancer: right lumpectomy RT/CT  Lung cancer: right CT/RT  GERD (gastroesophageal reflux disease)  Sinus problem  Hypothyroid  Hyperparathyroidism  Smoker  Hyperlipidemia  COPD (chronic obstructive pulmonary disease)  Anxiety  Palpitations  Atrial fibrillation: PAF  S/P lumpectomy, right breast: CT/RT  S/P partial lobectomy of lung: right      HPI/OVERNIGHT EVENTS: Status post transfusion    MEDICATIONS  (STANDING):  flecainide 50milliGRAM(s) Oral every 12 hours  simvastatin 20milliGRAM(s) Oral at bedtime  ATENolol  Tablet 25milliGRAM(s) Oral daily  cholecalciferol 1000Unit(s) Oral daily  pantoprazole    Tablet 40milliGRAM(s) Oral before breakfast  fluticasone propionate 50 MICROgram(s)/spray Nasal Spray 1Spray(s) Both Nostrils two times a day      Allergies: adhesives (Rash)  Ceclor (Rash)  cephalosporins (Anaphylaxis)  codeine (Hives)  Demerol HCl (Hives)  penicillin (Short breath)  sulfa drugs (Hives)  Sulfac 10% (Anaphylaxis; Short breath)      REVIEW OF SYSTEMS:    CONSTITUTIONAL: No fever  ENMT:  No difficulty hearing, tinnitus, vertigo; No sinus or throat pain  NECK: No pain or stiffness  RESPIRATORY: No cough, wheezing, chills or hemoptysis; No shortness of breath  CARDIOVASCULAR: No chest pain, palpitations, dizziness, or leg swelling  GASTROINTESTINAL: No abdominal or epigastric pain. No nausea, vomiting, or hematemesis; No diarrhea or constipation. No melena or hematochezia.  GENITOURINARY: No dysuria, frequency, hematuria, or incontinence  NEUROLOGICAL: No headaches, memory loss, loss of strength, numbness, or tremors  SKIN: No itching, burning, rashes, or lesions   MUSCULOSKELETAL: No joint pain or swelling; No muscle, back, or extremity pain  PSYCHIATRIC: No depression, anxiety, mood swings, or difficulty sleeping    Vital Signs Last 24 Hrs  T(C): 36.8, Max: 37.3 (02-17 @ 23:12)  T(F): 98.3, Max: 99.2 (02-17 @ 23:12)  HR: 66 (63 - 79)  BP: 102/60 (102/60 - 128/52)  BP(mean): --  RR: 17 (16 - 18)  SpO2: 99% (97% - 99%)    PHYSICAL EXAM:  Constitutional: NAD, well-groomed, well-developed  HEENT: PERRLA, EOMI, Normal Hearing, MMM  Neck: No LAD, No JVD  Back: Normal spine flexure, No CVA tenderness  Respiratory: CTAB Cardiovascular: S1 and S2, RRR, no M/G/R  Gastrointestinal: BS+, soft, NT/ND  Extremities: No peripheral edema  Vascular: 2+ peripheral pulses  Neurological: A/O x 3, no focal deficits  Psychiatric: Normal mood, normal affect  Musculoskeletal: 5/5 strength b/l upper and lower extremities  Skin: No rashes        CAPILLARY BLOOD GLUCOSE    LABS:                        7.4    5.2   )-----------( 406      ( 17 Feb 2017 06:47 )             22.7     17 Feb 2017 06:47    136    |  98     |  16.0   ----------------------------<  93     4.7     |  28.0   |  0.85     Ca    10.8       17 Feb 2017 06:47  Phos  2.9       17 Feb 2017 06:47  Mg     2.2       17 Feb 2017 06:47            RADIOLOGY & ADDITIONAL TESTS:

## 2017-02-18 NOTE — PROGRESS NOTE ADULT - PROBLEM SELECTOR PROBLEM 1
Clostridium difficile colitis
Fever due to unspecified condition
Fever due to unspecified condition
Clostridium difficile colitis
Clostridium difficile diarrhea
Fever due to unspecified condition
Clostridium difficile colitis
Gastrointestinal hemorrhage, unspecified gastrointestinal hemorrhage type

## 2017-02-18 NOTE — PROGRESS NOTE ADULT - SUBJECTIVE AND OBJECTIVE BOX
Resting comfortable  No distress  No abd pain  reports passing stool without obvious blood in it  Also reports passing what appears to be stool vaginally  Afebrile  VSS  Abd benign  Tolerating regular diet  Receiving second unit pRBC  Last Hgb 8.4    Although a fistulous opening has not been definitively identified if there is stool passing via vagina there must be a connection. Unfortunately it is not uncommon to be unable to identify a tract pre-op.  She also has other indications for surgery including an area of narrowing in the sigmoid colon.    Will continue to monitor

## 2017-02-18 NOTE — PROGRESS NOTE ADULT - PROBLEM SELECTOR PLAN 3
no anticoagulation due to bleeding
no anticoagulation due to bleeding
Await Indium scan results. Repeat labs ordered for the AM.
Possibly diverticular stricture but cant rule out neoplasm. Suggest elective left hemicolectomy. Will see prn your request while in hospital. F/U with Dr. Thomas as outpatient.
Recto sigmoid colon stricture.  Possible RV fistula.  Hold on any GI work up for now, pending Indium scan.
no evidence of fistula on CT scan
Tylenol PRN  Radionuclide scan ordered by ID  Consider outpatient PET/CT  Rheumatology consult
no anticoagulation due to bleeding
no evidence of fistula on CT scan

## 2017-02-18 NOTE — PROGRESS NOTE ADULT - PROBLEM SELECTOR PLAN 5
bone marrow biopsy today  status post transfusion bone marrow biopsy performed.  She is at high risk for multiple myeloma.  status post transfusion

## 2017-02-18 NOTE — PROGRESS NOTE ADULT - PROVIDER SPECIALTY LIST ADULT
Cardiology
Gastroenterology
Heme/Onc
Hospitalist
Infectious Disease
Rheumatology
Surgery
Surgery
Heme/Onc
Heme/Onc
Gastroenterology
Gastroenterology

## 2017-02-18 NOTE — PROGRESS NOTE ADULT - PROBLEM SELECTOR PROBLEM 3
R/O Colovaginal fistula
Chronic atrial fibrillation
Chronic atrial fibrillation
R/O Colovaginal fistula
Colonic stricture
Diverticulosis of large intestine without perforation or abscess
Fever due to unspecified condition
Chronic atrial fibrillation
Fever due to unspecified condition

## 2017-02-19 VITALS
RESPIRATION RATE: 14 BRPM | TEMPERATURE: 99 F | OXYGEN SATURATION: 99 % | DIASTOLIC BLOOD PRESSURE: 58 MMHG | HEART RATE: 61 BPM | SYSTOLIC BLOOD PRESSURE: 132 MMHG

## 2017-02-19 LAB
ANION GAP SERPL CALC-SCNC: 11 MMOL/L — SIGNIFICANT CHANGE UP (ref 5–17)
BUN SERPL-MCNC: 18 MG/DL — SIGNIFICANT CHANGE UP (ref 8–20)
CALCIUM SERPL-MCNC: 10.6 MG/DL — HIGH (ref 8.6–10.2)
CHLORIDE SERPL-SCNC: 99 MMOL/L — SIGNIFICANT CHANGE UP (ref 98–107)
CO2 SERPL-SCNC: 28 MMOL/L — SIGNIFICANT CHANGE UP (ref 22–29)
CREAT SERPL-MCNC: 0.82 MG/DL — SIGNIFICANT CHANGE UP (ref 0.5–1.3)
GLUCOSE SERPL-MCNC: 88 MG/DL — SIGNIFICANT CHANGE UP (ref 70–115)
HCT VFR BLD CALC: 30.8 % — LOW (ref 37–47)
HGB BLD-MCNC: 10.2 G/DL — LOW (ref 12–16)
MCHC RBC-ENTMCNC: 30.7 PG — SIGNIFICANT CHANGE UP (ref 27–31)
MCHC RBC-ENTMCNC: 33.1 G/DL — SIGNIFICANT CHANGE UP (ref 32–36)
MCV RBC AUTO: 92.8 FL — SIGNIFICANT CHANGE UP (ref 81–99)
PLATELET # BLD AUTO: 401 K/UL — HIGH (ref 150–400)
POTASSIUM SERPL-MCNC: 4.1 MMOL/L — SIGNIFICANT CHANGE UP (ref 3.5–5.3)
POTASSIUM SERPL-SCNC: 4.1 MMOL/L — SIGNIFICANT CHANGE UP (ref 3.5–5.3)
RBC # BLD: 3.32 M/UL — LOW (ref 4.4–5.2)
RBC # FLD: 14.3 % — SIGNIFICANT CHANGE UP (ref 11–15.6)
SODIUM SERPL-SCNC: 138 MMOL/L — SIGNIFICANT CHANGE UP (ref 135–145)
WBC # BLD: 4.9 K/UL — SIGNIFICANT CHANGE UP (ref 4.8–10.8)
WBC # FLD AUTO: 4.9 K/UL — SIGNIFICANT CHANGE UP (ref 4.8–10.8)

## 2017-02-19 PROCEDURE — 83550 IRON BINDING TEST: CPT

## 2017-02-19 PROCEDURE — 83521 IG LIGHT CHAINS FREE EACH: CPT

## 2017-02-19 PROCEDURE — 87040 BLOOD CULTURE FOR BACTERIA: CPT

## 2017-02-19 PROCEDURE — P9016: CPT

## 2017-02-19 PROCEDURE — 88305 TISSUE EXAM BY PATHOLOGIST: CPT

## 2017-02-19 PROCEDURE — 86334 IMMUNOFIX E-PHORESIS SERUM: CPT

## 2017-02-19 PROCEDURE — 87116 MYCOBACTERIA CULTURE: CPT

## 2017-02-19 PROCEDURE — 86038 ANTINUCLEAR ANTIBODIES: CPT

## 2017-02-19 PROCEDURE — 87186 SC STD MICRODIL/AGAR DIL: CPT

## 2017-02-19 PROCEDURE — 86850 RBC ANTIBODY SCREEN: CPT

## 2017-02-19 PROCEDURE — 99239 HOSP IP/OBS DSCHRG MGMT >30: CPT

## 2017-02-19 PROCEDURE — 83010 ASSAY OF HAPTOGLOBIN QUANT: CPT

## 2017-02-19 PROCEDURE — 86880 COOMBS TEST DIRECT: CPT

## 2017-02-19 PROCEDURE — 84145 PROCALCITONIN (PCT): CPT

## 2017-02-19 PROCEDURE — 86902 BLOOD TYPE ANTIGEN DONOR EA: CPT

## 2017-02-19 PROCEDURE — 82728 ASSAY OF FERRITIN: CPT

## 2017-02-19 PROCEDURE — 87329 GIARDIA AG IA: CPT

## 2017-02-19 PROCEDURE — 74176 CT ABD & PELVIS W/O CONTRAST: CPT

## 2017-02-19 PROCEDURE — 82378 CARCINOEMBRYONIC ANTIGEN: CPT

## 2017-02-19 PROCEDURE — 88237 TISSUE CULTURE BONE MARROW: CPT

## 2017-02-19 PROCEDURE — C8929: CPT

## 2017-02-19 PROCEDURE — 87046 STOOL CULTR AEROBIC BACT EA: CPT

## 2017-02-19 PROCEDURE — 81001 URINALYSIS AUTO W/SCOPE: CPT

## 2017-02-19 PROCEDURE — 85045 AUTOMATED RETICULOCYTE COUNT: CPT

## 2017-02-19 PROCEDURE — 87070 CULTURE OTHR SPECIMN AEROBIC: CPT

## 2017-02-19 PROCEDURE — 87086 URINE CULTURE/COLONY COUNT: CPT

## 2017-02-19 PROCEDURE — 82784 ASSAY IGA/IGD/IGG/IGM EACH: CPT

## 2017-02-19 PROCEDURE — 36415 COLL VENOUS BLD VENIPUNCTURE: CPT

## 2017-02-19 PROCEDURE — 86157 COLD AGGLUTININ TITER: CPT

## 2017-02-19 PROCEDURE — 88185 FLOWCYTOMETRY/TC ADD-ON: CPT

## 2017-02-19 PROCEDURE — 86870 RBC ANTIBODY IDENTIFICATION: CPT

## 2017-02-19 PROCEDURE — 86900 BLOOD TYPING SEROLOGIC ABO: CPT

## 2017-02-19 PROCEDURE — 36430 TRANSFUSION BLD/BLD COMPNT: CPT

## 2017-02-19 PROCEDURE — 87102 FUNGUS ISOLATION CULTURE: CPT

## 2017-02-19 PROCEDURE — 88280 CHROMOSOME KARYOTYPE STUDY: CPT

## 2017-02-19 PROCEDURE — 83615 LACTATE (LD) (LDH) ENZYME: CPT

## 2017-02-19 PROCEDURE — 86922 COMPATIBILITY TEST ANTIGLOB: CPT

## 2017-02-19 PROCEDURE — 85097 BONE MARROW INTERPRETATION: CPT

## 2017-02-19 PROCEDURE — 87045 FECES CULTURE AEROBIC BACT: CPT

## 2017-02-19 PROCEDURE — 99283 EMERGENCY DEPT VISIT LOW MDM: CPT | Mod: 25

## 2017-02-19 PROCEDURE — 85730 THROMBOPLASTIN TIME PARTIAL: CPT

## 2017-02-19 PROCEDURE — 93005 ELECTROCARDIOGRAM TRACING: CPT

## 2017-02-19 PROCEDURE — 80053 COMPREHEN METABOLIC PANEL: CPT

## 2017-02-19 PROCEDURE — 82272 OCCULT BLD FECES 1-3 TESTS: CPT

## 2017-02-19 PROCEDURE — A9570: CPT

## 2017-02-19 PROCEDURE — 99285 EMERGENCY DEPT VISIT HI MDM: CPT | Mod: 25

## 2017-02-19 PROCEDURE — 71045 X-RAY EXAM CHEST 1 VIEW: CPT

## 2017-02-19 PROCEDURE — 78802 RP LOCLZJ TUM WHBDY 1 D IMG: CPT

## 2017-02-19 PROCEDURE — 84443 ASSAY THYROID STIM HORMONE: CPT

## 2017-02-19 PROCEDURE — 87493 C DIFF AMPLIFIED PROBE: CPT

## 2017-02-19 PROCEDURE — 86901 BLOOD TYPING SEROLOGIC RH(D): CPT

## 2017-02-19 PROCEDURE — 83735 ASSAY OF MAGNESIUM: CPT

## 2017-02-19 PROCEDURE — 83540 ASSAY OF IRON: CPT

## 2017-02-19 PROCEDURE — 87177 OVA AND PARASITES SMEARS: CPT

## 2017-02-19 PROCEDURE — 84466 ASSAY OF TRANSFERRIN: CPT

## 2017-02-19 PROCEDURE — 93306 TTE W/DOPPLER COMPLETE: CPT

## 2017-02-19 PROCEDURE — 85652 RBC SED RATE AUTOMATED: CPT

## 2017-02-19 PROCEDURE — 88184 FLOWCYTOMETRY/ TC 1 MARKER: CPT

## 2017-02-19 PROCEDURE — 85610 PROTHROMBIN TIME: CPT

## 2017-02-19 PROCEDURE — 80048 BASIC METABOLIC PNL TOTAL CA: CPT

## 2017-02-19 PROCEDURE — 86431 RHEUMATOID FACTOR QUANT: CPT

## 2017-02-19 PROCEDURE — 88313 SPECIAL STAINS GROUP 2: CPT

## 2017-02-19 PROCEDURE — 84100 ASSAY OF PHOSPHORUS: CPT

## 2017-02-19 PROCEDURE — 85027 COMPLETE CBC AUTOMATED: CPT

## 2017-02-19 PROCEDURE — 86140 C-REACTIVE PROTEIN: CPT

## 2017-02-19 PROCEDURE — 88264 CHROMOSOME ANALYSIS 20-25: CPT

## 2017-02-19 PROCEDURE — 83605 ASSAY OF LACTIC ACID: CPT

## 2017-02-19 RX ORDER — ATENOLOL 25 MG/1
1 TABLET ORAL
Qty: 0 | Refills: 0 | COMMUNITY
Start: 2017-02-19

## 2017-02-19 RX ORDER — FLUTICASONE PROPIONATE 50 MCG
1 SPRAY, SUSPENSION NASAL
Qty: 0 | Refills: 0 | DISCHARGE
Start: 2017-02-19

## 2017-02-19 RX ORDER — FLECAINIDE ACETATE 50 MG
1 TABLET ORAL
Qty: 0 | Refills: 0 | COMMUNITY
Start: 2017-02-19

## 2017-02-19 RX ORDER — SIMVASTATIN 20 MG/1
1 TABLET, FILM COATED ORAL
Qty: 0 | Refills: 0 | DISCHARGE
Start: 2017-02-19

## 2017-02-19 RX ORDER — CHOLECALCIFEROL (VITAMIN D3) 125 MCG
1000 CAPSULE ORAL
Qty: 0 | Refills: 0 | COMMUNITY
Start: 2017-02-19

## 2017-02-19 RX ADMIN — PANTOPRAZOLE SODIUM 40 MILLIGRAM(S): 20 TABLET, DELAYED RELEASE ORAL at 05:03

## 2017-02-19 RX ADMIN — Medication 50 MILLIGRAM(S): at 05:03

## 2017-02-19 RX ADMIN — ATENOLOL 25 MILLIGRAM(S): 25 TABLET ORAL at 05:03

## 2017-02-21 ENCOUNTER — APPOINTMENT (OUTPATIENT)
Dept: INTERNAL MEDICINE | Facility: CLINIC | Age: 77
End: 2017-02-21

## 2017-02-21 VITALS
WEIGHT: 105 LBS | DIASTOLIC BLOOD PRESSURE: 78 MMHG | OXYGEN SATURATION: 97 % | RESPIRATION RATE: 14 BRPM | BODY MASS INDEX: 18.61 KG/M2 | SYSTOLIC BLOOD PRESSURE: 112 MMHG | HEIGHT: 63 IN | HEART RATE: 81 BPM

## 2017-02-21 DIAGNOSIS — Z23 ENCOUNTER FOR IMMUNIZATION: ICD-10-CM

## 2017-02-21 DIAGNOSIS — S52.532D COLLES' FRACTURE OF LEFT RADIUS, SUBSEQUENT ENCOUNTER FOR CLOSED FRACTURE WITH ROUTINE HEALING: ICD-10-CM

## 2017-02-21 DIAGNOSIS — Z87.09 PERSONAL HISTORY OF OTHER DISEASES OF THE RESPIRATORY SYSTEM: ICD-10-CM

## 2017-02-21 DIAGNOSIS — S52.502A UNSPECIFIED FRACTURE OF THE LOWER END OF LEFT RADIUS, INITIAL ENCOUNTER FOR CLOSED FRACTURE: ICD-10-CM

## 2017-02-22 ENCOUNTER — RESULT REVIEW (OUTPATIENT)
Age: 77
End: 2017-02-22

## 2017-02-22 LAB
ALBUMIN SERPL ELPH-MCNC: 3.2 G/DL
ALP BLD-CCNC: 124 U/L
ALT SERPL-CCNC: 44 U/L
ANION GAP SERPL CALC-SCNC: 15 MMOL/L
AST SERPL-CCNC: 33 U/L
BASOPHILS # BLD AUTO: 0.02 K/UL
BASOPHILS NFR BLD AUTO: 0.3 %
BILIRUB SERPL-MCNC: 0.4 MG/DL
BUN SERPL-MCNC: 19 MG/DL
CALCIUM SERPL-MCNC: 10.9 MG/DL
CHLORIDE SERPL-SCNC: 101 MMOL/L
CO2 SERPL-SCNC: 23 MMOL/L
CREAT SERPL-MCNC: 0.95 MG/DL
CULTURE RESULTS: SIGNIFICANT CHANGE UP
EOSINOPHIL # BLD AUTO: 0.17 K/UL
EOSINOPHIL NFR BLD AUTO: 2.7 %
GLUCOSE SERPL-MCNC: 135 MG/DL
HCT VFR BLD CALC: 31.6 %
HGB BLD-MCNC: 9.6 G/DL
IMM GRANULOCYTES NFR BLD AUTO: 0.3 %
LYMPHOCYTES # BLD AUTO: 1.16 K/UL
LYMPHOCYTES NFR BLD AUTO: 18.7 %
MAGNESIUM SERPL-MCNC: 2.1 MG/DL
MAN DIFF?: NORMAL
MCHC RBC-ENTMCNC: 29.4 PG
MCHC RBC-ENTMCNC: 30.4 GM/DL
MCV RBC AUTO: 96.6 FL
MONOCYTES # BLD AUTO: 0.67 K/UL
MONOCYTES NFR BLD AUTO: 10.8 %
NEUTROPHILS # BLD AUTO: 4.16 K/UL
NEUTROPHILS NFR BLD AUTO: 67.2 %
PLATELET # BLD AUTO: 417 K/UL
POTASSIUM SERPL-SCNC: 4.5 MMOL/L
PROT SERPL-MCNC: 6.9 G/DL
RBC # BLD: 3.27 M/UL
RBC # FLD: 14.8 %
SODIUM SERPL-SCNC: 139 MMOL/L
SPECIMEN SOURCE: SIGNIFICANT CHANGE UP
TM INTERPRETATION: SIGNIFICANT CHANGE UP
WBC # FLD AUTO: 6.2 K/UL

## 2017-03-02 ENCOUNTER — APPOINTMENT (OUTPATIENT)
Dept: INTERNAL MEDICINE | Facility: CLINIC | Age: 77
End: 2017-03-02

## 2017-03-02 VITALS
WEIGHT: 106 LBS | SYSTOLIC BLOOD PRESSURE: 114 MMHG | BODY MASS INDEX: 18.78 KG/M2 | HEART RATE: 76 BPM | RESPIRATION RATE: 14 BRPM | HEIGHT: 63 IN | DIASTOLIC BLOOD PRESSURE: 70 MMHG

## 2017-03-02 VITALS — TEMPERATURE: 98.6 F

## 2017-03-02 RX ORDER — NEOMYCIN SULFATE 500 MG/1
500 TABLET ORAL
Qty: 3 | Refills: 0 | Status: DISCONTINUED | COMMUNITY
Start: 2016-11-16

## 2017-03-02 RX ORDER — SODIUM SULFATE, POTASSIUM SULFATE, MAGNESIUM SULFATE 17.5; 3.13; 1.6 G/ML; G/ML; G/ML
17.5-3.13-1.6 SOLUTION, CONCENTRATE ORAL
Qty: 354 | Refills: 0 | Status: DISCONTINUED | COMMUNITY
Start: 2016-09-29

## 2017-03-03 ENCOUNTER — RESULT REVIEW (OUTPATIENT)
Age: 77
End: 2017-03-03

## 2017-03-07 LAB — CHROM ANALY OVERALL INTERP SPEC-IMP: SIGNIFICANT CHANGE UP

## 2017-03-09 LAB
ALBUMIN SERPL ELPH-MCNC: 3.2 G/DL
ALP BLD-CCNC: 102 U/L
ALT SERPL-CCNC: 16 U/L
ANION GAP SERPL CALC-SCNC: 14 MMOL/L
AST SERPL-CCNC: 19 U/L
BASOPHILS # BLD AUTO: 0.02 K/UL
BASOPHILS NFR BLD AUTO: 0.3 %
BILIRUB SERPL-MCNC: 0.4 MG/DL
BUN SERPL-MCNC: 19 MG/DL
CALCIUM SERPL-MCNC: 11.2 MG/DL
CHLORIDE SERPL-SCNC: 99 MMOL/L
CO2 SERPL-SCNC: 24 MMOL/L
CREAT SERPL-MCNC: 0.89 MG/DL
EOSINOPHIL # BLD AUTO: 0.18 K/UL
EOSINOPHIL NFR BLD AUTO: 2.9 %
ERYTHROCYTE [SEDIMENTATION RATE] IN BLOOD BY WESTERGREN METHOD: 71 MM/HR
GLUCOSE SERPL-MCNC: 90 MG/DL
HCT VFR BLD CALC: 30.6 %
HGB BLD-MCNC: 9.5 G/DL
IMM GRANULOCYTES NFR BLD AUTO: 0.3 %
LYMPHOCYTES # BLD AUTO: 1.54 K/UL
LYMPHOCYTES NFR BLD AUTO: 25 %
MAN DIFF?: NORMAL
MCHC RBC-ENTMCNC: 29.3 PG
MCHC RBC-ENTMCNC: 31 GM/DL
MCV RBC AUTO: 94.4 FL
MONOCYTES # BLD AUTO: 0.54 K/UL
MONOCYTES NFR BLD AUTO: 8.8 %
NEUTROPHILS # BLD AUTO: 3.85 K/UL
NEUTROPHILS NFR BLD AUTO: 62.7 %
PLATELET # BLD AUTO: 358 K/UL
POTASSIUM SERPL-SCNC: 4.5 MMOL/L
PROT SERPL-MCNC: 7.1 G/DL
RBC # BLD: 3.24 M/UL
RBC # FLD: 14.9 %
SODIUM SERPL-SCNC: 137 MMOL/L
WBC # FLD AUTO: 6.15 K/UL

## 2017-03-13 ENCOUNTER — APPOINTMENT (OUTPATIENT)
Dept: INTERNAL MEDICINE | Facility: CLINIC | Age: 77
End: 2017-03-13

## 2017-03-13 LAB
CULTURE RESULTS: SIGNIFICANT CHANGE UP
SPECIMEN SOURCE: SIGNIFICANT CHANGE UP

## 2017-03-20 ENCOUNTER — APPOINTMENT (OUTPATIENT)
Dept: INTERNAL MEDICINE | Facility: CLINIC | Age: 77
End: 2017-03-20

## 2017-03-20 VITALS
BODY MASS INDEX: 18.43 KG/M2 | HEIGHT: 63 IN | DIASTOLIC BLOOD PRESSURE: 60 MMHG | TEMPERATURE: 97.7 F | SYSTOLIC BLOOD PRESSURE: 110 MMHG | WEIGHT: 104 LBS

## 2017-03-21 LAB
ANION GAP SERPL CALC-SCNC: 12 MMOL/L
BASOPHILS # BLD AUTO: 0.04 K/UL
BASOPHILS NFR BLD AUTO: 0.8 %
BUN SERPL-MCNC: 15 MG/DL
CALCIUM SERPL-MCNC: 10.9 MG/DL
CHLORIDE SERPL-SCNC: 104 MMOL/L
CO2 SERPL-SCNC: 24 MMOL/L
CREAT SERPL-MCNC: 0.82 MG/DL
EOSINOPHIL # BLD AUTO: 0.17 K/UL
EOSINOPHIL NFR BLD AUTO: 3.3 %
ERYTHROCYTE [SEDIMENTATION RATE] IN BLOOD BY WESTERGREN METHOD: 61 MM/HR
GLUCOSE SERPL-MCNC: 77 MG/DL
HCT VFR BLD CALC: 28.2 %
HGB BLD-MCNC: 8.7 G/DL
IMM GRANULOCYTES NFR BLD AUTO: 0 %
LYMPHOCYTES # BLD AUTO: 0.96 K/UL
LYMPHOCYTES NFR BLD AUTO: 18.5 %
MAN DIFF?: NORMAL
MCHC RBC-ENTMCNC: 29 PG
MCHC RBC-ENTMCNC: 30.9 GM/DL
MCV RBC AUTO: 94 FL
MONOCYTES # BLD AUTO: 0.5 K/UL
MONOCYTES NFR BLD AUTO: 9.7 %
NEUTROPHILS # BLD AUTO: 3.51 K/UL
NEUTROPHILS NFR BLD AUTO: 67.7 %
PLATELET # BLD AUTO: 305 K/UL
POTASSIUM SERPL-SCNC: 4.2 MMOL/L
RBC # BLD: 3 M/UL
RBC # FLD: 15.6 %
SODIUM SERPL-SCNC: 140 MMOL/L
WBC # FLD AUTO: 5.18 K/UL

## 2017-03-30 ENCOUNTER — APPOINTMENT (OUTPATIENT)
Dept: INTERNAL MEDICINE | Facility: CLINIC | Age: 77
End: 2017-03-30

## 2017-04-01 LAB
CULTURE RESULTS: SIGNIFICANT CHANGE UP
SPECIMEN SOURCE: SIGNIFICANT CHANGE UP

## 2017-04-04 ENCOUNTER — APPOINTMENT (OUTPATIENT)
Dept: INTERNAL MEDICINE | Facility: CLINIC | Age: 77
End: 2017-04-04

## 2017-04-04 VITALS
SYSTOLIC BLOOD PRESSURE: 124 MMHG | WEIGHT: 104.5 LBS | DIASTOLIC BLOOD PRESSURE: 80 MMHG | HEIGHT: 63 IN | BODY MASS INDEX: 18.52 KG/M2

## 2017-04-05 ENCOUNTER — RX RENEWAL (OUTPATIENT)
Age: 77
End: 2017-04-05

## 2017-04-05 ENCOUNTER — RESULT REVIEW (OUTPATIENT)
Age: 77
End: 2017-04-05

## 2017-04-05 LAB
BASOPHILS # BLD AUTO: 0.03 K/UL
BASOPHILS NFR BLD AUTO: 0.5 %
EOSINOPHIL # BLD AUTO: 0.24 K/UL
EOSINOPHIL NFR BLD AUTO: 4.3 %
ERYTHROCYTE [SEDIMENTATION RATE] IN BLOOD BY WESTERGREN METHOD: 90 MM/HR
HCT VFR BLD CALC: 30.9 %
HGB BLD-MCNC: 9.4 G/DL
IMM GRANULOCYTES NFR BLD AUTO: 0.2 %
LYMPHOCYTES # BLD AUTO: 1.24 K/UL
LYMPHOCYTES NFR BLD AUTO: 22.3 %
MAN DIFF?: NORMAL
MCHC RBC-ENTMCNC: 28.5 PG
MCHC RBC-ENTMCNC: 30.4 GM/DL
MCV RBC AUTO: 93.6 FL
MONOCYTES # BLD AUTO: 0.43 K/UL
MONOCYTES NFR BLD AUTO: 7.7 %
NEUTROPHILS # BLD AUTO: 3.62 K/UL
NEUTROPHILS NFR BLD AUTO: 65 %
PLATELET # BLD AUTO: 341 K/UL
RBC # BLD: 3.3 M/UL
RBC # FLD: 15.9 %
WBC # FLD AUTO: 5.57 K/UL

## 2017-04-27 ENCOUNTER — APPOINTMENT (OUTPATIENT)
Dept: INTERNAL MEDICINE | Facility: CLINIC | Age: 77
End: 2017-04-27

## 2017-04-27 VITALS
RESPIRATION RATE: 14 BRPM | BODY MASS INDEX: 18.07 KG/M2 | WEIGHT: 102 LBS | DIASTOLIC BLOOD PRESSURE: 70 MMHG | SYSTOLIC BLOOD PRESSURE: 120 MMHG | HEIGHT: 63 IN | HEART RATE: 76 BPM

## 2017-04-27 DIAGNOSIS — M46.1 SACROILIITIS, NOT ELSEWHERE CLASSIFIED: ICD-10-CM

## 2017-04-27 DIAGNOSIS — B34.9 VIRAL INFECTION, UNSPECIFIED: ICD-10-CM

## 2017-04-28 ENCOUNTER — RESULT REVIEW (OUTPATIENT)
Age: 77
End: 2017-04-28

## 2017-04-28 LAB
BASOPHILS # BLD AUTO: 0.02 K/UL
BASOPHILS NFR BLD AUTO: 0.4 %
EOSINOPHIL # BLD AUTO: 0.24 K/UL
EOSINOPHIL NFR BLD AUTO: 4.3 %
ERYTHROCYTE [SEDIMENTATION RATE] IN BLOOD BY WESTERGREN METHOD: 61 MM/HR
HCT VFR BLD CALC: 31.3 %
HGB BLD-MCNC: 9.4 G/DL
IMM GRANULOCYTES NFR BLD AUTO: 0.2 %
LYMPHOCYTES # BLD AUTO: 1.21 K/UL
LYMPHOCYTES NFR BLD AUTO: 21.8 %
MAN DIFF?: NORMAL
MCHC RBC-ENTMCNC: 29.2 PG
MCHC RBC-ENTMCNC: 30 GM/DL
MCV RBC AUTO: 97.2 FL
MONOCYTES # BLD AUTO: 0.28 K/UL
MONOCYTES NFR BLD AUTO: 5 %
NEUTROPHILS # BLD AUTO: 3.8 K/UL
NEUTROPHILS NFR BLD AUTO: 68.3 %
PLATELET # BLD AUTO: 318 K/UL
RBC # BLD: 3.22 M/UL
RBC # FLD: 17.2 %
WBC # FLD AUTO: 5.56 K/UL

## 2017-05-15 ENCOUNTER — APPOINTMENT (OUTPATIENT)
Dept: INTERNAL MEDICINE | Facility: CLINIC | Age: 77
End: 2017-05-15

## 2017-05-15 VITALS
HEART RATE: 76 BPM | RESPIRATION RATE: 14 BRPM | SYSTOLIC BLOOD PRESSURE: 122 MMHG | DIASTOLIC BLOOD PRESSURE: 58 MMHG | WEIGHT: 104 LBS | BODY MASS INDEX: 18.43 KG/M2 | HEIGHT: 63 IN

## 2017-05-15 PROBLEM — B34.9 ACUTE VIRAL SYNDROME: Status: RESOLVED | Noted: 2017-01-11 | Resolved: 2017-05-15

## 2017-05-15 PROBLEM — M46.1 SACROILIITIS: Status: ACTIVE | Noted: 2017-04-27

## 2017-05-15 RX ORDER — DOXYCYCLINE HYCLATE 100 MG/1
100 TABLET ORAL TWICE DAILY
Qty: 20 | Refills: 0 | Status: DISCONTINUED | COMMUNITY
Start: 2017-01-13 | End: 2017-05-15

## 2017-05-16 ENCOUNTER — RESULT REVIEW (OUTPATIENT)
Age: 77
End: 2017-05-16

## 2017-05-16 LAB
BASOPHILS # BLD AUTO: 0.03 K/UL
BASOPHILS NFR BLD AUTO: 0.6 %
EOSINOPHIL # BLD AUTO: 0.39 K/UL
EOSINOPHIL NFR BLD AUTO: 7.2 %
ERYTHROCYTE [SEDIMENTATION RATE] IN BLOOD BY WESTERGREN METHOD: 53 MM/HR
HCT VFR BLD CALC: 32 %
HGB BLD-MCNC: 9.8 G/DL
IMM GRANULOCYTES NFR BLD AUTO: 0 %
LYMPHOCYTES # BLD AUTO: 0.93 K/UL
LYMPHOCYTES NFR BLD AUTO: 17.2 %
MAN DIFF?: NORMAL
MCHC RBC-ENTMCNC: 29.6 PG
MCHC RBC-ENTMCNC: 30.6 GM/DL
MCV RBC AUTO: 96.7 FL
MONOCYTES # BLD AUTO: 0.51 K/UL
MONOCYTES NFR BLD AUTO: 9.4 %
NEUTROPHILS # BLD AUTO: 3.55 K/UL
NEUTROPHILS NFR BLD AUTO: 65.6 %
PLATELET # BLD AUTO: 299 K/UL
RBC # BLD: 3.31 M/UL
RBC # FLD: 16.2 %
WBC # FLD AUTO: 5.41 K/UL

## 2017-05-17 ENCOUNTER — APPOINTMENT (OUTPATIENT)
Dept: INTERNAL MEDICINE | Facility: CLINIC | Age: 77
End: 2017-05-17

## 2017-06-19 ENCOUNTER — APPOINTMENT (OUTPATIENT)
Dept: PULMONOLOGY | Facility: CLINIC | Age: 77
End: 2017-06-19

## 2017-06-19 VITALS
SYSTOLIC BLOOD PRESSURE: 120 MMHG | WEIGHT: 102 LBS | OXYGEN SATURATION: 99 % | DIASTOLIC BLOOD PRESSURE: 60 MMHG | HEART RATE: 83 BPM | BODY MASS INDEX: 18.07 KG/M2 | HEIGHT: 63 IN

## 2017-06-19 RX ORDER — PREDNISONE 10 MG/1
10 TABLET ORAL
Qty: 30 | Refills: 0 | Status: DISCONTINUED | COMMUNITY
Start: 2017-03-30 | End: 2017-06-19

## 2017-06-20 ENCOUNTER — FORM ENCOUNTER (OUTPATIENT)
Age: 77
End: 2017-06-20

## 2017-06-21 ENCOUNTER — OUTPATIENT (OUTPATIENT)
Dept: OUTPATIENT SERVICES | Facility: HOSPITAL | Age: 77
LOS: 1 days | End: 2017-06-21
Payer: MEDICARE

## 2017-06-21 ENCOUNTER — APPOINTMENT (OUTPATIENT)
Dept: CT IMAGING | Facility: CLINIC | Age: 77
End: 2017-06-21

## 2017-06-21 DIAGNOSIS — Z98.89 OTHER SPECIFIED POSTPROCEDURAL STATES: Chronic | ICD-10-CM

## 2017-06-21 DIAGNOSIS — Z90.2 ACQUIRED ABSENCE OF LUNG [PART OF]: Chronic | ICD-10-CM

## 2017-06-21 DIAGNOSIS — R93.8 ABNORMAL FINDINGS ON DIAGNOSTIC IMAGING OF OTHER SPECIFIED BODY STRUCTURES: ICD-10-CM

## 2017-06-21 PROCEDURE — 71250 CT THORAX DX C-: CPT

## 2017-06-29 ENCOUNTER — APPOINTMENT (OUTPATIENT)
Dept: THORACIC SURGERY | Facility: CLINIC | Age: 77
End: 2017-06-29

## 2017-06-29 VITALS
DIASTOLIC BLOOD PRESSURE: 72 MMHG | BODY MASS INDEX: 18.07 KG/M2 | RESPIRATION RATE: 14 BRPM | HEIGHT: 63 IN | SYSTOLIC BLOOD PRESSURE: 132 MMHG | WEIGHT: 102 LBS | OXYGEN SATURATION: 99 % | HEART RATE: 73 BPM

## 2017-06-29 RX ORDER — ASPIRIN 81 MG
81 TABLET, DELAYED RELEASE (ENTERIC COATED) ORAL
Refills: 0 | Status: ACTIVE | COMMUNITY

## 2017-06-30 ENCOUNTER — APPOINTMENT (OUTPATIENT)
Dept: INTERNAL MEDICINE | Facility: CLINIC | Age: 77
End: 2017-06-30

## 2017-06-30 VITALS
SYSTOLIC BLOOD PRESSURE: 112 MMHG | WEIGHT: 102 LBS | RESPIRATION RATE: 14 BRPM | BODY MASS INDEX: 18.07 KG/M2 | HEIGHT: 63 IN | DIASTOLIC BLOOD PRESSURE: 70 MMHG | HEART RATE: 76 BPM

## 2017-06-30 DIAGNOSIS — B00.9 HERPESVIRAL INFECTION, UNSPECIFIED: ICD-10-CM

## 2017-07-05 ENCOUNTER — RX RENEWAL (OUTPATIENT)
Age: 77
End: 2017-07-05

## 2017-07-05 LAB
ALBUMIN SERPL ELPH-MCNC: 3.4 G/DL
ALP BLD-CCNC: 107 U/L
ALT SERPL-CCNC: 14 U/L
ANION GAP SERPL CALC-SCNC: 15 MMOL/L
APTT BLD: 32.7 SEC
AST SERPL-CCNC: 19 U/L
BASOPHILS # BLD AUTO: 0.04 K/UL
BASOPHILS NFR BLD AUTO: 0.8 %
BILIRUB SERPL-MCNC: 0.2 MG/DL
BUN SERPL-MCNC: 18 MG/DL
CALCIUM SERPL-MCNC: 11 MG/DL
CHLORIDE SERPL-SCNC: 102 MMOL/L
CO2 SERPL-SCNC: 25 MMOL/L
CREAT SERPL-MCNC: 0.94 MG/DL
EOSINOPHIL # BLD AUTO: 0.28 K/UL
EOSINOPHIL NFR BLD AUTO: 5.3 %
ERYTHROCYTE [SEDIMENTATION RATE] IN BLOOD BY WESTERGREN METHOD: 58 MM/HR
GLUCOSE SERPL-MCNC: 102 MG/DL
HCT VFR BLD CALC: 31.2 %
HGB BLD-MCNC: 9.6 G/DL
IMM GRANULOCYTES NFR BLD AUTO: 0.4 %
INR PPP: 1 RATIO
LYMPHOCYTES # BLD AUTO: 1.09 K/UL
LYMPHOCYTES NFR BLD AUTO: 20.7 %
MAN DIFF?: NORMAL
MCHC RBC-ENTMCNC: 29.2 PG
MCHC RBC-ENTMCNC: 30.8 GM/DL
MCV RBC AUTO: 94.8 FL
MONOCYTES # BLD AUTO: 0.51 K/UL
MONOCYTES NFR BLD AUTO: 9.7 %
NEUTROPHILS # BLD AUTO: 3.33 K/UL
NEUTROPHILS NFR BLD AUTO: 63.1 %
PLATELET # BLD AUTO: 293 K/UL
POTASSIUM SERPL-SCNC: 4.5 MMOL/L
PROT SERPL-MCNC: 7.4 G/DL
PT BLD: 11.3 SEC
RBC # BLD: 3.29 M/UL
RBC # FLD: 15.2 %
SODIUM SERPL-SCNC: 142 MMOL/L
WBC # FLD AUTO: 5.27 K/UL

## 2017-07-11 ENCOUNTER — OUTPATIENT (OUTPATIENT)
Dept: OUTPATIENT SERVICES | Facility: HOSPITAL | Age: 77
LOS: 1 days | End: 2017-07-11
Payer: MEDICARE

## 2017-07-11 ENCOUNTER — APPOINTMENT (OUTPATIENT)
Dept: THORACIC SURGERY | Facility: HOSPITAL | Age: 77
End: 2017-07-11

## 2017-07-11 VITALS
HEIGHT: 63 IN | SYSTOLIC BLOOD PRESSURE: 167 MMHG | HEART RATE: 68 BPM | DIASTOLIC BLOOD PRESSURE: 62 MMHG | RESPIRATION RATE: 16 BRPM | OXYGEN SATURATION: 100 % | TEMPERATURE: 98 F | WEIGHT: 102.07 LBS

## 2017-07-11 VITALS
DIASTOLIC BLOOD PRESSURE: 54 MMHG | OXYGEN SATURATION: 97 % | SYSTOLIC BLOOD PRESSURE: 127 MMHG | HEART RATE: 78 BPM | RESPIRATION RATE: 20 BRPM

## 2017-07-11 DIAGNOSIS — J84.9 INTERSTITIAL PULMONARY DISEASE, UNSPECIFIED: ICD-10-CM

## 2017-07-11 DIAGNOSIS — Z90.2 ACQUIRED ABSENCE OF LUNG [PART OF]: Chronic | ICD-10-CM

## 2017-07-11 DIAGNOSIS — Z98.89 OTHER SPECIFIED POSTPROCEDURAL STATES: Chronic | ICD-10-CM

## 2017-07-11 PROCEDURE — 87102 FUNGUS ISOLATION CULTURE: CPT

## 2017-07-11 PROCEDURE — 87070 CULTURE OTHR SPECIMN AEROBIC: CPT

## 2017-07-11 PROCEDURE — 31624 DX BRONCHOSCOPE/LAVAGE: CPT

## 2017-07-11 PROCEDURE — 87206 SMEAR FLUORESCENT/ACID STAI: CPT

## 2017-07-11 PROCEDURE — 87116 MYCOBACTERIA CULTURE: CPT

## 2017-07-11 PROCEDURE — 31624 DX BRONCHOSCOPE/LAVAGE: CPT | Mod: RT

## 2017-07-11 PROCEDURE — 87015 SPECIMEN INFECT AGNT CONCNTJ: CPT

## 2017-07-11 RX ORDER — FENTANYL CITRATE 50 UG/ML
25 INJECTION INTRAVENOUS
Qty: 0 | Refills: 0 | Status: DISCONTINUED | OUTPATIENT
Start: 2017-07-11 | End: 2017-07-11

## 2017-07-11 RX ORDER — SODIUM CHLORIDE 9 MG/ML
1000 INJECTION, SOLUTION INTRAVENOUS
Qty: 0 | Refills: 0 | Status: DISCONTINUED | OUTPATIENT
Start: 2017-07-11 | End: 2017-07-11

## 2017-07-11 RX ORDER — SODIUM CHLORIDE 9 MG/ML
3 INJECTION INTRAMUSCULAR; INTRAVENOUS; SUBCUTANEOUS ONCE
Qty: 0 | Refills: 0 | Status: DISCONTINUED | OUTPATIENT
Start: 2017-07-11 | End: 2017-07-11

## 2017-07-11 RX ORDER — ONDANSETRON 8 MG/1
4 TABLET, FILM COATED ORAL ONCE
Qty: 0 | Refills: 0 | Status: DISCONTINUED | OUTPATIENT
Start: 2017-07-11 | End: 2017-07-11

## 2017-07-11 NOTE — ASU DISCHARGE PLAN (ADULT/PEDIATRIC). - MEDICATION SUMMARY - MEDICATIONS TO TAKE
I will START or STAY ON the medications listed below when I get home from the hospital:    flecainide 50 mg oral tablet  -- 1 tab(s) by mouth every 12 hours  -- Indication: For Interstitial lung disease    simvastatin 20 mg oral tablet  -- 1 tab(s) by mouth once a day (at bedtime)  -- Indication: For Interstitial lung disease    ALPRAZolam 0.25 mg oral tablet  -- 1 tab(s) by mouth 3 times a day, As needed, anxiety  -- Indication: For Interstitial lung disease    atenolol 25 mg oral tablet  -- 1 tab(s) by mouth once a day  -- Indication: For Interstitial lung disease    fluticasone 50 mcg/inh nasal spray  -- 1 spray(s) into nose 2 times a day  -- Indication: For Interstitial lung disease    cholecalciferol oral tablet  -- 1000 unit(s) by mouth once a day  -- Indication: For Interstitial lung disease

## 2017-07-12 ENCOUNTER — TRANSCRIPTION ENCOUNTER (OUTPATIENT)
Age: 77
End: 2017-07-12

## 2017-07-12 LAB
GRAM STN FLD: SIGNIFICANT CHANGE UP
SPECIMEN SOURCE: SIGNIFICANT CHANGE UP

## 2017-07-13 LAB
NIGHT BLUE STAIN TISS: SIGNIFICANT CHANGE UP
SPECIMEN SOURCE: SIGNIFICANT CHANGE UP

## 2017-07-14 LAB
CULTURE RESULTS: SIGNIFICANT CHANGE UP
SPECIMEN SOURCE: SIGNIFICANT CHANGE UP

## 2017-07-25 ENCOUNTER — APPOINTMENT (OUTPATIENT)
Dept: INTERNAL MEDICINE | Facility: CLINIC | Age: 77
End: 2017-07-25

## 2017-07-25 VITALS
SYSTOLIC BLOOD PRESSURE: 118 MMHG | DIASTOLIC BLOOD PRESSURE: 72 MMHG | BODY MASS INDEX: 17.72 KG/M2 | RESPIRATION RATE: 14 BRPM | WEIGHT: 100 LBS | HEART RATE: 72 BPM | HEIGHT: 63 IN

## 2017-07-26 ENCOUNTER — RESULT REVIEW (OUTPATIENT)
Age: 77
End: 2017-07-26

## 2017-07-26 LAB
BASOPHILS # BLD AUTO: 0.03 K/UL
BASOPHILS NFR BLD AUTO: 0.5 %
CALCIUM SERPL-MCNC: 10.9 MG/DL
EOSINOPHIL # BLD AUTO: 0.21 K/UL
EOSINOPHIL NFR BLD AUTO: 3.4 %
ERYTHROCYTE [SEDIMENTATION RATE] IN BLOOD BY WESTERGREN METHOD: 59 MM/HR
HCT VFR BLD CALC: 32.7 %
HGB BLD-MCNC: 10.1 G/DL
IMM GRANULOCYTES NFR BLD AUTO: 0.2 %
LYMPHOCYTES # BLD AUTO: 0.99 K/UL
LYMPHOCYTES NFR BLD AUTO: 15.8 %
MAN DIFF?: NORMAL
MCHC RBC-ENTMCNC: 29.4 PG
MCHC RBC-ENTMCNC: 30.9 GM/DL
MCV RBC AUTO: 95.3 FL
MONOCYTES # BLD AUTO: 0.41 K/UL
MONOCYTES NFR BLD AUTO: 6.6 %
NEUTROPHILS # BLD AUTO: 4.6 K/UL
NEUTROPHILS NFR BLD AUTO: 73.5 %
PLATELET # BLD AUTO: 301 K/UL
RBC # BLD: 3.43 M/UL
RBC # FLD: 15.8 %
WBC # FLD AUTO: 6.25 K/UL

## 2017-07-31 ENCOUNTER — APPOINTMENT (OUTPATIENT)
Dept: PULMONOLOGY | Facility: CLINIC | Age: 77
End: 2017-07-31
Payer: MEDICARE

## 2017-07-31 VITALS
HEIGHT: 63 IN | HEART RATE: 74 BPM | BODY MASS INDEX: 17.89 KG/M2 | DIASTOLIC BLOOD PRESSURE: 76 MMHG | WEIGHT: 101 LBS | OXYGEN SATURATION: 99 % | SYSTOLIC BLOOD PRESSURE: 110 MMHG

## 2017-07-31 PROCEDURE — 99214 OFFICE O/P EST MOD 30 MIN: CPT

## 2017-08-01 LAB
CULTURE RESULTS: SIGNIFICANT CHANGE UP
SPECIMEN SOURCE: SIGNIFICANT CHANGE UP

## 2017-09-02 LAB
CULTURE RESULTS: SIGNIFICANT CHANGE UP
SPECIMEN SOURCE: SIGNIFICANT CHANGE UP

## 2017-09-06 ENCOUNTER — APPOINTMENT (OUTPATIENT)
Dept: INTERNAL MEDICINE | Facility: CLINIC | Age: 77
End: 2017-09-06
Payer: MEDICARE

## 2017-09-06 VITALS
RESPIRATION RATE: 14 BRPM | HEART RATE: 72 BPM | DIASTOLIC BLOOD PRESSURE: 58 MMHG | HEIGHT: 63 IN | BODY MASS INDEX: 17.89 KG/M2 | SYSTOLIC BLOOD PRESSURE: 100 MMHG | WEIGHT: 101 LBS

## 2017-09-06 DIAGNOSIS — Z01.818 ENCOUNTER FOR OTHER PREPROCEDURAL EXAMINATION: ICD-10-CM

## 2017-09-06 PROCEDURE — 99213 OFFICE O/P EST LOW 20 MIN: CPT | Mod: 25

## 2017-09-06 PROCEDURE — 36415 COLL VENOUS BLD VENIPUNCTURE: CPT

## 2017-09-07 ENCOUNTER — RESULT REVIEW (OUTPATIENT)
Age: 77
End: 2017-09-07

## 2017-09-07 LAB
ANION GAP SERPL CALC-SCNC: 14 MMOL/L
BASOPHILS # BLD AUTO: 0.04 K/UL
BASOPHILS NFR BLD AUTO: 1 %
BUN SERPL-MCNC: 16 MG/DL
CALCIUM SERPL-MCNC: 11.3 MG/DL
CHLORIDE SERPL-SCNC: 100 MMOL/L
CO2 SERPL-SCNC: 25 MMOL/L
CREAT SERPL-MCNC: 0.85 MG/DL
EOSINOPHIL # BLD AUTO: 0.29 K/UL
EOSINOPHIL NFR BLD AUTO: 7.5 %
ERYTHROCYTE [SEDIMENTATION RATE] IN BLOOD BY WESTERGREN METHOD: 73 MM/HR
GLUCOSE SERPL-MCNC: 98 MG/DL
HCT VFR BLD CALC: 33.2 %
HGB BLD-MCNC: 10.4 G/DL
IMM GRANULOCYTES NFR BLD AUTO: 0 %
LYMPHOCYTES # BLD AUTO: 1.11 K/UL
LYMPHOCYTES NFR BLD AUTO: 28.7 %
MAN DIFF?: NORMAL
MCHC RBC-ENTMCNC: 29.7 PG
MCHC RBC-ENTMCNC: 31.3 GM/DL
MCV RBC AUTO: 94.9 FL
MONOCYTES # BLD AUTO: 0.25 K/UL
MONOCYTES NFR BLD AUTO: 6.5 %
NEUTROPHILS # BLD AUTO: 2.18 K/UL
NEUTROPHILS NFR BLD AUTO: 56.3 %
PLATELET # BLD AUTO: 310 K/UL
POTASSIUM SERPL-SCNC: 4.9 MMOL/L
RBC # BLD: 3.5 M/UL
RBC # FLD: 15.8 %
SODIUM SERPL-SCNC: 139 MMOL/L
WBC # FLD AUTO: 3.87 K/UL

## 2017-09-28 ENCOUNTER — APPOINTMENT (OUTPATIENT)
Dept: PULMONOLOGY | Facility: CLINIC | Age: 77
End: 2017-09-28
Payer: MEDICARE

## 2017-09-28 ENCOUNTER — APPOINTMENT (OUTPATIENT)
Dept: PULMONOLOGY | Facility: CLINIC | Age: 77
End: 2017-09-28

## 2017-09-28 VITALS
WEIGHT: 100 LBS | HEIGHT: 63 IN | DIASTOLIC BLOOD PRESSURE: 70 MMHG | BODY MASS INDEX: 17.72 KG/M2 | OXYGEN SATURATION: 98 % | SYSTOLIC BLOOD PRESSURE: 110 MMHG | HEART RATE: 74 BPM

## 2017-09-28 DIAGNOSIS — R94.2 ABNORMAL RESULTS OF PULMONARY FUNCTION STUDIES: ICD-10-CM

## 2017-09-28 DIAGNOSIS — Z85.118 PERSONAL HISTORY OF OTHER MALIGNANT NEOPLASM OF BRONCHUS AND LUNG: ICD-10-CM

## 2017-09-28 PROCEDURE — 99214 OFFICE O/P EST MOD 30 MIN: CPT

## 2017-10-03 ENCOUNTER — RX RENEWAL (OUTPATIENT)
Age: 77
End: 2017-10-03

## 2017-10-10 ENCOUNTER — APPOINTMENT (OUTPATIENT)
Dept: INTERNAL MEDICINE | Facility: CLINIC | Age: 77
End: 2017-10-10
Payer: MEDICARE

## 2017-10-10 VITALS
HEIGHT: 63 IN | SYSTOLIC BLOOD PRESSURE: 112 MMHG | DIASTOLIC BLOOD PRESSURE: 60 MMHG | BODY MASS INDEX: 18.07 KG/M2 | HEART RATE: 76 BPM | WEIGHT: 102 LBS | RESPIRATION RATE: 14 BRPM

## 2017-10-10 PROCEDURE — 90662 IIV NO PRSV INCREASED AG IM: CPT

## 2017-10-10 PROCEDURE — 99213 OFFICE O/P EST LOW 20 MIN: CPT | Mod: 25

## 2017-10-10 PROCEDURE — G0008: CPT

## 2017-10-12 ENCOUNTER — RX RENEWAL (OUTPATIENT)
Age: 77
End: 2017-10-12

## 2017-11-08 ENCOUNTER — APPOINTMENT (OUTPATIENT)
Dept: PULMONOLOGY | Facility: CLINIC | Age: 77
End: 2017-11-08

## 2017-12-11 ENCOUNTER — APPOINTMENT (OUTPATIENT)
Dept: INTERNAL MEDICINE | Facility: CLINIC | Age: 77
End: 2017-12-11
Payer: MEDICARE

## 2017-12-11 VITALS
HEIGHT: 63 IN | BODY MASS INDEX: 17.72 KG/M2 | RESPIRATION RATE: 11 BRPM | WEIGHT: 100.01 LBS | DIASTOLIC BLOOD PRESSURE: 70 MMHG | SYSTOLIC BLOOD PRESSURE: 115 MMHG | HEART RATE: 68 BPM

## 2017-12-11 DIAGNOSIS — Z92.29 PERSONAL HISTORY OF OTHER DRUG THERAPY: ICD-10-CM

## 2017-12-11 PROCEDURE — 36415 COLL VENOUS BLD VENIPUNCTURE: CPT

## 2017-12-11 PROCEDURE — 99214 OFFICE O/P EST MOD 30 MIN: CPT | Mod: 25

## 2017-12-12 ENCOUNTER — RESULT REVIEW (OUTPATIENT)
Age: 77
End: 2017-12-12

## 2017-12-12 LAB
ALBUMIN SERPL ELPH-MCNC: 3.8 G/DL
ALP BLD-CCNC: 115 U/L
ALT SERPL-CCNC: 12 U/L
ANION GAP SERPL CALC-SCNC: 13 MMOL/L
AST SERPL-CCNC: 21 U/L
BASOPHILS # BLD AUTO: 0.04 K/UL
BASOPHILS NFR BLD AUTO: 0.9 %
BILIRUB SERPL-MCNC: 0.5 MG/DL
BUN SERPL-MCNC: 18 MG/DL
CALCIUM SERPL-MCNC: 11.3 MG/DL
CHLORIDE SERPL-SCNC: 103 MMOL/L
CHOLEST SERPL-MCNC: 151 MG/DL
CHOLEST/HDLC SERPL: 2.1 RATIO
CO2 SERPL-SCNC: 26 MMOL/L
CREAT SERPL-MCNC: 0.88 MG/DL
EOSINOPHIL # BLD AUTO: 0.2 K/UL
EOSINOPHIL NFR BLD AUTO: 4.3
ERYTHROCYTE [SEDIMENTATION RATE] IN BLOOD BY WESTERGREN METHOD: 51 MM/HR
GLUCOSE SERPL-MCNC: 88 MG/DL
HCT VFR BLD CALC: 36 %
HDLC SERPL-MCNC: 72 MG/DL
HGB BLD-MCNC: 11.3 G/DL
IMM GRANULOCYTES NFR BLD AUTO: 0.4 %
LDLC SERPL CALC-MCNC: 60 MG/DL
LYMPHOCYTES # BLD AUTO: 0.99 K/UL
LYMPHOCYTES NFR BLD AUTO: 21.2 %
MAN DIFF?: NORMAL
MCHC RBC-ENTMCNC: 30.9 PG
MCHC RBC-ENTMCNC: 31.4 GM/DL
MCV RBC AUTO: 98.4 FL
MONOCYTES # BLD AUTO: 0.39 K/UL
MONOCYTES NFR BLD AUTO: 8.4 %
NEUTROPHILS # BLD AUTO: 3.03 K/UL
NEUTROPHILS NFR BLD AUTO: 64.8 %
PLATELET # BLD AUTO: 243 K/UL
POTASSIUM SERPL-SCNC: 4.7 MMOL/L
PROT SERPL-MCNC: 7.8 G/DL
RBC # BLD: 3.66 M/UL
RBC # FLD: 14.3 %
SODIUM SERPL-SCNC: 142 MMOL/L
TRIGL SERPL-MCNC: 93 MG/DL
WBC # FLD AUTO: 4.67 K/UL

## 2018-01-06 ENCOUNTER — RX RENEWAL (OUTPATIENT)
Age: 78
End: 2018-01-06

## 2018-04-07 ENCOUNTER — EMERGENCY (EMERGENCY)
Facility: HOSPITAL | Age: 78
LOS: 1 days | Discharge: LEFT BEFORE DISCHARGE | End: 2018-04-07
Admitting: EMERGENCY MEDICINE
Payer: MEDICARE

## 2018-04-07 VITALS
SYSTOLIC BLOOD PRESSURE: 180 MMHG | HEIGHT: 60 IN | OXYGEN SATURATION: 99 % | DIASTOLIC BLOOD PRESSURE: 83 MMHG | RESPIRATION RATE: 18 BRPM | HEART RATE: 93 BPM | WEIGHT: 102.07 LBS | TEMPERATURE: 98 F

## 2018-04-07 DIAGNOSIS — Z98.89 OTHER SPECIFIED POSTPROCEDURAL STATES: Chronic | ICD-10-CM

## 2018-04-07 DIAGNOSIS — Z90.2 ACQUIRED ABSENCE OF LUNG [PART OF]: Chronic | ICD-10-CM

## 2018-04-07 PROCEDURE — 93010 ELECTROCARDIOGRAM REPORT: CPT

## 2018-04-07 PROCEDURE — 93005 ELECTROCARDIOGRAM TRACING: CPT

## 2018-04-07 NOTE — ED ADULT TRIAGE NOTE - CHIEF COMPLAINT QUOTE
Pt states "I woke up with palpitations", denies chest pain, denies difficulty breathing, pt took 325 mg Aspirin prior to arrival Pt states "I woke up with afib, felt palpitations", denies chest pain, denies difficulty breathing, pt took 325 mg Aspirin prior to arrival

## 2018-04-07 NOTE — ED ADULT NURSE NOTE - CHIEF COMPLAINT QUOTE
Pt states "I woke up with afib, felt palpitations", denies chest pain, denies difficulty breathing, pt took 325 mg Aspirin prior to arrival

## 2018-04-25 ENCOUNTER — LABORATORY RESULT (OUTPATIENT)
Age: 78
End: 2018-04-25

## 2018-04-25 ENCOUNTER — APPOINTMENT (OUTPATIENT)
Dept: INTERNAL MEDICINE | Facility: CLINIC | Age: 78
End: 2018-04-25
Payer: MEDICARE

## 2018-04-25 VITALS
SYSTOLIC BLOOD PRESSURE: 125 MMHG | BODY MASS INDEX: 18.07 KG/M2 | DIASTOLIC BLOOD PRESSURE: 80 MMHG | HEART RATE: 70 BPM | WEIGHT: 102 LBS

## 2018-04-25 PROCEDURE — 36415 COLL VENOUS BLD VENIPUNCTURE: CPT

## 2018-04-25 PROCEDURE — 99214 OFFICE O/P EST MOD 30 MIN: CPT | Mod: 25

## 2018-04-30 ENCOUNTER — RESULT REVIEW (OUTPATIENT)
Age: 78
End: 2018-04-30

## 2018-04-30 LAB
ALBUMIN SERPL ELPH-MCNC: 3.9 G/DL
ALP BLD-CCNC: 99 U/L
ALT SERPL-CCNC: 7 U/L
ANION GAP SERPL CALC-SCNC: 16 MMOL/L
AST SERPL-CCNC: 14 U/L
BASOPHILS # BLD AUTO: 0.06 K/UL
BASOPHILS NFR BLD AUTO: 1.2 %
BILIRUB SERPL-MCNC: 0.5 MG/DL
BUN SERPL-MCNC: 17 MG/DL
CALCIUM SERPL-MCNC: 11.2 MG/DL
CHLORIDE SERPL-SCNC: 101 MMOL/L
CHOLEST SERPL-MCNC: 146 MG/DL
CHOLEST/HDLC SERPL: 2.1 RATIO
CO2 SERPL-SCNC: 24 MMOL/L
CREAT SERPL-MCNC: 0.91 MG/DL
EOSINOPHIL # BLD AUTO: 0.38 K/UL
EOSINOPHIL NFR BLD AUTO: 7.4 %
ERYTHROCYTE [SEDIMENTATION RATE] IN BLOOD BY WESTERGREN METHOD: 65 MM/HR
GLUCOSE SERPL-MCNC: 90 MG/DL
HCT VFR BLD CALC: 34.3 %
HCV AB SER QL: NONREACTIVE
HCV S/CO RATIO: 0.16 S/CO
HDLC SERPL-MCNC: 71 MG/DL
HGB BLD-MCNC: 11.2 G/DL
IMM GRANULOCYTES NFR BLD AUTO: 0.4 %
LDLC SERPL CALC-MCNC: 57 MG/DL
LYMPHOCYTES # BLD AUTO: 1.25 K/UL
LYMPHOCYTES NFR BLD AUTO: 24.3 %
MAN DIFF?: NORMAL
MCHC RBC-ENTMCNC: 31.5 PG
MCHC RBC-ENTMCNC: 32.7 GM/DL
MCV RBC AUTO: 96.3 FL
MONOCYTES # BLD AUTO: 0.59 K/UL
MONOCYTES NFR BLD AUTO: 11.5 %
NEUTROPHILS # BLD AUTO: 2.85 K/UL
NEUTROPHILS NFR BLD AUTO: 55.2 %
PLATELET # BLD AUTO: 261 K/UL
POTASSIUM SERPL-SCNC: 4.6 MMOL/L
PROT SERPL-MCNC: 7.5 G/DL
RBC # BLD: 3.56 M/UL
RBC # FLD: 14.8 %
SODIUM SERPL-SCNC: 141 MMOL/L
TRIGL SERPL-MCNC: 90 MG/DL
TSH SERPL-ACNC: 2.1 UIU/ML
WBC # FLD AUTO: 5.15 K/UL

## 2018-06-22 ENCOUNTER — APPOINTMENT (OUTPATIENT)
Dept: INTERNAL MEDICINE | Facility: CLINIC | Age: 78
End: 2018-06-22
Payer: MEDICARE

## 2018-06-22 VITALS
SYSTOLIC BLOOD PRESSURE: 126 MMHG | RESPIRATION RATE: 16 BRPM | HEART RATE: 76 BPM | HEIGHT: 63 IN | DIASTOLIC BLOOD PRESSURE: 80 MMHG | WEIGHT: 101 LBS | BODY MASS INDEX: 17.89 KG/M2

## 2018-06-22 DIAGNOSIS — Z11.59 ENCOUNTER FOR SCREENING FOR OTHER VIRAL DISEASES: ICD-10-CM

## 2018-06-22 DIAGNOSIS — Z87.42 PERSONAL HISTORY OF OTHER DISEASES OF THE FEMALE GENITAL TRACT: ICD-10-CM

## 2018-06-22 DIAGNOSIS — Z01.811 ENCOUNTER FOR PREPROCEDURAL RESPIRATORY EXAMINATION: ICD-10-CM

## 2018-06-22 DIAGNOSIS — Z87.898 PERSONAL HISTORY OF OTHER SPECIFIED CONDITIONS: ICD-10-CM

## 2018-06-22 DIAGNOSIS — R93.8 ABNORMAL FINDINGS ON DIAGNOSTIC IMAGING OF OTHER SPECIFIED BODY STRUCTURES: ICD-10-CM

## 2018-06-22 PROCEDURE — 99214 OFFICE O/P EST MOD 30 MIN: CPT

## 2018-06-22 RX ORDER — DOXYCYCLINE HYCLATE 100 MG/1
100 CAPSULE ORAL
Qty: 28 | Refills: 0 | Status: DISCONTINUED | COMMUNITY
Start: 2017-12-27

## 2018-06-22 RX ORDER — BETAMETHASONE DIPROPIONATE 0.5 MG/G
0.05 CREAM TOPICAL
Qty: 45 | Refills: 0 | Status: DISCONTINUED | COMMUNITY
Start: 2018-02-06

## 2018-06-22 NOTE — REVIEW OF SYSTEMS
[Wheezing] : no wheezing [Cough] : cough [Dyspnea on Exertion] : dyspnea on exertion [Negative] : Psychiatric

## 2018-06-22 NOTE — PHYSICAL EXAM
[No Acute Distress] : no acute distress [Normal Sclera/Conjunctiva] : normal sclera/conjunctiva [No Lymphadenopathy] : no lymphadenopathy [No Respiratory Distress] : no respiratory distress  [Clear to Auscultation] : lungs were clear to auscultation bilaterally [Normal Rate] : normal rate  [Regular Rhythm] : with a regular rhythm [No Edema] : there was no peripheral edema [Soft] : abdomen soft [Non Tender] : non-tender [No Focal Deficits] : no focal deficits [Normal Affect] : the affect was normal [de-identified] : Diffuse decreased breath sounds [de-identified] : Right neck prominence which is pulsatile there for likely carotid. [de-identified] : Carotid bruit

## 2018-06-22 NOTE — ASSESSMENT
[FreeTextEntry1] : 78-year-old female who continues to smoke cigarettes despite having had lung and breast cancer with mammography being stable by CAT scan showing increased mediastinal adenopathy.\par Discussed referral to pulmonary or thoracic surgery but the patient declines.\par Recommend followup CAT scan in 4 months.\par \par Right neck prominence of what seems to be carotid artery therefore refer her for carotid duplex and neck ultrasound.\par \par Continue present medications\par \par Blood work from one month ago refused and was relatively stable\par \par Followup in 4 months.

## 2018-06-22 NOTE — DATA REVIEWED
[FreeTextEntry1] : Mammography/ultrasound= BI-RADS 2\par \par CT scan of the chest shows right upper abnormalities with thickening without change.\par Mild increase in mediastinal lymph nodes.

## 2018-08-14 NOTE — ED ADULT NURSE NOTE - PSYCHOSOCIAL WDL
Alert and oriented x 3, normal mood and affect, no apparent risk to self or others. Complex Repair And Dorsal Nasal Flap Text: The defect edges were debeveled with a #15 scalpel blade.  The primary defect was closed partially with a complex linear closure.  Given the location of the remaining defect, shape of the defect and the proximity to free margins a dorsal nasal flap was deemed most appropriate for complete closure of the defect.  Using a sterile surgical marker, an appropriate flap was drawn incorporating the defect and placing the expected incisions within the relaxed skin tension lines where possible.    The area thus outlined was incised deep to adipose tissue with a #15 scalpel blade.  The skin margins were undermined to an appropriate distance in all directions utilizing iris scissors.

## 2018-09-26 DIAGNOSIS — R92.8 OTHER ABNORMAL AND INCONCLUSIVE FINDINGS ON DIAGNOSTIC IMAGING OF BREAST: ICD-10-CM

## 2018-10-02 ENCOUNTER — APPOINTMENT (OUTPATIENT)
Dept: INTERNAL MEDICINE | Facility: CLINIC | Age: 78
End: 2018-10-02
Payer: MEDICARE

## 2018-10-02 VITALS
HEIGHT: 63 IN | WEIGHT: 107 LBS | BODY MASS INDEX: 18.96 KG/M2 | HEART RATE: 66 BPM | TEMPERATURE: 98.5 F | RESPIRATION RATE: 16 BRPM | DIASTOLIC BLOOD PRESSURE: 72 MMHG | SYSTOLIC BLOOD PRESSURE: 123 MMHG

## 2018-10-02 PROCEDURE — 36415 COLL VENOUS BLD VENIPUNCTURE: CPT

## 2018-10-02 PROCEDURE — 99214 OFFICE O/P EST MOD 30 MIN: CPT | Mod: 25

## 2018-10-02 NOTE — HISTORY OF PRESENT ILLNESS
[FreeTextEntry8] : 78-year-old female with history of lung and breast cancer, who continues to smoke cigarettes, presents with one week of fever up to 101.5°.\par The patient does have a history of an FUO for the past 1-1/2 years with extensive workup, including infection, disease evaluation with no clear etiology.\par Up until the past week. She states that infrequently. She would get a low-grade temperature.\par \par Now she's got a higher temperature old. No specific symptoms.\par The patient denies sinus pressure, sore throat, earaches, abdominal pain,dysuria nausea, vomiting, diarrhea, rash, recent travel or any other specific symptoms.\par \par She does state that occasionally she will cough and the mucus. Will have some blood.

## 2018-10-02 NOTE — PHYSICAL EXAM
[No Acute Distress] : no acute distress [Normal Sclera/Conjunctiva] : normal sclera/conjunctiva [Normal Outer Ear/Nose] : the outer ears and nose were normal in appearance [Normal TMs] : both tympanic membranes were normal [No Lymphadenopathy] : no lymphadenopathy [No Respiratory Distress] : no respiratory distress  [Clear to Auscultation] : lungs were clear to auscultation bilaterally [No Accessory Muscle Use] : no accessory muscle use [Normal Rate] : normal rate  [Regular Rhythm] : with a regular rhythm [No Edema] : there was no peripheral edema [Soft] : abdomen soft [Non Tender] : non-tender [Non-distended] : non-distended [No HSM] : no HSM [Normal Supraclavicular Nodes] : no supraclavicular lymphadenopathy [Normal Anterior Cervical Nodes] : no anterior cervical lymphadenopathy [No Focal Deficits] : no focal deficits [Normal Affect] : the affect was normal [de-identified] : Not ill-appearin [de-identified] : Diffuse decreased breath sounds

## 2018-10-02 NOTE — ASSESSMENT
[FreeTextEntry1] : 78-year-old female with history of FUO, now presents with fever of 101.5° for about one week.\par No localizing symptoms.\par Unclear etiology.\par \par Plan his blood work including chemistries, CBC, and blood cultures.\par Chest x-ray.\par Patient is to report any new symptoms.

## 2018-10-09 ENCOUNTER — RX RENEWAL (OUTPATIENT)
Age: 78
End: 2018-10-09

## 2018-10-09 LAB
ALBUMIN SERPL ELPH-MCNC: 3.9 G/DL
ALP BLD-CCNC: 114 U/L
ALT SERPL-CCNC: 20 U/L
ANION GAP SERPL CALC-SCNC: 11 MMOL/L
AST SERPL-CCNC: 28 U/L
BACTERIA BLD CULT: NORMAL
BACTERIA BLD CULT: NORMAL
BASOPHILS # BLD AUTO: 0.02 K/UL
BASOPHILS NFR BLD AUTO: 0.5 %
BILIRUB SERPL-MCNC: 0.3 MG/DL
BUN SERPL-MCNC: 17 MG/DL
CALCIUM SERPL-MCNC: 10.9 MG/DL
CHLORIDE SERPL-SCNC: 101 MMOL/L
CO2 SERPL-SCNC: 27 MMOL/L
CREAT SERPL-MCNC: 0.98 MG/DL
EOSINOPHIL # BLD AUTO: 0.15 K/UL
EOSINOPHIL NFR BLD AUTO: 3.5 %
GLUCOSE SERPL-MCNC: 89 MG/DL
HCT VFR BLD CALC: 33 %
HGB BLD-MCNC: 10.1 G/DL
IMM GRANULOCYTES NFR BLD AUTO: 0 %
LYMPHOCYTES # BLD AUTO: 0.54 K/UL
LYMPHOCYTES NFR BLD AUTO: 12.5 %
MAN DIFF?: NORMAL
MCHC RBC-ENTMCNC: 29.3 PG
MCHC RBC-ENTMCNC: 30.6 GM/DL
MCV RBC AUTO: 95.7 FL
MONOCYTES # BLD AUTO: 0.21 K/UL
MONOCYTES NFR BLD AUTO: 4.9 %
NEUTROPHILS # BLD AUTO: 3.4 K/UL
NEUTROPHILS NFR BLD AUTO: 78.6 %
PLATELET # BLD AUTO: 243 K/UL
POTASSIUM SERPL-SCNC: 4.6 MMOL/L
PROT SERPL-MCNC: 7.3 G/DL
RBC # BLD: 3.45 M/UL
RBC # FLD: 14.6 %
SODIUM SERPL-SCNC: 139 MMOL/L
T4 FREE SERPL-MCNC: 1.2 NG/DL
TSH SERPL-ACNC: 2.45 UIU/ML
WBC # FLD AUTO: 4.32 K/UL

## 2018-10-18 ENCOUNTER — APPOINTMENT (OUTPATIENT)
Dept: INTERNAL MEDICINE | Facility: CLINIC | Age: 78
End: 2018-10-18
Payer: MEDICARE

## 2018-10-18 PROCEDURE — G0008: CPT

## 2018-10-18 PROCEDURE — 90662 IIV NO PRSV INCREASED AG IM: CPT

## 2018-12-07 NOTE — ED ADULT TRIAGE NOTE - ESI TRIAGE ACUITY LEVEL, MLM
3 continue meds continue meds will be on comfort Continue Sinemet. Continue Sinemet. Continue Sinemet. continue meds will be on comfort continue meds Continue Sinemet.

## 2019-01-07 ENCOUNTER — RX RENEWAL (OUTPATIENT)
Age: 79
End: 2019-01-07

## 2019-03-18 ENCOUNTER — APPOINTMENT (OUTPATIENT)
Dept: INTERNAL MEDICINE | Facility: CLINIC | Age: 79
End: 2019-03-18
Payer: MEDICARE

## 2019-03-18 VITALS
DIASTOLIC BLOOD PRESSURE: 70 MMHG | HEART RATE: 70 BPM | SYSTOLIC BLOOD PRESSURE: 115 MMHG | WEIGHT: 105 LBS | RESPIRATION RATE: 16 BRPM | HEIGHT: 63 IN | BODY MASS INDEX: 18.61 KG/M2

## 2019-03-18 DIAGNOSIS — N63.10 UNSPECIFIED LUMP IN THE RIGHT BREAST, UNSPECIFIED QUADRANT: ICD-10-CM

## 2019-03-18 PROCEDURE — 36415 COLL VENOUS BLD VENIPUNCTURE: CPT

## 2019-03-18 PROCEDURE — 99214 OFFICE O/P EST MOD 30 MIN: CPT | Mod: 25

## 2019-03-18 NOTE — REVIEW OF SYSTEMS
[Cough] : cough [Dyspnea on Exertion] : dyspnea on exertion [Negative] : Psychiatric [Wheezing] : no wheezing

## 2019-03-18 NOTE — HISTORY OF PRESENT ILLNESS
[de-identified] : 78-year-old female who continues to smoke cigarettes with history of lung cancer and breast cancer returns for her followup visit.\par \par Patient states she is generally feeling okay. She has had fevers for some time and was diagnosed with an FUO ultimately with no specific diagnosis.\par She did have chronic sinus issues and also what was felt to be a potential colovaginal fistula both of which are not as problematic.\par She states she still occasionally gets fevers up to 101°. No new symptoms.\par \par She had had right neck swelling for which she saw ENT with diagnosis of parotiditis which has resolved.\par \par Previous carotid ultrasound had shown a questionable abnormality right carotid area for which she was recommended to see vascular which she did not do.\par \par The patient has a chronic cough with chronic sputum production. No increased shortness of breath.\par No chest pain, increased shortness of breath or palpitations.\par \par Most recent breast imaging showed questionable abnormality in the left breast for which she had ultrasound about 6 months ago which was stable.\par MRI unable to be done secondary to pacemaker.\par  [FreeTextEntry1] : Followup cardiac arrhythmia, lung cancer, breast cancer

## 2019-03-18 NOTE — ASSESSMENT
[FreeTextEntry1] : 79-year-old female who continues to smoke cigarettes despite having had lung and breast cancer with mammography being stable by CAT scan showing increased mediastinal adenopathy.\par Discussed referral to pulmonary or thoracic surgery but the patient declines.\par \par Continued low-grade fevers of questionable etiology\par Again discussed potential next steps such as followup ID evaluation and PET scan which the patient declines.\par \par Abnormality left breast therefore referred for followup ultrasound left breast\par \par Questionable abnormality right carotid nipple carotid ultrasound ordered.\par \par Continue present medications\par \par Followup in 4 months.

## 2019-03-18 NOTE — PHYSICAL EXAM
[No Acute Distress] : no acute distress [Normal Sclera/Conjunctiva] : normal sclera/conjunctiva [No Lymphadenopathy] : no lymphadenopathy [Clear to Auscultation] : lungs were clear to auscultation bilaterally [No Respiratory Distress] : no respiratory distress  [Regular Rhythm] : with a regular rhythm [Normal Rate] : normal rate  [Soft] : abdomen soft [Non Tender] : non-tender [No Edema] : there was no peripheral edema [Normal Affect] : the affect was normal [No Focal Deficits] : no focal deficits [de-identified] : Right neck prominence which is pulsatile there for likely carotid. [de-identified] : Diffuse decreased breath sounds [de-identified] : Left breast with cystic nodule at 2:00 near nipple

## 2019-03-20 ENCOUNTER — RESULT REVIEW (OUTPATIENT)
Age: 79
End: 2019-03-20

## 2019-03-20 LAB
ALBUMIN SERPL ELPH-MCNC: 4 G/DL
ALP BLD-CCNC: 106 U/L
ALT SERPL-CCNC: 8 U/L
ANION GAP SERPL CALC-SCNC: 14 MMOL/L
AST SERPL-CCNC: 15 U/L
BASOPHILS # BLD AUTO: 0.04 K/UL
BASOPHILS NFR BLD AUTO: 0.6 %
BILIRUB SERPL-MCNC: 0.3 MG/DL
BUN SERPL-MCNC: 15 MG/DL
CALCIUM SERPL-MCNC: 11.3 MG/DL
CHLORIDE SERPL-SCNC: 102 MMOL/L
CHOLEST SERPL-MCNC: 133 MG/DL
CHOLEST/HDLC SERPL: 2.3 RATIO
CO2 SERPL-SCNC: 25 MMOL/L
CREAT SERPL-MCNC: 0.95 MG/DL
EOSINOPHIL # BLD AUTO: 0.35 K/UL
EOSINOPHIL NFR BLD AUTO: 5.3 %
GLUCOSE SERPL-MCNC: 85 MG/DL
HCT VFR BLD CALC: 34.1 %
HDLC SERPL-MCNC: 58 MG/DL
HGB BLD-MCNC: 10.2 G/DL
IMM GRANULOCYTES NFR BLD AUTO: 0.2 %
LDLC SERPL CALC-MCNC: 55 MG/DL
LYMPHOCYTES # BLD AUTO: 0.99 K/UL
LYMPHOCYTES NFR BLD AUTO: 14.9 %
MAN DIFF?: NORMAL
MCHC RBC-ENTMCNC: 29.7 PG
MCHC RBC-ENTMCNC: 29.9 GM/DL
MCV RBC AUTO: 99.1 FL
MONOCYTES # BLD AUTO: 0.45 K/UL
MONOCYTES NFR BLD AUTO: 6.8 %
NEUTROPHILS # BLD AUTO: 4.82 K/UL
NEUTROPHILS NFR BLD AUTO: 72.2 %
PLATELET # BLD AUTO: 283 K/UL
POTASSIUM SERPL-SCNC: 4.9 MMOL/L
PROT SERPL-MCNC: 7.5 G/DL
RBC # BLD: 3.44 M/UL
RBC # FLD: 14.8 %
SODIUM SERPL-SCNC: 141 MMOL/L
TRIGL SERPL-MCNC: 98 MG/DL
WBC # FLD AUTO: 6.66 K/UL

## 2019-04-03 ENCOUNTER — RESULT REVIEW (OUTPATIENT)
Age: 79
End: 2019-04-03

## 2019-04-08 ENCOUNTER — RX RENEWAL (OUTPATIENT)
Age: 79
End: 2019-04-08

## 2019-06-18 ENCOUNTER — APPOINTMENT (OUTPATIENT)
Dept: INTERNAL MEDICINE | Facility: CLINIC | Age: 79
End: 2019-06-18
Payer: MEDICARE

## 2019-06-18 VITALS
BODY MASS INDEX: 18.61 KG/M2 | HEIGHT: 63 IN | OXYGEN SATURATION: 94 % | RESPIRATION RATE: 16 BRPM | SYSTOLIC BLOOD PRESSURE: 140 MMHG | WEIGHT: 105 LBS | TEMPERATURE: 98.1 F | HEART RATE: 65 BPM | DIASTOLIC BLOOD PRESSURE: 80 MMHG

## 2019-06-18 PROCEDURE — 99213 OFFICE O/P EST LOW 20 MIN: CPT | Mod: 25

## 2019-06-18 PROCEDURE — 36415 COLL VENOUS BLD VENIPUNCTURE: CPT

## 2019-06-18 NOTE — PHYSICAL EXAM
[No Acute Distress] : no acute distress [Normal Outer Ear/Nose] : the outer ears and nose were normal in appearance [Normal Sclera/Conjunctiva] : normal sclera/conjunctiva [Normal Oropharynx] : the oropharynx was normal [No Lymphadenopathy] : no lymphadenopathy [No Respiratory Distress] : no respiratory distress  [Normal TMs] : both tympanic membranes were normal [Soft] : abdomen soft [No Edema] : there was no peripheral edema [Non Tender] : non-tender [No HSM] : no HSM [Non-distended] : non-distended [No Masses] : no abdominal mass palpated [No Focal Deficits] : no focal deficits [No Rash] : no rash [Normal Affect] : the affect was normal [de-identified] : Diffuse decreased breath sounds

## 2019-06-18 NOTE — HISTORY OF PRESENT ILLNESS
[FreeTextEntry8] : 79-year-old female with history of breast and lung cancer with recent evaluation showing no evidence of disease who has had a chronic low-grade fever for about 2 years with no etiology found with extensive evaluations present secondary to 2 weeks of more persistent fever.\par Her feet is always low grade about 100° and usually she gets him twice a week. A\par Present stating that for the past 2 weeks she's been getting them daily. Temperatures range from 99.5-100.8°\par \par Patient denies any new symptoms including change in cough, sinus symptoms, shortness of breath, abdominal pain, nausea, vomiting, diarrhea, rash, recent travel or sick exposures.\par \par Overall she states she is feeling well but more frequent low-grade fevers

## 2019-06-18 NOTE — ASSESSMENT
[FreeTextEntry1] : Patient with chronic low-grade fever for about 2 years with no etiology ever found which usually occurs about 2 days a week but now has been daily for about 2 weeks with no localizing symptoms area\par \par Plan is to check CBC, CMP, UA with culture and sputum culture.\par Also patient sent for chest x-ray.\par \par Patient instructed to call with any new or change in symptoms.

## 2019-06-24 LAB
ALBUMIN SERPL ELPH-MCNC: 4 G/DL
ALP BLD-CCNC: 107 U/L
ALT SERPL-CCNC: 9 U/L
ANION GAP SERPL CALC-SCNC: 12 MMOL/L
APPEARANCE: CLEAR
AST SERPL-CCNC: 14 U/L
BACTERIA SPT CULT: NORMAL
BACTERIA UR CULT: ABNORMAL
BACTERIA: NEGATIVE
BASOPHILS # BLD AUTO: 0.06 K/UL
BASOPHILS NFR BLD AUTO: 1 %
BILIRUB SERPL-MCNC: 0.3 MG/DL
BILIRUBIN URINE: NEGATIVE
BLOOD URINE: ABNORMAL
BUN SERPL-MCNC: 19 MG/DL
CALCIUM OXALATE CRYSTALS: ABNORMAL
CALCIUM SERPL-MCNC: 11.3 MG/DL
CHLORIDE SERPL-SCNC: 101 MMOL/L
CO2 SERPL-SCNC: 26 MMOL/L
COLOR: YELLOW
CREAT SERPL-MCNC: 0.92 MG/DL
EOSINOPHIL # BLD AUTO: 0.3 K/UL
EOSINOPHIL NFR BLD AUTO: 4.9 %
FERRITIN SERPL-MCNC: 57 NG/ML
FOLATE SERPL-MCNC: 17.4 NG/ML
GLUCOSE QUALITATIVE U: NEGATIVE
GLUCOSE SERPL-MCNC: 84 MG/DL
HCT VFR BLD CALC: 31.5 %
HGB BLD-MCNC: 9.5 G/DL
HYALINE CASTS: 2 /LPF
IMM GRANULOCYTES NFR BLD AUTO: 0.3 %
IRON SATN MFR SERPL: 7 %
IRON SERPL-MCNC: 23 UG/DL
KETONES URINE: NEGATIVE
LEUKOCYTE ESTERASE URINE: ABNORMAL
LYMPHOCYTES # BLD AUTO: 1.15 K/UL
LYMPHOCYTES NFR BLD AUTO: 18.7 %
MAN DIFF?: NORMAL
MCHC RBC-ENTMCNC: 28.7 PG
MCHC RBC-ENTMCNC: 30.2 GM/DL
MCV RBC AUTO: 95.2 FL
MICROSCOPIC-UA: NORMAL
MONOCYTES # BLD AUTO: 0.51 K/UL
MONOCYTES NFR BLD AUTO: 8.3 %
NEUTROPHILS # BLD AUTO: 4.11 K/UL
NEUTROPHILS NFR BLD AUTO: 66.8 %
NITRITE URINE: NEGATIVE
PH URINE: 6
PLATELET # BLD AUTO: 317 K/UL
POTASSIUM SERPL-SCNC: 4.6 MMOL/L
PROT SERPL-MCNC: 7.5 G/DL
PROTEIN URINE: ABNORMAL
RBC # BLD: 3.31 M/UL
RBC # FLD: 14.5 %
RED BLOOD CELLS URINE: 2 /HPF
SODIUM SERPL-SCNC: 139 MMOL/L
SPECIFIC GRAVITY URINE: 1.02
SQUAMOUS EPITHELIAL CELLS: 10 /HPF
TIBC SERPL-MCNC: 313 UG/DL
UIBC SERPL-MCNC: 290 UG/DL
UROBILINOGEN URINE: NORMAL
VIT B12 SERPL-MCNC: 392 PG/ML
WBC # FLD AUTO: 6.15 K/UL
WHITE BLOOD CELLS URINE: 20 /HPF

## 2019-07-02 ENCOUNTER — RESULT REVIEW (OUTPATIENT)
Age: 79
End: 2019-07-02

## 2019-07-08 ENCOUNTER — RX RENEWAL (OUTPATIENT)
Age: 79
End: 2019-07-08

## 2019-09-16 ENCOUNTER — RX RENEWAL (OUTPATIENT)
Age: 79
End: 2019-09-16

## 2019-09-16 ENCOUNTER — MEDICATION RENEWAL (OUTPATIENT)
Age: 79
End: 2019-09-16

## 2019-10-05 NOTE — ED ADULT NURSE NOTE - THOUGHTS OF SUICIDE/SELF-HARM YN, MLM
10/05/19 0900   C-SSRS (Frequent Screen)   2. Have you actually had any thoughts of killing yourself? Yes   3. Have you been thinking about how you might do this? No   4. Have you had these thoughts and had some intention of acting on them? No   5. Have you started to work out or worked out the details of how to kill yourself? Do you intend to carry out this plan?  No   6. Have you done anything, started to do anything, or prepared to do anything to end your life? No   Suicide Evaluation Low Risk     Nursing Suicide Assessment Note - Inpatient    Current assessment:    Current C-SSRS score: Low Risk      Protective Factors / Reason for Living: (NONE  )    Interventions:   · Implemented the Safety / Recovery Plan    Other Interventions Implemented:  · per unit standard   No

## 2019-10-09 ENCOUNTER — APPOINTMENT (OUTPATIENT)
Dept: INTERNAL MEDICINE | Facility: CLINIC | Age: 79
End: 2019-10-09
Payer: MEDICARE

## 2019-10-09 PROCEDURE — 90653 IIV ADJUVANT VACCINE IM: CPT

## 2019-10-09 PROCEDURE — G0008: CPT

## 2020-07-09 NOTE — ED ADULT NURSE NOTE - GENITOURINARY WDL
Detail Level: Zone Render In Strict Bullet Format?: No Otc Regimen: Sarna Anti Itch lotion QD Otc Regimen: GoldBond powder QD Initiate Treatment: Econazole 1% cream BID to under breast and groin Bladder non-tender and non-distended. Urine clear yellow.

## 2020-07-31 ENCOUNTER — APPOINTMENT (OUTPATIENT)
Dept: INTERNAL MEDICINE | Facility: CLINIC | Age: 80
End: 2020-07-31
Payer: MEDICARE

## 2020-07-31 VITALS
HEIGHT: 63 IN | TEMPERATURE: 97.4 F | HEART RATE: 68 BPM | BODY MASS INDEX: 17.72 KG/M2 | SYSTOLIC BLOOD PRESSURE: 110 MMHG | RESPIRATION RATE: 18 BRPM | DIASTOLIC BLOOD PRESSURE: 60 MMHG | WEIGHT: 100 LBS

## 2020-07-31 PROCEDURE — G0439: CPT

## 2020-07-31 PROCEDURE — 36415 COLL VENOUS BLD VENIPUNCTURE: CPT

## 2020-07-31 PROCEDURE — G0442 ANNUAL ALCOHOL SCREEN 15 MIN: CPT | Mod: 59

## 2020-07-31 NOTE — HISTORY OF PRESENT ILLNESS
[FreeTextEntry1] : 80-year-old female presents for her yearly physical with history of arrhythmia with includes atrial fibrillation remains in normal sinus rhythm on flecainide and atenolol.Patient has loop recorder.\par She remains on aspirin 81 mg daily.\par \par  Patient's past history includes lung cancer and breast cancer with testing July 2019 showing no evidence of disease. Patient continues to smoke cigarettes 1-1-1/2 packs per day and declines any suggestion of decreasing or quitting.\par \par Patient generally feeling well without chest pain or shortness of breath.\par Her main issue is the stress of taking care of her  who has become more completely dependent.\par \par Patient has had an FUO for about 4 years with no specific etiology the possibilities including chronic sinusitis and colovaginal fistula for which she has seen GYN, general surgery and infectious disease.\par The patient states she still gets low grade fevers about twice a week at 99.2 and she takes Tylenol. She declines to not take Tylenol and see if the temperature goes higher.

## 2020-07-31 NOTE — ASSESSMENT
[FreeTextEntry1] : 80-year-old female with controlled atrial fibrillation on medication without evidence of cardiovascular decompensation. Loop recorder is present to detect any episodes of A. fib.\par History of breast and lung cancer with no evidence of disease. Patient continues to smoke cigarettes with no desire for cessation.\par \par Mammography July 2019 with followup scheduled\par Bone density October 2015\par Colonoscopy May 2008. \par CAT scan of the chest July 2019 with followup scheduled\par Carotid duplex April 2019 with plaque without stenosis\par \par Vaccines up to date\par venipunctures done today in the office\par Followup in 6 months

## 2020-07-31 NOTE — HEALTH RISK ASSESSMENT
[Good] : ~his/her~  mood as  good [Fair] : ~his/her~ current health as fair  [] : Yes [No falls in past year] : Patient reported no falls in the past year [No] : In the past 12 months have you used drugs other than those required for medical reasons? No [0] : 2) Feeling down, depressed, or hopeless: Not at all (0) [None] : None [With Significant Other] : lives with significant other [College] : College [Retired] : retired [] :  [Reports changes in hearing] : Reports changes in hearing [Smoke Detector] : smoke detector [Seat Belt] :  uses seat belt [Carbon Monoxide Detector] : carbon monoxide detector [Designated Healthcare Proxy] : Designated healthcare proxy [Sunscreen] : uses sunscreen [Name: ___] : Health Care Proxy's Name: [unfilled]  [de-identified] : active [[id number]] : 0 [de-identified] : good [Reports changes in dental health] : Reports no changes in dental health [Change in mental status noted] : No change in mental status noted [de-identified] : Hearing aids [AdvancecareDate] : 07/20

## 2020-07-31 NOTE — PHYSICAL EXAM
[General Appearance - Alert] : alert [General Appearance - In No Acute Distress] : in no acute distress [Sclera] : the sclera and conjunctiva were normal [PERRL With Normal Accommodation] : pupils were equal in size, round, and reactive to light [Outer Ear] : the ears and nose were normal in appearance [Extraocular Movements] : extraocular movements were intact [Oropharynx] : the oropharynx was normal [Neck Appearance] : the appearance of the neck was normal [Neck Cervical Mass (___cm)] : no neck mass was observed [Thyroid Diffuse Enlargement] : the thyroid was not enlarged [Jugular Venous Distention Increased] : there was no jugular-venous distention [Thyroid Nodule] : there were no palpable thyroid nodules [Auscultation Breath Sounds / Voice Sounds] : lungs were clear to auscultation bilaterally [Heart Sounds] : normal S1 and S2 [Heart Rate And Rhythm] : heart rate was normal and rhythm regular [Heart Sounds Gallop] : no gallops [Murmurs] : no murmurs [Heart Sounds Pericardial Friction Rub] : no pericardial rub [Arterial Pulses Carotid] : carotid pulses were normal with no bruits [Abdominal Aorta] : the abdominal aorta was normal [Full Pulse] : the pedal pulses are present [Edema] : there was no peripheral edema [Veins - Varicosity Changes] : there were no varicosital changes [Breast Palpation Mass] : no palpable masses [Breast Abnormal Lactation (Galactorrhea)] : no nipple discharge [Bowel Sounds] : normal bowel sounds [Abdomen Soft] : soft [Abdomen Tenderness] : non-tender [Abdomen Mass (___ Cm)] : no abdominal mass palpated [Cervical Lymph Nodes Enlarged Posterior Bilaterally] : posterior cervical [Cervical Lymph Nodes Enlarged Anterior Bilaterally] : anterior cervical [Supraclavicular Lymph Nodes Enlarged Bilaterally] : supraclavicular [Axillary Lymph Nodes Enlarged Bilaterally] : axillary [Femoral Lymph Nodes Enlarged Bilaterally] : femoral [Inguinal Lymph Nodes Enlarged Bilaterally] : inguinal [No Spinal Tenderness] : no spinal tenderness [Abnormal Walk] : normal gait [Nail Clubbing] : no clubbing  or cyanosis of the fingernails [Musculoskeletal - Swelling] : no joint swelling seen [Motor Tone] : muscle strength and tone were normal [Skin Turgor] : normal skin turgor [Skin Color & Pigmentation] : normal skin color and pigmentation [No Focal Deficits] : no focal deficits [] : no rash [Oriented To Time, Place, And Person] : oriented to person, place, and time [Impaired Insight] : insight and judgment were intact [Affect] : the affect was normal [FreeTextEntry1] : Positive cast on left arm from hand to lower upper arm

## 2020-08-04 LAB
25(OH)D3 SERPL-MCNC: 41.8 NG/ML
ALBUMIN SERPL ELPH-MCNC: 4.1 G/DL
ALP BLD-CCNC: 101 U/L
ALT SERPL-CCNC: 7 U/L
ANION GAP SERPL CALC-SCNC: 12 MMOL/L
APPEARANCE: ABNORMAL
AST SERPL-CCNC: 13 U/L
BACTERIA: ABNORMAL
BASOPHILS # BLD AUTO: 0.05 K/UL
BASOPHILS NFR BLD AUTO: 0.8 %
BILIRUB SERPL-MCNC: 0.3 MG/DL
BILIRUBIN URINE: NEGATIVE
BLOOD URINE: NORMAL
BUN SERPL-MCNC: 18 MG/DL
CALCIUM SERPL-MCNC: 11.2 MG/DL
CANCER AG27-29 SERPL-ACNC: 12 U/ML
CHLORIDE SERPL-SCNC: 102 MMOL/L
CHOLEST SERPL-MCNC: 130 MG/DL
CHOLEST/HDLC SERPL: 2.1 RATIO
CO2 SERPL-SCNC: 25 MMOL/L
COLOR: YELLOW
CREAT SERPL-MCNC: 0.95 MG/DL
EOSINOPHIL # BLD AUTO: 0.22 K/UL
EOSINOPHIL NFR BLD AUTO: 3.3 %
GLUCOSE QUALITATIVE U: NEGATIVE
GLUCOSE SERPL-MCNC: 86 MG/DL
HCT VFR BLD CALC: 32.9 %
HDLC SERPL-MCNC: 62 MG/DL
HGB BLD-MCNC: 9.7 G/DL
HYALINE CASTS: 0 /LPF
IMM GRANULOCYTES NFR BLD AUTO: 0.3 %
KETONES URINE: NEGATIVE
LDLC SERPL CALC-MCNC: 48 MG/DL
LEUKOCYTE ESTERASE URINE: ABNORMAL
LYMPHOCYTES # BLD AUTO: 1.05 K/UL
LYMPHOCYTES NFR BLD AUTO: 16 %
MAGNESIUM SERPL-MCNC: 1.9 MG/DL
MAN DIFF?: NORMAL
MCHC RBC-ENTMCNC: 28.3 PG
MCHC RBC-ENTMCNC: 29.5 GM/DL
MCV RBC AUTO: 95.9 FL
MICROSCOPIC-UA: NORMAL
MONOCYTES # BLD AUTO: 0.54 K/UL
MONOCYTES NFR BLD AUTO: 8.2 %
NEUTROPHILS # BLD AUTO: 4.7 K/UL
NEUTROPHILS NFR BLD AUTO: 71.4 %
NITRITE URINE: NEGATIVE
PH URINE: 6
PLATELET # BLD AUTO: 297 K/UL
POTASSIUM SERPL-SCNC: 4.3 MMOL/L
PROT SERPL-MCNC: 7.5 G/DL
PROTEIN URINE: ABNORMAL
RBC # BLD: 3.43 M/UL
RBC # FLD: 15.9 %
RED BLOOD CELLS URINE: 5 /HPF
SODIUM SERPL-SCNC: 139 MMOL/L
SPECIFIC GRAVITY URINE: 1.03
SQUAMOUS EPITHELIAL CELLS: 2 /HPF
TRIGL SERPL-MCNC: 101 MG/DL
TSH SERPL-ACNC: 1.93 UIU/ML
UROBILINOGEN URINE: NORMAL
VIT B12 SERPL-MCNC: 388 PG/ML
WBC # FLD AUTO: 6.58 K/UL
WHITE BLOOD CELLS URINE: 39 /HPF

## 2020-08-27 LAB
APPEARANCE: CLEAR
BACTERIA: NEGATIVE
BILIRUBIN URINE: NEGATIVE
BLOOD URINE: NEGATIVE
COLOR: NORMAL
GLUCOSE QUALITATIVE U: NEGATIVE
HYALINE CASTS: 2 /LPF
KETONES URINE: NEGATIVE
LEUKOCYTE ESTERASE URINE: NEGATIVE
MICROSCOPIC-UA: NORMAL
NITRITE URINE: NEGATIVE
PH URINE: 7
PROTEIN URINE: NEGATIVE
RED BLOOD CELLS URINE: 1 /HPF
SPECIFIC GRAVITY URINE: 1.02
SQUAMOUS EPITHELIAL CELLS: 1 /HPF
UROBILINOGEN URINE: NORMAL
WHITE BLOOD CELLS URINE: 1 /HPF

## 2020-08-28 LAB — BACTERIA UR CULT: NORMAL

## 2020-09-28 ENCOUNTER — RX RENEWAL (OUTPATIENT)
Age: 80
End: 2020-09-28

## 2020-11-15 ENCOUNTER — NON-APPOINTMENT (OUTPATIENT)
Age: 80
End: 2020-11-15

## 2020-11-17 ENCOUNTER — APPOINTMENT (OUTPATIENT)
Dept: INTERNAL MEDICINE | Facility: CLINIC | Age: 80
End: 2020-11-17
Payer: MEDICARE

## 2020-11-17 VITALS — TEMPERATURE: 97.4 F

## 2020-11-17 PROCEDURE — 90662 IIV NO PRSV INCREASED AG IM: CPT

## 2020-11-17 PROCEDURE — G0008: CPT

## 2020-12-28 ENCOUNTER — RX RENEWAL (OUTPATIENT)
Age: 80
End: 2020-12-28

## 2021-01-14 ENCOUNTER — APPOINTMENT (OUTPATIENT)
Dept: INTERNAL MEDICINE | Facility: CLINIC | Age: 81
End: 2021-01-14
Payer: MEDICARE

## 2021-01-14 VITALS
HEIGHT: 63 IN | HEART RATE: 70 BPM | BODY MASS INDEX: 17.72 KG/M2 | DIASTOLIC BLOOD PRESSURE: 70 MMHG | OXYGEN SATURATION: 96 % | RESPIRATION RATE: 15 BRPM | WEIGHT: 100 LBS | SYSTOLIC BLOOD PRESSURE: 110 MMHG | TEMPERATURE: 97.1 F

## 2021-01-14 DIAGNOSIS — R50.9 FEVER, UNSPECIFIED: ICD-10-CM

## 2021-01-14 DIAGNOSIS — Z87.898 PERSONAL HISTORY OF OTHER SPECIFIED CONDITIONS: ICD-10-CM

## 2021-01-14 DIAGNOSIS — Z92.29 PERSONAL HISTORY OF OTHER DRUG THERAPY: ICD-10-CM

## 2021-01-14 PROCEDURE — 99213 OFFICE O/P EST LOW 20 MIN: CPT | Mod: 25

## 2021-01-14 PROCEDURE — 36415 COLL VENOUS BLD VENIPUNCTURE: CPT

## 2021-01-14 NOTE — HISTORY OF PRESENT ILLNESS
[FreeTextEntry1] : On hyperlipidemia/PAF/GERD [de-identified] : Patient presents for followup on hyperlipidemia/PAF/GERD. Patient is currently fasting for today's labs and offers no complaints. Patient is currently on simvastatin for hyperlipidemia, on flecainide/aspirin and atenolol for PAF and on protonix for GERD\par -req Vit D level

## 2021-01-14 NOTE — ASSESSMENT
[FreeTextEntry1] : Venipuncture done in our office, Labs sent out and further recommendations will be made based on lab results. Patient advised to continue present medications with diet/exercise and specialists followup. Patient  will return to the office in july for CPE\par \par \par Mammogram was 9/2020\par CT of the chest was 8/2020\par Colonoscopy was 11/16\par Discussed COVID/ shingles vaccine=info given\par Bone density-10/15=declines\par Specialists include\par 1. Pulmonary-Dr. Diane\par 2. Orthopedic-Dr. Culp\par 3. Cardiology-Dr. Sin\par 4. ENT-Dr. WARE\par 5. Ophthalmology-Dr. Holden\par 6.Derm-Dr. Yolande lou\par  declines GYN evaluation\par 4/2018= hepatitis C screening\par carotid sonogram 4/2019 without stenosis\par echo 11/20\par patient continues to smoke, declines cessation

## 2021-01-15 LAB
25(OH)D3 SERPL-MCNC: 52.8 NG/ML
ALBUMIN SERPL ELPH-MCNC: 3.9 G/DL
ALP BLD-CCNC: 94 U/L
ALT SERPL-CCNC: 10 U/L
ANION GAP SERPL CALC-SCNC: 12 MMOL/L
AST SERPL-CCNC: 14 U/L
BASOPHILS # BLD AUTO: 0.06 K/UL
BASOPHILS NFR BLD AUTO: 1.1 %
BILIRUB SERPL-MCNC: 0.3 MG/DL
BUN SERPL-MCNC: 18 MG/DL
CALCIUM SERPL-MCNC: 11 MG/DL
CHLORIDE SERPL-SCNC: 103 MMOL/L
CHOLEST SERPL-MCNC: 144 MG/DL
CO2 SERPL-SCNC: 24 MMOL/L
CREAT SERPL-MCNC: 1.04 MG/DL
EOSINOPHIL # BLD AUTO: 0.32 K/UL
EOSINOPHIL NFR BLD AUTO: 6 %
GLUCOSE SERPL-MCNC: 74 MG/DL
HCT VFR BLD CALC: 33.2 %
HDLC SERPL-MCNC: 66 MG/DL
HGB BLD-MCNC: 10 G/DL
IMM GRANULOCYTES NFR BLD AUTO: 0.2 %
LDLC SERPL CALC-MCNC: 58 MG/DL
LYMPHOCYTES # BLD AUTO: 1.15 K/UL
LYMPHOCYTES NFR BLD AUTO: 21.7 %
MAN DIFF?: NORMAL
MCHC RBC-ENTMCNC: 29.9 PG
MCHC RBC-ENTMCNC: 30.1 GM/DL
MCV RBC AUTO: 99.1 FL
MONOCYTES # BLD AUTO: 0.51 K/UL
MONOCYTES NFR BLD AUTO: 9.6 %
NEUTROPHILS # BLD AUTO: 3.25 K/UL
NEUTROPHILS NFR BLD AUTO: 61.4 %
NONHDLC SERPL-MCNC: 78 MG/DL
PLATELET # BLD AUTO: 241 K/UL
POTASSIUM SERPL-SCNC: 4.6 MMOL/L
PROT SERPL-MCNC: 7.4 G/DL
RBC # BLD: 3.35 M/UL
RBC # FLD: 16.4 %
SODIUM SERPL-SCNC: 140 MMOL/L
T4 FREE SERPL-MCNC: 1 NG/DL
TRIGL SERPL-MCNC: 100 MG/DL
TSH SERPL-ACNC: 2.75 UIU/ML
WBC # FLD AUTO: 5.3 K/UL

## 2021-01-18 ENCOUNTER — NON-APPOINTMENT (OUTPATIENT)
Age: 81
End: 2021-01-18

## 2021-01-26 ENCOUNTER — APPOINTMENT (OUTPATIENT)
Dept: INTERNAL MEDICINE | Facility: CLINIC | Age: 81
End: 2021-01-26
Payer: MEDICARE

## 2021-01-26 VITALS
SYSTOLIC BLOOD PRESSURE: 110 MMHG | HEIGHT: 63 IN | RESPIRATION RATE: 15 BRPM | DIASTOLIC BLOOD PRESSURE: 75 MMHG | HEART RATE: 66 BPM | TEMPERATURE: 97.2 F | WEIGHT: 98 LBS | BODY MASS INDEX: 17.36 KG/M2 | OXYGEN SATURATION: 98 %

## 2021-01-26 DIAGNOSIS — K64.9 UNSPECIFIED HEMORRHOIDS: ICD-10-CM

## 2021-01-26 PROCEDURE — 99213 OFFICE O/P EST LOW 20 MIN: CPT

## 2021-01-26 NOTE — PHYSICAL EXAM
[No Acute Distress] : no acute distress [No Respiratory Distress] : no respiratory distress  [Clear to Auscultation] : lungs were clear to auscultation bilaterally [Normal Rate] : normal rate  [Regular Rhythm] : with a regular rhythm [Normal Affect] : the affect was normal [Normal Mood] : the mood was normal [Normal Outer Ear/Nose] : the outer ears and nose were normal in appearance [Normal Oropharynx] : the oropharynx was normal [Normal TMs] : both tympanic membranes were normal [Normal Nasal Mucosa] : the nasal mucosa was normal [Supple] : supple

## 2021-02-02 ENCOUNTER — NON-APPOINTMENT (OUTPATIENT)
Age: 81
End: 2021-02-02

## 2021-02-02 LAB
BACTERIA SPT CULT: ABNORMAL
OTHER FLUID CYTOLOGY: NORMAL

## 2021-02-02 NOTE — ASSESSMENT
[FreeTextEntry1] : Sputum sample sent out. CT chest ordered. Smoking cessation again advised. Further recommendations to follow. Patient will return to the office as scheduled for regular followup\par \par Dr. Orozco was present in office building while I examined patient\par

## 2021-02-02 NOTE — ADDENDUM
[FreeTextEntry1] : Sputum culture shows\par -No malignant cells\par -Moderate Pseudomonas\par Plan= CT chest as planned

## 2021-02-02 NOTE — HISTORY OF PRESENT ILLNESS
[FreeTextEntry8] : Patient presents stating that for at least a few months she gets brown tinged sputum but every now and again it is bright red blood tinged w/o pure hemoptysis. Patient has chronic cough/still smokes. Patient denies dyspnea/shortness of breath/epistaxis/blood in her throat. Patient had blood work done 1/14/21 showing improved H&H. Patient has no fever/night sweats. Patient is on aspirin. The patient has lung nodules, last CT was 8/2020

## 2021-02-10 ENCOUNTER — APPOINTMENT (OUTPATIENT)
Dept: INTERNAL MEDICINE | Facility: CLINIC | Age: 81
End: 2021-02-10
Payer: MEDICARE

## 2021-02-10 VITALS
DIASTOLIC BLOOD PRESSURE: 70 MMHG | RESPIRATION RATE: 18 BRPM | TEMPERATURE: 97.3 F | HEIGHT: 63 IN | BODY MASS INDEX: 17.72 KG/M2 | HEART RATE: 60 BPM | SYSTOLIC BLOOD PRESSURE: 115 MMHG | OXYGEN SATURATION: 98 % | WEIGHT: 100 LBS

## 2021-02-10 DIAGNOSIS — Z87.09 PERSONAL HISTORY OF OTHER DISEASES OF THE RESPIRATORY SYSTEM: ICD-10-CM

## 2021-02-10 DIAGNOSIS — R22.1 LOCALIZED SWELLING, MASS AND LUMP, NECK: ICD-10-CM

## 2021-02-10 DIAGNOSIS — N64.9 DISORDER OF BREAST, UNSPECIFIED: ICD-10-CM

## 2021-02-10 PROCEDURE — 99214 OFFICE O/P EST MOD 30 MIN: CPT

## 2021-02-10 NOTE — RESULTS/DATA
[] : results reviewed [de-identified] : November 2020 = NSR, LVH [de-identified] : echocardiogram November 2020 with normal LVEF without LVH with normal valves

## 2021-02-10 NOTE — ASSESSMENT
[Patient Optimized for Surgery] : Patient optimized for surgery [No Further Testing Recommended] : no further testing recommended [Continue medications as is] : Continue current medications [FreeTextEntry4] : 80-year-old female with history of PAF currently clinically in normal sinus rhythm with COPD who is a chronic cigarette smoker with recent CAT scan of the chest showing no new active disease.\par Patient also with history of breast and lung cancer with most recent testing showing QUIQUE.\par Therefore patient currently medically stable with no contraindication to planned low risk procedure.

## 2021-02-10 NOTE — PHYSICAL EXAM
[No Acute Distress] : no acute distress [No JVD] : no jugular venous distention [No Respiratory Distress] : no respiratory distress  [No Accessory Muscle Use] : no accessory muscle use [Clear to Auscultation] : lungs were clear to auscultation bilaterally [Normal Rate] : normal rate  [Regular Rhythm] : with a regular rhythm [No Edema] : there was no peripheral edema [Soft] : abdomen soft [Non Tender] : non-tender [No Focal Deficits] : no focal deficits [Normal Affect] : the affect was normal [de-identified] : Frail [de-identified] : Mild diffuse decreased breath sounds

## 2021-02-10 NOTE — HISTORY OF PRESENT ILLNESS
[Atrial Fibrillation] : atrial fibrillation [COPD] : COPD [No Adverse Anesthesia Reaction] : no adverse anesthesia reaction in self or family member [(Patient denies any chest pain, claudication, dyspnea on exertion, orthopnea, palpitations or syncope)] : Patient denies any chest pain, claudication, dyspnea on exertion, orthopnea, palpitations or syncope [Moderate (4-6 METs)] : Moderate (4-6 METs) [Anti-Platelet Agents: _____] : Anti-Platelet Agents: [unfilled] [Aortic Stenosis] : no aortic stenosis [Coronary Artery Disease] : no coronary artery disease [Recent Myocardial Infarction] : no recent myocardial infarction [Implantable Device/Pacemaker] : no implantable device/pacemaker [Asthma] : no asthma [Sleep Apnea] : no sleep apnea [Smoker] : not a smoker [Chronic Anticoagulation] : no chronic anticoagulation [Chronic Kidney Disease] : no chronic kidney disease [Diabetes] : no diabetes [FreeTextEntry1] : Tooth extraction [FreeTextEntry2] : February 16, 2020 [FreeTextEntry3] : Dr Romero [FreeTextEntry4] : 80-year-old female presents for medical evaluation prior to tooth extraction.\par \par Medical history significant for paroxysmal atrial fibrillation for which the patient is on aspirin daily in general he remains in sinus rhythm.\par No CAD, CHF, hepatorenal, hematological or diabetes history.\par \par Patient continues to be a long-term smoker and has COPD.\par \par History also includes lung and breast cancer.\par \par The patient has mild dyspnea on exertion which is not worsening but denies chest pain, edema, fever, chills and cough. [FreeTextEntry7] : Echocardiogram November 2020 with normal LVEF with normal valves.

## 2021-02-15 NOTE — H&P ADULT. - SOURCE OF INFORMATION, PROFILE
Patient comes to clinic for follow up anticoagulation visit.     DX:  A-fib   Goal:  2.0-3.0    Last INR on 1/4/21 was 2.9.  Dose maintained.   Today's INR is 2.7 and is within goal range.    Current warfarin total weekly dose of 30 mg verified.  Informed the INR result is within therapeutic range and instructed to maintain current dose per protocol. Discussed dose and return date of 3/29/21 for next INR. See Anticoagulation flowsheet.    Dr. Lawrence Malloy is in the office today supervising the treatment. Note forwarded for review.    Instructed to contact the clinic with any unusual bleeding or bruising, any changes in medications, diet, health status, lifestyle, or any other changes, questions or concerns. Verbalized understanding of all discussed.     AVS Decline-appointment card given  
patient

## 2021-02-24 ENCOUNTER — NON-APPOINTMENT (OUTPATIENT)
Age: 81
End: 2021-02-24

## 2021-03-30 ENCOUNTER — NON-APPOINTMENT (OUTPATIENT)
Age: 81
End: 2021-03-30

## 2021-04-30 ENCOUNTER — APPOINTMENT (OUTPATIENT)
Dept: INTERNAL MEDICINE | Facility: CLINIC | Age: 81
End: 2021-04-30
Payer: MEDICARE

## 2021-04-30 ENCOUNTER — RESULT CHARGE (OUTPATIENT)
Age: 81
End: 2021-04-30

## 2021-04-30 VITALS
BODY MASS INDEX: 17.72 KG/M2 | RESPIRATION RATE: 11 BRPM | TEMPERATURE: 97 F | HEIGHT: 63 IN | DIASTOLIC BLOOD PRESSURE: 70 MMHG | SYSTOLIC BLOOD PRESSURE: 110 MMHG | WEIGHT: 100 LBS | HEART RATE: 66 BPM

## 2021-04-30 DIAGNOSIS — R35.0 FREQUENCY OF MICTURITION: ICD-10-CM

## 2021-04-30 LAB
BILIRUB UR QL STRIP: NEGATIVE
GLUCOSE UR-MCNC: NEGATIVE
HCG UR QL: 0.2 EU/DL
HGB UR QL STRIP.AUTO: NORMAL
KETONES UR-MCNC: NEGATIVE
LEUKOCYTE ESTERASE UR QL STRIP: NORMAL
NITRITE UR QL STRIP: NEGATIVE
PH UR STRIP: 6
PROT UR STRIP-MCNC: 30
SP GR UR STRIP: 1.02

## 2021-04-30 PROCEDURE — 81002 URINALYSIS NONAUTO W/O SCOPE: CPT

## 2021-04-30 PROCEDURE — 99214 OFFICE O/P EST MOD 30 MIN: CPT | Mod: 25

## 2021-04-30 NOTE — PHYSICAL EXAM
[No Acute Distress] : no acute distress [No Respiratory Distress] : no respiratory distress  [Clear to Auscultation] : lungs were clear to auscultation bilaterally [Normal Rate] : normal rate  [Regular Rhythm] : with a regular rhythm [Normal Affect] : the affect was normal [Normal Mood] : the mood was normal [Soft] : abdomen soft [Non Tender] : non-tender [No CVA Tenderness] : no CVA  tenderness

## 2021-05-03 ENCOUNTER — NON-APPOINTMENT (OUTPATIENT)
Age: 81
End: 2021-05-03

## 2021-05-03 LAB — BACTERIA UR CULT: NORMAL

## 2021-05-04 NOTE — ASSESSMENT
[FreeTextEntry1] : Urine culture sent out. Patient started on Cipro 500 mg b.i.d. #6, to take with probiotic, limited in antibiotic use as patient has anaphylaxis with penicillin, cephalosporin and intolerance to Macrobid or Bactrim. Patient will call if symptoms continue or worsen and return to the office as scheduled for regular followup\par \par Dr. Orozco was present in office building while I examined patient\par

## 2021-05-04 NOTE — ADDENDUM
[FreeTextEntry1] : Patient called on 5/4/21 stating symptoms resolved and she does not need to go to urogyno

## 2021-05-04 NOTE — HISTORY OF PRESENT ILLNESS
[FreeTextEntry8] : Patient presents complaining of urinary frequency/urgency/dysuria for a few days. Patient denies hematuria/abdominal pain/back pain or fever. Patient states the urine also has an odor

## 2021-06-01 RX ORDER — PANTOPRAZOLE 20 MG/1
20 TABLET, DELAYED RELEASE ORAL DAILY
Qty: 15 | Refills: 0 | Status: ACTIVE | COMMUNITY
Start: 2018-06-22

## 2021-07-13 ENCOUNTER — APPOINTMENT (OUTPATIENT)
Dept: UROGYNECOLOGY | Facility: CLINIC | Age: 81
End: 2021-07-13

## 2021-08-11 ENCOUNTER — APPOINTMENT (OUTPATIENT)
Dept: INTERNAL MEDICINE | Facility: CLINIC | Age: 81
End: 2021-08-11
Payer: MEDICARE

## 2021-08-11 VITALS
HEART RATE: 83 BPM | OXYGEN SATURATION: 97 % | SYSTOLIC BLOOD PRESSURE: 120 MMHG | HEIGHT: 63 IN | TEMPERATURE: 97 F | RESPIRATION RATE: 12 BRPM | BODY MASS INDEX: 17.54 KG/M2 | WEIGHT: 99 LBS | DIASTOLIC BLOOD PRESSURE: 70 MMHG

## 2021-08-11 LAB
BILIRUB UR QL STRIP: NEGATIVE
CLARITY UR: NORMAL
COLLECTION METHOD: NORMAL
GLUCOSE UR-MCNC: NEGATIVE
HCG UR QL: 0.2 EU/DL
HGB UR QL STRIP.AUTO: NORMAL
KETONES UR-MCNC: NEGATIVE
LEUKOCYTE ESTERASE UR QL STRIP: NORMAL
NITRITE UR QL STRIP: NEGATIVE
PH UR STRIP: 6.5
PROT UR STRIP-MCNC: NEGATIVE
SP GR UR STRIP: 1.02

## 2021-08-11 PROCEDURE — 99213 OFFICE O/P EST LOW 20 MIN: CPT | Mod: 25

## 2021-08-11 PROCEDURE — 81002 URINALYSIS NONAUTO W/O SCOPE: CPT

## 2021-08-11 RX ORDER — SACCHAROMYCES BOULARDII 50 MG
250 CAPSULE ORAL TWICE DAILY
Qty: 28 | Refills: 0 | Status: DISCONTINUED | COMMUNITY
Start: 2021-02-10 | End: 2021-08-11

## 2021-08-11 RX ORDER — CLOBETASOL PROPIONATE 0.5 MG/G
0.05 CREAM TOPICAL TWICE DAILY
Qty: 1 | Refills: 0 | Status: DISCONTINUED | COMMUNITY
Start: 2017-10-12 | End: 2021-08-11

## 2021-08-11 RX ORDER — PENCICLOVIR 10 MG/G
1 CREAM TOPICAL
Qty: 1 | Refills: 3 | Status: DISCONTINUED | COMMUNITY
Start: 2017-06-30 | End: 2021-08-11

## 2021-08-11 RX ORDER — CIPROFLOXACIN HYDROCHLORIDE 500 MG/1
500 TABLET, FILM COATED ORAL TWICE DAILY
Qty: 6 | Refills: 0 | Status: DISCONTINUED | COMMUNITY
Start: 2021-04-30 | End: 2021-08-11

## 2021-08-15 LAB — BACTERIA UR CULT: ABNORMAL

## 2021-08-16 ENCOUNTER — NON-APPOINTMENT (OUTPATIENT)
Age: 81
End: 2021-08-16

## 2021-08-23 LAB
APPEARANCE: CLEAR
BACTERIA UR CULT: ABNORMAL
BACTERIA: NEGATIVE
BILIRUBIN URINE: NEGATIVE
BLOOD URINE: NEGATIVE
CALCIUM OXALATE CRYSTALS: ABNORMAL
COLOR: NORMAL
GLUCOSE QUALITATIVE U: NEGATIVE
HYALINE CASTS: 8 /LPF
KETONES URINE: NEGATIVE
LEUKOCYTE ESTERASE URINE: NEGATIVE
MICROSCOPIC-UA: NORMAL
NITRITE URINE: NEGATIVE
PH URINE: 6.5
PROTEIN URINE: NEGATIVE
RED BLOOD CELLS URINE: 1 /HPF
SPECIFIC GRAVITY URINE: 1.02
SQUAMOUS EPITHELIAL CELLS: 1 /HPF
URINE COMMENTS: NORMAL
UROBILINOGEN URINE: NORMAL
WHITE BLOOD CELLS URINE: 5 /HPF

## 2021-08-24 ENCOUNTER — NON-APPOINTMENT (OUTPATIENT)
Age: 81
End: 2021-08-24

## 2021-08-25 NOTE — HISTORY OF PRESENT ILLNESS
[FreeTextEntry8] : Pt presents c/o ongoing dysuria with urinary odor for at least 3-4 months. See prior note as patient was sent to urogynecology but states she tried to call there was a long wait so instead went to a regular gynecologist who found no abnormality, urine was not checked but gynecologic exam was done. Patient has no abdominal pain/back pain/fever.

## 2021-08-25 NOTE — ADDENDUM
[FreeTextEntry1] : Repeat Urine shows\par UA essentially normal\par Culture shows Coag neg staph\par Told to repeat urine only if +sx\par \par pt to see  8/25/21=to d/w

## 2021-08-25 NOTE — ASSESSMENT
[FreeTextEntry1] : Urine culture sent out. Patient started on Cipro 500 mg one b.i.d. #10 as UA is abnormal/ take with probiotic. Urology referral again given and appointment made for patient. Patient will return to the office for CPE when applicable\par \par \par \par Dr. Orozco was present in office building while I examined patient\par

## 2021-08-30 ENCOUNTER — NON-APPOINTMENT (OUTPATIENT)
Age: 81
End: 2021-08-30

## 2021-08-31 ENCOUNTER — APPOINTMENT (OUTPATIENT)
Dept: INTERNAL MEDICINE | Facility: CLINIC | Age: 81
End: 2021-08-31
Payer: MEDICARE

## 2021-08-31 VITALS
RESPIRATION RATE: 18 BRPM | TEMPERATURE: 97.6 F | HEIGHT: 63 IN | SYSTOLIC BLOOD PRESSURE: 120 MMHG | HEART RATE: 81 BPM | BODY MASS INDEX: 17.01 KG/M2 | WEIGHT: 96 LBS | DIASTOLIC BLOOD PRESSURE: 70 MMHG

## 2021-08-31 DIAGNOSIS — R82.90 UNSPECIFIED ABNORMAL FINDINGS IN URINE: ICD-10-CM

## 2021-08-31 DIAGNOSIS — Z01.818 ENCOUNTER FOR OTHER PREPROCEDURAL EXAMINATION: ICD-10-CM

## 2021-08-31 DIAGNOSIS — R29.3 ABNORMAL POSTURE: ICD-10-CM

## 2021-08-31 DIAGNOSIS — R41.3 OTHER AMNESIA: ICD-10-CM

## 2021-08-31 DIAGNOSIS — I65.23 OCCLUSION AND STENOSIS OF BILATERAL CAROTID ARTERIES: ICD-10-CM

## 2021-08-31 DIAGNOSIS — Z87.898 PERSONAL HISTORY OF OTHER SPECIFIED CONDITIONS: ICD-10-CM

## 2021-08-31 DIAGNOSIS — K21.9 GASTRO-ESOPHAGEAL REFLUX DISEASE W/OUT ESOPHAGITIS: ICD-10-CM

## 2021-08-31 PROCEDURE — G0444 DEPRESSION SCREEN ANNUAL: CPT

## 2021-08-31 PROCEDURE — 36415 COLL VENOUS BLD VENIPUNCTURE: CPT

## 2021-08-31 PROCEDURE — G0439: CPT

## 2021-09-01 DIAGNOSIS — M25.552 PAIN IN LEFT HIP: ICD-10-CM

## 2021-09-01 DIAGNOSIS — M53.3 SACROCOCCYGEAL DISORDERS, NOT ELSEWHERE CLASSIFIED: ICD-10-CM

## 2021-09-01 LAB
25(OH)D3 SERPL-MCNC: 87.9 NG/ML
ALBUMIN SERPL ELPH-MCNC: 4.1 G/DL
ALP BLD-CCNC: 102 U/L
ALT SERPL-CCNC: 7 U/L
ANION GAP SERPL CALC-SCNC: 7 MMOL/L
AST SERPL-CCNC: 11 U/L
BASOPHILS # BLD AUTO: 0.04 K/UL
BASOPHILS NFR BLD AUTO: 0.8 %
BILIRUB SERPL-MCNC: 0.4 MG/DL
BUN SERPL-MCNC: 19 MG/DL
CALCIUM SERPL-MCNC: 10.8 MG/DL
CHLORIDE SERPL-SCNC: 104 MMOL/L
CHOLEST SERPL-MCNC: 199 MG/DL
CO2 SERPL-SCNC: 26 MMOL/L
CREAT SERPL-MCNC: 1 MG/DL
EOSINOPHIL # BLD AUTO: 0.5 K/UL
EOSINOPHIL NFR BLD AUTO: 9.9 %
GLUCOSE SERPL-MCNC: 93 MG/DL
HCT VFR BLD CALC: 32.6 %
HDLC SERPL-MCNC: 64 MG/DL
HGB BLD-MCNC: 10.1 G/DL
IMM GRANULOCYTES NFR BLD AUTO: 0.2 %
LDLC SERPL CALC-MCNC: 116 MG/DL
LYMPHOCYTES # BLD AUTO: 1.04 K/UL
LYMPHOCYTES NFR BLD AUTO: 20.6 %
MAGNESIUM SERPL-MCNC: 2.1 MG/DL
MAN DIFF?: NORMAL
MCHC RBC-ENTMCNC: 29.8 PG
MCHC RBC-ENTMCNC: 31 GM/DL
MCV RBC AUTO: 96.2 FL
MONOCYTES # BLD AUTO: 0.49 K/UL
MONOCYTES NFR BLD AUTO: 9.7 %
NEUTROPHILS # BLD AUTO: 2.97 K/UL
NEUTROPHILS NFR BLD AUTO: 58.8 %
NONHDLC SERPL-MCNC: 134 MG/DL
PLATELET # BLD AUTO: 245 K/UL
POTASSIUM SERPL-SCNC: 4.9 MMOL/L
PROT SERPL-MCNC: 7.3 G/DL
RBC # BLD: 3.39 M/UL
RBC # FLD: 16.1 %
SODIUM SERPL-SCNC: 138 MMOL/L
TRIGL SERPL-MCNC: 90 MG/DL
TSH SERPL-ACNC: 2.15 UIU/ML
VIT B12 SERPL-MCNC: 375 PG/ML
WBC # FLD AUTO: 5.05 K/UL

## 2021-09-01 NOTE — ASSESSMENT
[FreeTextEntry1] : 81-year-old female with controlled atrial fibrillation on medication without evidence of cardiovascular decompensation. Loop recorder is present to detect any episodes of A. fib.\par lipidemia previously on Zocor but discontinued 2 months ago secondary to hair loss.\par \par History of breast and lung cancer with no evidence of disease. Patient continues to smoke cigarettes with no desire for cessation.\par \par Patient with increased difficulties with most ADLs and IADLs with the desire of the patient and her family to keep her at home.\par Therefore the patient needs assistance at home to make a safe environment.\par Therefore recommending continuation of long-term care to assist patient and all her activities and decrease her risks of injury and falls and to keep her home.\par \par Mammography July 2020 with followup scheduled\par Bone density October 2015\par Colonoscopy May 2008. \par CAT scan of the chest July 2020 with followup scheduled\par Carotid duplex April 2019 with plaque without stenosis\par \par Vaccines up to date includig COVID\par venipunctures done today in the office\par Followup in 6 months

## 2021-09-01 NOTE — HISTORY OF PRESENT ILLNESS
[FreeTextEntry1] : 81-year-old female presents for her yearly physical with history of arrhythmia with includes atrial fibrillation remains in normal sinus rhythm on flecainide and atenolol.Patient has loop recorder.\par She remains on aspirin 81 mg daily.\par \par The patient has hyperlipidemia and had been on Zocor but requested and stopped it about 2 months ago secondary to hair loss.She feels her hair loss has improved.\par \par  Patient's past history includes lung cancer and breast cancer with testing July 2019 showing no evidence of disease. Patient continues to smoke cigarettes 1-1-1/2 packs per day and declines any suggestion of decreasing or quitting.\par \par she has a chronic pruritic rash for which she has seen dermatology including biopsies with no diagnoses.\par \par Patient generally feeling well without chest pain or shortness of breath.\par \par Patient has had an FUO for about 5 years with no specific etiology the possibilities including chronic sinusitis and colovaginal fistula for which she has seen GYN, general surgery and infectious disease.\par The patient states she still gets low grade fevers about twice a week at 99.2 and she takes Tylenol. She declines to not take Tylenol and see if the temperature goes higher.\par \par The patient's  passed away about 6 months ago and currently living alone which she is okay with.\par The patient and even more so her daughter express increased difficulty the patient to do her ADLs including needing assistance with bathing, dressing, toileting, transferring and walking. Has increased imbalance therefore increased risk of falling.\par Also increased issues with IADLs including needing assistance with shopping, preparing meals, housekeeping, doing laundry, transportation and managing finances.\par The patient is also having some decrease in memory.

## 2021-09-01 NOTE — PHYSICAL EXAM
[General Appearance - Alert] : alert [General Appearance - In No Acute Distress] : in no acute distress [Sclera] : the sclera and conjunctiva were normal [PERRL With Normal Accommodation] : pupils were equal in size, round, and reactive to light [Extraocular Movements] : extraocular movements were intact [Outer Ear] : the ears and nose were normal in appearance [Oropharynx] : the oropharynx was normal [Neck Appearance] : the appearance of the neck was normal [Neck Cervical Mass (___cm)] : no neck mass was observed [Jugular Venous Distention Increased] : there was no jugular-venous distention [Thyroid Diffuse Enlargement] : the thyroid was not enlarged [Thyroid Nodule] : there were no palpable thyroid nodules [Auscultation Breath Sounds / Voice Sounds] : lungs were clear to auscultation bilaterally [Heart Rate And Rhythm] : heart rate was normal and rhythm regular [Heart Sounds] : normal S1 and S2 [Heart Sounds Gallop] : no gallops [Murmurs] : no murmurs [Heart Sounds Pericardial Friction Rub] : no pericardial rub [Arterial Pulses Carotid] : carotid pulses were normal with no bruits [Abdominal Aorta] : the abdominal aorta was normal [Full Pulse] : the pedal pulses are present [Edema] : there was no peripheral edema [Veins - Varicosity Changes] : there were no varicosital changes [Breast Palpation Mass] : no palpable masses [Breast Abnormal Lactation (Galactorrhea)] : no nipple discharge [Bowel Sounds] : normal bowel sounds [Abdomen Soft] : soft [Abdomen Tenderness] : non-tender [Abdomen Mass (___ Cm)] : no abdominal mass palpated [Cervical Lymph Nodes Enlarged Posterior Bilaterally] : posterior cervical [Cervical Lymph Nodes Enlarged Anterior Bilaterally] : anterior cervical [Supraclavicular Lymph Nodes Enlarged Bilaterally] : supraclavicular [Axillary Lymph Nodes Enlarged Bilaterally] : axillary [Femoral Lymph Nodes Enlarged Bilaterally] : femoral [Inguinal Lymph Nodes Enlarged Bilaterally] : inguinal [No Spinal Tenderness] : no spinal tenderness [Abnormal Walk] : normal gait [Nail Clubbing] : no clubbing  or cyanosis of the fingernails [Musculoskeletal - Swelling] : no joint swelling seen [Motor Tone] : muscle strength and tone were normal [Skin Color & Pigmentation] : normal skin color and pigmentation [Skin Turgor] : normal skin turgor [] : no rash [No Focal Deficits] : no focal deficits [Oriented To Time, Place, And Person] : oriented to person, place, and time [Impaired Insight] : insight and judgment were intact [Affect] : the affect was normal [FreeTextEntry1] : MMSE = 27/30

## 2021-09-01 NOTE — HEALTH RISK ASSESSMENT
[Fair] : ~his/her~ current health as fair  [Good] : ~his/her~  mood as  good [] : Yes [No] : In the past 12 months have you used drugs other than those required for medical reasons? No [No falls in past year] : Patient reported no falls in the past year [0] : 2) Feeling down, depressed, or hopeless: Not at all (0) [None] : None [With Significant Other] : lives with significant other [Retired] : retired [College] : College [] :  [Reports changes in hearing] : Reports changes in hearing [Smoke Detector] : smoke detector [Carbon Monoxide Detector] : carbon monoxide detector [Seat Belt] :  uses seat belt [Sunscreen] : uses sunscreen [Some assistance needed] : walking [Independent] : managing medications [Full assistance needed] : managing finances [de-identified] : active [de-identified] : good [[id number]] : 0 [Change in mental status noted] : No change in mental status noted [Reports changes in dental health] : Reports no changes in dental health [de-identified] : Hearing aids [FreeTextEntry5] : unable to previous foods

## 2021-09-08 ENCOUNTER — NON-APPOINTMENT (OUTPATIENT)
Age: 81
End: 2021-09-08

## 2021-09-09 ENCOUNTER — NON-APPOINTMENT (OUTPATIENT)
Age: 81
End: 2021-09-09

## 2021-09-20 ENCOUNTER — NON-APPOINTMENT (OUTPATIENT)
Age: 81
End: 2021-09-20

## 2021-09-22 ENCOUNTER — APPOINTMENT (OUTPATIENT)
Dept: INTERNAL MEDICINE | Facility: CLINIC | Age: 81
End: 2021-09-22
Payer: MEDICARE

## 2021-09-22 VITALS — TEMPERATURE: 98.2 F

## 2021-09-22 DIAGNOSIS — Z23 ENCOUNTER FOR IMMUNIZATION: ICD-10-CM

## 2021-09-22 PROCEDURE — G0008: CPT

## 2021-09-22 PROCEDURE — 90662 IIV NO PRSV INCREASED AG IM: CPT

## 2021-10-04 ENCOUNTER — NON-APPOINTMENT (OUTPATIENT)
Age: 81
End: 2021-10-04

## 2021-10-26 ENCOUNTER — NON-APPOINTMENT (OUTPATIENT)
Age: 81
End: 2021-10-26

## 2021-10-26 RX ORDER — CIPROFLOXACIN HYDROCHLORIDE 500 MG/1
500 TABLET, FILM COATED ORAL TWICE DAILY
Qty: 10 | Refills: 0 | Status: DISCONTINUED | COMMUNITY
Start: 2021-08-11 | End: 2021-10-26

## 2021-11-16 ENCOUNTER — APPOINTMENT (OUTPATIENT)
Dept: INTERNAL MEDICINE | Facility: CLINIC | Age: 81
End: 2021-11-16
Payer: MEDICARE

## 2021-11-16 VITALS
TEMPERATURE: 97.2 F | WEIGHT: 96 LBS | BODY MASS INDEX: 17.01 KG/M2 | SYSTOLIC BLOOD PRESSURE: 120 MMHG | HEART RATE: 67 BPM | DIASTOLIC BLOOD PRESSURE: 80 MMHG | OXYGEN SATURATION: 96 % | HEIGHT: 63 IN | RESPIRATION RATE: 13 BRPM

## 2021-11-16 PROCEDURE — 99213 OFFICE O/P EST LOW 20 MIN: CPT | Mod: 25

## 2021-11-16 PROCEDURE — 36415 COLL VENOUS BLD VENIPUNCTURE: CPT

## 2021-11-17 LAB
ALBUMIN SERPL ELPH-MCNC: 3.7 G/DL
ALP BLD-CCNC: 91 U/L
ALT SERPL-CCNC: 5 U/L
ANION GAP SERPL CALC-SCNC: 9 MMOL/L
AST SERPL-CCNC: 13 U/L
BASOPHILS # BLD AUTO: 0.06 K/UL
BASOPHILS NFR BLD AUTO: 1 %
BILIRUB SERPL-MCNC: 0.2 MG/DL
BUN SERPL-MCNC: 22 MG/DL
CALCIUM SERPL-MCNC: 11.1 MG/DL
CHLORIDE SERPL-SCNC: 105 MMOL/L
CO2 SERPL-SCNC: 26 MMOL/L
CREAT SERPL-MCNC: 1.46 MG/DL
EOSINOPHIL # BLD AUTO: 0.54 K/UL
EOSINOPHIL NFR BLD AUTO: 8.8 %
FERRITIN SERPL-MCNC: 22 NG/ML
FOLATE SERPL-MCNC: 11.6 NG/ML
GLUCOSE SERPL-MCNC: 104 MG/DL
HCT VFR BLD CALC: 31.8 %
HGB BLD-MCNC: 9.6 G/DL
IMM GRANULOCYTES NFR BLD AUTO: 0.2 %
IRON SATN MFR SERPL: 11 %
IRON SERPL-MCNC: 31 UG/DL
LYMPHOCYTES # BLD AUTO: 0.97 K/UL
LYMPHOCYTES NFR BLD AUTO: 15.8 %
MAN DIFF?: NORMAL
MCHC RBC-ENTMCNC: 29.1 PG
MCHC RBC-ENTMCNC: 30.2 GM/DL
MCV RBC AUTO: 96.4 FL
MONOCYTES # BLD AUTO: 0.44 K/UL
MONOCYTES NFR BLD AUTO: 7.2 %
NEUTROPHILS # BLD AUTO: 4.12 K/UL
NEUTROPHILS NFR BLD AUTO: 67 %
PLATELET # BLD AUTO: 232 K/UL
POTASSIUM SERPL-SCNC: 4.7 MMOL/L
PROT SERPL-MCNC: 6.9 G/DL
RBC # BLD: 3.3 M/UL
RBC # FLD: 14.9 %
SODIUM SERPL-SCNC: 140 MMOL/L
TIBC SERPL-MCNC: 275 UG/DL
TSH SERPL-ACNC: 2.1 UIU/ML
UIBC SERPL-MCNC: 244 UG/DL
VIT B12 SERPL-MCNC: >2000 PG/ML
WBC # FLD AUTO: 6.14 K/UL

## 2021-11-17 NOTE — PHYSICAL EXAM
[No Acute Distress] : no acute distress [No Respiratory Distress] : no respiratory distress  [Clear to Auscultation] : lungs were clear to auscultation bilaterally [Normal Rate] : normal rate  [Regular Rhythm] : with a regular rhythm [Normal Affect] : the affect was normal [Normal Mood] : the mood was normal [de-identified] : +pale appearing

## 2021-11-17 NOTE — ASSESSMENT
[FreeTextEntry1] : Venipuncture done in our office, Labs sent out.Discussed sleep study which patient adamantly declined. Patient told she can continue B12 supplement if it is benefiting her. Further recommendations to follow. Patient will return to the office as scheduled for regular followup\par \par Dr. Orozco was present in office building while I examined patient\par

## 2021-11-17 NOTE — ADDENDUM
[FreeTextEntry1] : Labs show\par -Creatinine elevated at 1.4 with calcium 11.1, check one month\par -H&H 9.6/31.8 = referred to hematology

## 2021-11-17 NOTE — HISTORY OF PRESENT ILLNESS
[FreeTextEntry8] : Patient presents complaining of ongoing fatigue since the summer. Patient states she is sleeping well at night and also can take up to 3 naps per day. Patient tried taking B12 supplement on occasion which did not help much. Patient has no other complaints including chest pain/palpitations/dyspnea, eating and drinking normally

## 2021-11-18 ENCOUNTER — NON-APPOINTMENT (OUTPATIENT)
Age: 81
End: 2021-11-18

## 2021-12-15 ENCOUNTER — APPOINTMENT (OUTPATIENT)
Dept: INTERNAL MEDICINE | Facility: CLINIC | Age: 81
End: 2021-12-15
Payer: MEDICARE

## 2021-12-15 VITALS
BODY MASS INDEX: 16.83 KG/M2 | HEIGHT: 63 IN | TEMPERATURE: 97.9 F | OXYGEN SATURATION: 95 % | WEIGHT: 95 LBS | RESPIRATION RATE: 12 BRPM | DIASTOLIC BLOOD PRESSURE: 78 MMHG | HEART RATE: 90 BPM | SYSTOLIC BLOOD PRESSURE: 120 MMHG

## 2021-12-15 PROCEDURE — 99213 OFFICE O/P EST LOW 20 MIN: CPT | Mod: 25

## 2021-12-15 PROCEDURE — 36415 COLL VENOUS BLD VENIPUNCTURE: CPT

## 2021-12-16 ENCOUNTER — NON-APPOINTMENT (OUTPATIENT)
Age: 81
End: 2021-12-16

## 2021-12-16 LAB
ANION GAP SERPL CALC-SCNC: 11 MMOL/L
BASOPHILS # BLD AUTO: 0.07 K/UL
BASOPHILS NFR BLD AUTO: 1.4 %
BUN SERPL-MCNC: 20 MG/DL
CALCIUM SERPL-MCNC: 11.4 MG/DL
CALCIUM SERPL-MCNC: 11.4 MG/DL
CHLORIDE SERPL-SCNC: 102 MMOL/L
CO2 SERPL-SCNC: 26 MMOL/L
CREAT SERPL-MCNC: 1.17 MG/DL
EOSINOPHIL # BLD AUTO: 0.35 K/UL
EOSINOPHIL NFR BLD AUTO: 7 %
GLUCOSE SERPL-MCNC: 85 MG/DL
HCT VFR BLD CALC: 32.7 %
HGB BLD-MCNC: 9.8 G/DL
IMM GRANULOCYTES NFR BLD AUTO: 0.2 %
LYMPHOCYTES # BLD AUTO: 1.09 K/UL
LYMPHOCYTES NFR BLD AUTO: 21.8 %
MAN DIFF?: NORMAL
MCHC RBC-ENTMCNC: 29.1 PG
MCHC RBC-ENTMCNC: 30 GM/DL
MCV RBC AUTO: 97 FL
MONOCYTES # BLD AUTO: 0.45 K/UL
MONOCYTES NFR BLD AUTO: 9 %
NEUTROPHILS # BLD AUTO: 3.03 K/UL
NEUTROPHILS NFR BLD AUTO: 60.6 %
PARATHYROID HORMONE INTACT: 83 PG/ML
PLATELET # BLD AUTO: 266 K/UL
POTASSIUM SERPL-SCNC: 4.3 MMOL/L
RBC # BLD: 3.37 M/UL
RBC # FLD: 14.6 %
SODIUM SERPL-SCNC: 139 MMOL/L
WBC # FLD AUTO: 5 K/UL

## 2021-12-16 NOTE — ASSESSMENT
[FreeTextEntry1] : Venipuncture done in our office, Labs sent out and further recommendations will be made based on lab results. Patient advised to continue present medications with diet/exercise and specialists followup. Patient  will return to the office in feb for 6month check with CP in aug\par \par \par Mammogram was 9/2021\par CT of the chest was 9/2021\par Colonoscopy was 11/16\par Discussed shingles vaccine, +got covid vaccines X3\par Bone density-10/15=declines\par Specialists include\par 1. Pulmonary-Dr. Diane\par 2. Orthopedic-Dr. Culp\par 3. Cardiology-Dr. Sin\par 4. ENT-Dr. WARE\par 5. Ophthalmology-Dr. Holden\par 6.Derm-Dr. Yolande lou\par 7.-Dr. Fernando\par 8.Hematology for anemia=declines\par  declines GYN evaluation\par 4/2018= hepatitis C screening\par carotid sonogram 4/2019 without stenosis\par echo 11/20\par patient continues to smoke, declines cessation

## 2021-12-16 NOTE — ADDENDUM
[FreeTextEntry1] : Labs show\par -PTH of 82 with calcium of 11.4= recommend patient followup with endocrinology\par -H&H of 9.8/32.7 = stable, will monitor since declined hematology evaluation

## 2021-12-16 NOTE — HISTORY OF PRESENT ILLNESS
[FreeTextEntry1] : Followup [de-identified] : Patient presents for followup \par Last labs showed\par -Creat of 1.4 with CA of 11.1 and chronic anemia of which pt was sent to hematology "no need to go, I have had it my whole life"\par -abnormal results d/w pt and questions answered\par -got covid vaccine X3

## 2022-01-18 NOTE — ED PROVIDER NOTE - RESPIRATORY, MLM
Breath sounds clear and equal bilaterally. Rhomboid Transposition Flap Text: The defect edges were debeveled with a #15 scalpel blade.  Given the location of the defect and the proximity to free margins a rhomboid transposition flap was deemed most appropriate.  Using a sterile surgical marker, an appropriate rhomboid flap was drawn incorporating the defect.    The area thus outlined was incised deep to adipose tissue with a #15 scalpel blade.  The skin margins were undermined to an appropriate distance in all directions utilizing iris scissors.

## 2022-01-26 ENCOUNTER — RESULT CHARGE (OUTPATIENT)
Age: 82
End: 2022-01-26

## 2022-01-26 ENCOUNTER — APPOINTMENT (OUTPATIENT)
Age: 82
End: 2022-01-26
Payer: MEDICARE

## 2022-01-26 DIAGNOSIS — L98.8 OTHER SPECIFIED DISORDERS OF THE SKIN AND SUBCUTANEOUS TISSUE: ICD-10-CM

## 2022-01-26 DIAGNOSIS — R35.1 NOCTURIA: ICD-10-CM

## 2022-01-26 DIAGNOSIS — R39.13 SPLITTING OF URINARY STREAM: ICD-10-CM

## 2022-01-26 LAB
BILIRUB UR QL STRIP: NEGATIVE
CLARITY UR: CLEAR
COLLECTION METHOD: NORMAL
GLUCOSE UR-MCNC: NEGATIVE
HCG UR QL: 0.2 EU/DL
HGB UR QL STRIP.AUTO: NORMAL
KETONES UR-MCNC: NEGATIVE
LEUKOCYTE ESTERASE UR QL STRIP: NORMAL
NITRITE UR QL STRIP: NORMAL
PH UR STRIP: 6
PROT UR STRIP-MCNC: 30
SP GR UR STRIP: 1.02

## 2022-01-26 PROCEDURE — 51798 US URINE CAPACITY MEASURE: CPT

## 2022-01-26 PROCEDURE — 81003 URINALYSIS AUTO W/O SCOPE: CPT | Mod: QW

## 2022-01-26 PROCEDURE — 99204 OFFICE O/P NEW MOD 45 MIN: CPT

## 2022-01-26 RX ORDER — ESTRADIOL 0.1 MG/G
0.1 CREAM VAGINAL
Qty: 1 | Refills: 2 | Status: DISCONTINUED | COMMUNITY
Start: 2022-01-26 | End: 2022-01-26

## 2022-01-26 NOTE — HISTORY OF PRESENT ILLNESS
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 Mcnair and was given nystatin and reports it did not help, she was told to try vaseline and that helps, also on the L side she has a possible ingrown hair, uses preparation H, reports pain when urine hits skin, reports nocturia X 3, no daytime urinary symptoms, no incontinence, intermittent stream, no bulge, no constipation \par \par current smoker\par h/o breast cancer\par

## 2022-01-26 NOTE — DISCUSSION/SUMMARY
[FreeTextEntry1] : \par Rosa M presents with vulvar irritation, severe atrophy on exam. She has a history of breast cancer and does not want to use estrogen, she reports she feels good with the vaseline. Regarding urinary symptoms they do not bother her. We dsicussed vulvar care. She refused catheterization to eval for UTI and was unable to tolerate speculum exam. I recommend she cont vulvar care, will f/u affirm, u/a and cx from clean catch sent. All questions were answered.\par \par \par [] vag cx\par [] vulvar care \par [] u/a cx (clean catch)\par [] return in 2 months

## 2022-01-26 NOTE — PHYSICAL EXAM
[Chaperone Present] : A chaperone was present in the examining room during all aspects of the physical examination [No Acute Distress] : in no acute distress [Well developed] : well developed [Well Nourished] : ~L well nourished [Tenderness] : ~T no ~M abdominal tenderness observed [Distended] : not distended [None] : no CVA tenderness [Inguinal LAD] : no adenopathy was noted in the inguinal lymph nodes [Warm and Dry] : was warm and dry to touch [Vulvar Atrophy] : vulvar atrophy [Normal] : was normal [de-identified] : severe atrophy with possible yeast [FreeTextEntry4] : unable to tolerate speculum exam  [de-identified] : refused cath

## 2022-01-28 LAB
BACTERIA UR CULT: NORMAL
CANDIDA VAG CYTO: NOT DETECTED
G VAGINALIS+PREV SP MTYP VAG QL MICRO: NOT DETECTED
T VAGINALIS VAG QL WET PREP: NOT DETECTED

## 2022-01-31 LAB
APPEARANCE: CLEAR
BACTERIA: NEGATIVE
BILIRUBIN URINE: NEGATIVE
BLOOD URINE: NEGATIVE
COLOR: YELLOW
GLUCOSE QUALITATIVE U: NEGATIVE
HYALINE CASTS: 3 /LPF
KETONES URINE: NEGATIVE
LEUKOCYTE ESTERASE URINE: ABNORMAL
MICROSCOPIC-UA: NORMAL
NITRITE URINE: NEGATIVE
PH URINE: 6
PROTEIN URINE: ABNORMAL
RED BLOOD CELLS URINE: 3 /HPF
SPECIFIC GRAVITY URINE: 1.02
SQUAMOUS EPITHELIAL CELLS: 3 /HPF
UROBILINOGEN URINE: NORMAL
WHITE BLOOD CELLS URINE: 21 /HPF

## 2022-02-01 ENCOUNTER — NON-APPOINTMENT (OUTPATIENT)
Age: 82
End: 2022-02-01

## 2022-03-30 ENCOUNTER — APPOINTMENT (OUTPATIENT)
Dept: UROGYNECOLOGY | Facility: CLINIC | Age: 82
End: 2022-03-30
Payer: MEDICARE

## 2022-03-30 DIAGNOSIS — N90.5 ATROPHY OF VULVA: ICD-10-CM

## 2022-03-30 PROCEDURE — 99212 OFFICE O/P EST SF 10 MIN: CPT

## 2022-03-30 NOTE — HISTORY OF PRESENT ILLNESS
[Cystocele (Obstetric)] : no [Vaginal Wall Prolapse] : no [Rectal Prolapse] : no [Urinary Frequency] : no [Urinary Tract Infection] : no [Hematuria] : no [Constipation Obstructed Defecation] : no [Pelvic Pain] : no [Vaginal Pain] : no [Back Pain] : mild [FreeTextEntry1] : \par Pt presents to office for f/u on vulvar irritation.  She was seen 1/2022, urine culture and BV panel were both negative at that time.  Vaginal estrogen was discussed with pt but she declined d/t personal hx breast cancer.  Today she reports that she has been using vaseline with relief.  She stopped using this about 5 days ago and now has some burning when urine touches the skin.  She denies dysuria, increased frequency, urgency, blood in the urine, fever or chills.  She is overall doing well.  She will continue with vaseline as needed.  We also discussed zinc oxide and reviewed vulvar care.  She will f/u if symptoms worsen or if she becomes symptomatic for UTI.\par \par [] vaseline\par [] vulvar care\par [] f/u as needed.

## 2022-07-12 ENCOUNTER — NON-APPOINTMENT (OUTPATIENT)
Age: 82
End: 2022-07-12

## 2022-07-16 ENCOUNTER — NON-APPOINTMENT (OUTPATIENT)
Age: 82
End: 2022-07-16

## 2022-08-15 ENCOUNTER — APPOINTMENT (OUTPATIENT)
Dept: INTERNAL MEDICINE | Facility: CLINIC | Age: 82
End: 2022-08-15

## 2022-08-18 ENCOUNTER — NON-APPOINTMENT (OUTPATIENT)
Age: 82
End: 2022-08-18

## 2022-08-31 ENCOUNTER — APPOINTMENT (OUTPATIENT)
Dept: INTERNAL MEDICINE | Facility: CLINIC | Age: 82
End: 2022-08-31

## 2022-08-31 VITALS
HEIGHT: 63 IN | BODY MASS INDEX: 16.66 KG/M2 | HEART RATE: 74 BPM | DIASTOLIC BLOOD PRESSURE: 70 MMHG | SYSTOLIC BLOOD PRESSURE: 115 MMHG | WEIGHT: 94 LBS | RESPIRATION RATE: 18 BRPM | OXYGEN SATURATION: 96 %

## 2022-08-31 DIAGNOSIS — D64.9 ANEMIA, UNSPECIFIED: ICD-10-CM

## 2022-08-31 DIAGNOSIS — Z79.899 OTHER LONG TERM (CURRENT) DRUG THERAPY: ICD-10-CM

## 2022-08-31 DIAGNOSIS — R25.1 TREMOR, UNSPECIFIED: ICD-10-CM

## 2022-08-31 PROCEDURE — G0444 DEPRESSION SCREEN ANNUAL: CPT

## 2022-08-31 PROCEDURE — G0439: CPT

## 2022-08-31 PROCEDURE — 36415 COLL VENOUS BLD VENIPUNCTURE: CPT

## 2022-08-31 NOTE — HEALTH RISK ASSESSMENT
[Fair] : ~his/her~ current health as fair  [Good] : ~his/her~  mood as  good [Current] : Current [No] : In the past 12 months have you used drugs other than those required for medical reasons? No [No falls in past year] : Patient reported no falls in the past year [0] : 2) Feeling down, depressed, or hopeless: Not at all (0) [None] : None [With Significant Other] : lives with significant other [Retired] : retired [College] : College [] :  [Some assistance needed] : walking [Independent] : managing medications [Full assistance needed] : managing finances [Reports changes in hearing] : Reports changes in hearing [Smoke Detector] : smoke detector [Carbon Monoxide Detector] : carbon monoxide detector [Seat Belt] :  uses seat belt [Sunscreen] : uses sunscreen [de-identified] : active [de-identified] : good [BHZ2Yents] : 0 [Change in mental status noted] : No change in mental status noted [Reports changes in dental health] : Reports no changes in dental health [FreeTextEntry5] : unable to previous foods [de-identified] : Hearing aids

## 2022-08-31 NOTE — HISTORY OF PRESENT ILLNESS
[FreeTextEntry1] : 82-year-old female presents for her yearly physical with history of arrhythmia with includes atrial fibrillation remains in normal sinus rhythm on flecainide and atenolol.Patient has loop recorder.\par She remains on aspirin 81 mg daily.\par \par The patient has hyperlipidemia and had been on Zocor but requested and stopped it about 14 months ago secondary to hair loss.She feels her hair loss has improved.\par She is now on atorvastatin 3 times a week\par \par Hyperparathyroidism with calcium being monitored\par \par  Patient's past history includes lung cancer and breast cancer with testing July 2019 showing no evidence of disease. 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 A continue therefore saw ENT and she informs me she had recent biopsies of her parotid gland. The results are pending.\par \par Patient has had an FUO for about 6 years with no specific etiology the possibilities including chronic sinusitis and colovaginal fistula for which she has seen GYN, general surgery and infectious disease.\par The patient states she still gets low grade fevers about twice a week at 99.2 and she takes Tylenol. \par \par The patient's  passed away about 18 months ago and currently living alone which she is okay with.\par The patient, and even more so her daughter, expressed increased difficulty the patient to do her ADLs including needing assistance with bathing, dressing, toileting, transferring and walking. Has increased imbalance therefore increased risk of falling.\par Also increased issues with IADLs including needing assistance with shopping, preparing meals, housekeeping, doing laundry, transportation and managing finances.\par The patient is also having some decrease in memory.

## 2022-08-31 NOTE — PHYSICAL EXAM
[General Appearance - Alert] : alert [General Appearance - In No Acute Distress] : in no acute distress [Sclera] : the sclera and conjunctiva were normal [PERRL With Normal Accommodation] : pupils were equal in size, round, and reactive to light [Extraocular Movements] : extraocular movements were intact [Outer Ear] : the ears and nose were normal in appearance [Oropharynx] : the oropharynx was normal [Neck Appearance] : the appearance of the neck was normal [Neck Cervical Mass (___cm)] : no neck mass was observed [Jugular Venous Distention Increased] : there was no jugular-venous distention [Thyroid Diffuse Enlargement] : the thyroid was not enlarged [Thyroid Nodule] : there were no palpable thyroid nodules [Auscultation Breath Sounds / Voice Sounds] : lungs were clear to auscultation bilaterally [Heart Rate And Rhythm] : heart rate was normal and rhythm regular [Heart Sounds] : normal S1 and S2 [Heart Sounds Gallop] : no gallops [Murmurs] : no murmurs [Heart Sounds Pericardial Friction Rub] : no pericardial rub [Arterial Pulses Carotid] : carotid pulses were normal with no bruits [Abdominal Aorta] : the abdominal aorta was normal [Full Pulse] : the pedal pulses are present [Edema] : there was no peripheral edema [Veins - Varicosity Changes] : there were no varicosital changes [Breast Palpation Mass] : no palpable masses [Breast Abnormal Lactation (Galactorrhea)] : no nipple discharge [Bowel Sounds] : normal bowel sounds [Abdomen Soft] : soft [Abdomen Tenderness] : non-tender [Abdomen Mass (___ Cm)] : no abdominal mass palpated [Cervical Lymph Nodes Enlarged Posterior Bilaterally] : posterior cervical [Cervical Lymph Nodes Enlarged Anterior Bilaterally] : anterior cervical [Supraclavicular Lymph Nodes Enlarged Bilaterally] : supraclavicular [Axillary Lymph Nodes Enlarged Bilaterally] : axillary [Femoral Lymph Nodes Enlarged Bilaterally] : femoral [Inguinal Lymph Nodes Enlarged Bilaterally] : inguinal [No Spinal Tenderness] : no spinal tenderness [Abnormal Walk] : normal gait [Nail Clubbing] : no clubbing  or cyanosis of the fingernails [Musculoskeletal - Swelling] : no joint swelling seen [Motor Tone] : muscle strength and tone were normal [Skin Color & Pigmentation] : normal skin color and pigmentation [Skin Turgor] : normal skin turgor [] : no rash [No Focal Deficits] : no focal deficits [Oriented To Time, Place, And Person] : oriented to person, place, and time [Impaired Insight] : insight and judgment were intact [Affect] : the affect was normal [FreeTextEntry1] : Positive tremor of hands and slightly of the head

## 2022-08-31 NOTE — ASSESSMENT
[FreeTextEntry1] : 82-year-old female with controlled atrial fibrillation on medication without evidence of cardiovascular decompensation. Loop recorder is present to detect any episodes of A. fib.\par lipidemia previously on Zocor but discontinued 14 months ago secondary to hair loss.She is now on atorvastatin\par Followup with cardiology as scheduled\par \par History of breast and lung cancer with no evidence of disease. Patient continues to smoke cigarettes with no desire for cessation.\par \par Patient with increased tremors therefore given referral to see neurology\par \par Carotid issue status post biopsy being monitored by ENT\par \par hyperparathyroidism with calcium being monitored\par \par Patient with increased difficulties with most ADLs and IADLs with the desire of the patient and her family to keep her at home.\par Therefore the patient needs assistance at home to make a safe environment.\par Therefore recommending continuation of long-term care to assist patient and all her activities and decrease her risks of injury and falls and to keep her home.\par \par Mammography July 2021 with followup scheduled\par Bone density October 2015\par Colonoscopy May 2008. \par CAT scan of the chest July 2022 \par Carotid duplex April 2019 with plaque without stenosis\par \par Vaccines up to date including COVID\par venipunctures done today in the office\par Followup in 6 months

## 2022-09-02 DIAGNOSIS — R80.9 PROTEINURIA, UNSPECIFIED: ICD-10-CM

## 2022-09-02 LAB
25(OH)D3 SERPL-MCNC: 54.6 NG/ML
ALBUMIN SERPL ELPH-MCNC: 4 G/DL
ALP BLD-CCNC: 101 U/L
ALT SERPL-CCNC: 13 U/L
ANION GAP SERPL CALC-SCNC: 10 MMOL/L
APPEARANCE: ABNORMAL
AST SERPL-CCNC: 15 U/L
BACTERIA: NEGATIVE
BASOPHILS # BLD AUTO: 0.06 K/UL
BASOPHILS NFR BLD AUTO: 1.1 %
BILIRUB SERPL-MCNC: <0.2 MG/DL
BILIRUBIN URINE: NEGATIVE
BLOOD URINE: NEGATIVE
BUN SERPL-MCNC: 17 MG/DL
CALCIUM OXALATE CRYSTALS: ABNORMAL
CALCIUM SERPL-MCNC: 11.2 MG/DL
CHLORIDE SERPL-SCNC: 105 MMOL/L
CHOLEST SERPL-MCNC: 166 MG/DL
CO2 SERPL-SCNC: 26 MMOL/L
COLOR: YELLOW
CREAT SERPL-MCNC: 1 MG/DL
EGFR: 56 ML/MIN/1.73M2
EOSINOPHIL # BLD AUTO: 0.68 K/UL
EOSINOPHIL NFR BLD AUTO: 12.5 %
FERRITIN SERPL-MCNC: 35 NG/ML
FOLATE SERPL-MCNC: 10.1 NG/ML
GLUCOSE QUALITATIVE U: NEGATIVE
GLUCOSE SERPL-MCNC: 95 MG/DL
HCT VFR BLD CALC: 29.1 %
HDLC SERPL-MCNC: 63 MG/DL
HGB BLD-MCNC: 8.9 G/DL
HYALINE CASTS: 0 /LPF
IMM GRANULOCYTES NFR BLD AUTO: 0.4 %
IRON SATN MFR SERPL: 6 %
IRON SERPL-MCNC: 19 UG/DL
KETONES URINE: NEGATIVE
LDLC SERPL CALC-MCNC: 78 MG/DL
LEUKOCYTE ESTERASE URINE: ABNORMAL
LYMPHOCYTES # BLD AUTO: 1.26 K/UL
LYMPHOCYTES NFR BLD AUTO: 23.1 %
MAGNESIUM SERPL-MCNC: 2 MG/DL
MAN DIFF?: NORMAL
MCHC RBC-ENTMCNC: 28.5 PG
MCHC RBC-ENTMCNC: 30.6 GM/DL
MCV RBC AUTO: 93.3 FL
MICROSCOPIC-UA: NORMAL
MONOCYTES # BLD AUTO: 0.5 K/UL
MONOCYTES NFR BLD AUTO: 9.2 %
NEUTROPHILS # BLD AUTO: 2.93 K/UL
NEUTROPHILS NFR BLD AUTO: 53.7 %
NITRITE URINE: NEGATIVE
NONHDLC SERPL-MCNC: 102 MG/DL
PH URINE: 6
PLATELET # BLD AUTO: 292 K/UL
POTASSIUM SERPL-SCNC: 4.5 MMOL/L
PROT SERPL-MCNC: 7 G/DL
PROTEIN URINE: ABNORMAL
RBC # BLD: 3.12 M/UL
RBC # FLD: 15.6 %
RED BLOOD CELLS URINE: 4 /HPF
SODIUM SERPL-SCNC: 140 MMOL/L
SPECIFIC GRAVITY URINE: 1.02
SQUAMOUS EPITHELIAL CELLS: 4 /HPF
T4 FREE SERPL-MCNC: 1.1 NG/DL
TIBC SERPL-MCNC: 307 UG/DL
TRIGL SERPL-MCNC: 120 MG/DL
TSH SERPL-ACNC: 2 UIU/ML
UIBC SERPL-MCNC: 288 UG/DL
UROBILINOGEN URINE: NORMAL
VIT B12 SERPL-MCNC: 799 PG/ML
WBC # FLD AUTO: 5.45 K/UL
WHITE BLOOD CELLS URINE: 10 /HPF

## 2022-09-07 ENCOUNTER — NON-APPOINTMENT (OUTPATIENT)
Age: 82
End: 2022-09-07

## 2022-09-19 ENCOUNTER — NON-APPOINTMENT (OUTPATIENT)
Age: 82
End: 2022-09-19

## 2022-09-20 ENCOUNTER — NON-APPOINTMENT (OUTPATIENT)
Age: 82
End: 2022-09-20

## 2022-09-20 ENCOUNTER — EMERGENCY (EMERGENCY)
Facility: HOSPITAL | Age: 82
LOS: 1 days | Discharge: HOME | End: 2022-09-20
Attending: EMERGENCY MEDICINE
Payer: MEDICARE

## 2022-09-20 VITALS
RESPIRATION RATE: 18 BRPM | HEIGHT: 60 IN | TEMPERATURE: 98 F | HEART RATE: 76 BPM | SYSTOLIC BLOOD PRESSURE: 112 MMHG | WEIGHT: 93.92 LBS | OXYGEN SATURATION: 96 % | DIASTOLIC BLOOD PRESSURE: 60 MMHG

## 2022-09-20 VITALS
OXYGEN SATURATION: 97 % | RESPIRATION RATE: 18 BRPM | HEART RATE: 74 BPM | TEMPERATURE: 98 F | SYSTOLIC BLOOD PRESSURE: 110 MMHG | DIASTOLIC BLOOD PRESSURE: 58 MMHG

## 2022-09-20 DIAGNOSIS — Z98.89 OTHER SPECIFIED POSTPROCEDURAL STATES: Chronic | ICD-10-CM

## 2022-09-20 DIAGNOSIS — Z90.2 ACQUIRED ABSENCE OF LUNG [PART OF]: Chronic | ICD-10-CM

## 2022-09-20 LAB
ALBUMIN SERPL ELPH-MCNC: 3.9 G/DL — SIGNIFICANT CHANGE UP (ref 3.3–5.2)
ALLERGY+IMMUNOLOGY DIAG STUDY NOTE: SIGNIFICANT CHANGE UP
ALLERGY+IMMUNOLOGY DIAG STUDY NOTE: SIGNIFICANT CHANGE UP
ALP SERPL-CCNC: 100 U/L — SIGNIFICANT CHANGE UP (ref 40–120)
ALT FLD-CCNC: 9 U/L — SIGNIFICANT CHANGE UP
ANION GAP SERPL CALC-SCNC: 12 MMOL/L — SIGNIFICANT CHANGE UP (ref 5–17)
AST SERPL-CCNC: 14 U/L — SIGNIFICANT CHANGE UP
BASE EXCESS BLDV CALC-SCNC: 3.1 MMOL/L — HIGH (ref -2–3)
BASOPHILS # BLD AUTO: 0.06 K/UL — SIGNIFICANT CHANGE UP (ref 0–0.2)
BASOPHILS NFR BLD AUTO: 0.7 % — SIGNIFICANT CHANGE UP (ref 0–2)
BILIRUB SERPL-MCNC: 0.5 MG/DL — SIGNIFICANT CHANGE UP (ref 0.4–2)
BLD GP AB SCN SERPL QL: SIGNIFICANT CHANGE UP
BUN SERPL-MCNC: 19.5 MG/DL — SIGNIFICANT CHANGE UP (ref 8–20)
CA-I SERPL-SCNC: 1.43 MMOL/L — HIGH (ref 1.15–1.33)
CALCIUM SERPL-MCNC: 10.8 MG/DL — HIGH (ref 8.4–10.5)
CHLORIDE BLDV-SCNC: 102 MMOL/L — SIGNIFICANT CHANGE UP (ref 98–107)
CHLORIDE SERPL-SCNC: 100 MMOL/L — SIGNIFICANT CHANGE UP (ref 98–107)
CO2 SERPL-SCNC: 25 MMOL/L — SIGNIFICANT CHANGE UP (ref 22–29)
CREAT SERPL-MCNC: 0.94 MG/DL — SIGNIFICANT CHANGE UP (ref 0.5–1.3)
DAT IGG-SP REAG RBC-IMP: ABNORMAL
DIR ANTIGLOB POLYSPECIFIC INTERPRETATION: ABNORMAL
EGFR: 60 ML/MIN/1.73M2 — SIGNIFICANT CHANGE UP
EOSINOPHIL # BLD AUTO: 0.37 K/UL — SIGNIFICANT CHANGE UP (ref 0–0.5)
EOSINOPHIL NFR BLD AUTO: 4.6 % — SIGNIFICANT CHANGE UP (ref 0–6)
GAS PNL BLDV: 134 MMOL/L — LOW (ref 136–145)
GAS PNL BLDV: SIGNIFICANT CHANGE UP
GLUCOSE BLDV-MCNC: 97 MG/DL — SIGNIFICANT CHANGE UP (ref 70–99)
GLUCOSE SERPL-MCNC: 94 MG/DL — SIGNIFICANT CHANGE UP (ref 70–99)
HCO3 BLDV-SCNC: 29 MMOL/L — SIGNIFICANT CHANGE UP (ref 22–29)
HCT VFR BLD CALC: 31.7 % — LOW (ref 34.5–45)
HCT VFR BLDA CALC: 30 % — SIGNIFICANT CHANGE UP
HGB BLD CALC-MCNC: 10 G/DL — LOW (ref 11.7–16.1)
HGB BLD-MCNC: 9.9 G/DL — LOW (ref 11.5–15.5)
IAT COMP-SP REAG SERPL QL: SIGNIFICANT CHANGE UP
IMM GRANULOCYTES NFR BLD AUTO: 0.4 % — SIGNIFICANT CHANGE UP (ref 0–0.9)
LACTATE BLDV-MCNC: 1.1 MMOL/L — SIGNIFICANT CHANGE UP (ref 0.5–2)
LIDOCAIN IGE QN: 23 U/L — SIGNIFICANT CHANGE UP (ref 22–51)
LYMPHOCYTES # BLD AUTO: 1.01 K/UL — SIGNIFICANT CHANGE UP (ref 1–3.3)
LYMPHOCYTES # BLD AUTO: 12.5 % — LOW (ref 13–44)
MCHC RBC-ENTMCNC: 28.7 PG — SIGNIFICANT CHANGE UP (ref 27–34)
MCHC RBC-ENTMCNC: 31.2 GM/DL — LOW (ref 32–36)
MCV RBC AUTO: 91.9 FL — SIGNIFICANT CHANGE UP (ref 80–100)
MONOCYTES # BLD AUTO: 0.66 K/UL — SIGNIFICANT CHANGE UP (ref 0–0.9)
MONOCYTES NFR BLD AUTO: 8.1 % — SIGNIFICANT CHANGE UP (ref 2–14)
NEUTROPHILS # BLD AUTO: 5.97 K/UL — SIGNIFICANT CHANGE UP (ref 1.8–7.4)
NEUTROPHILS NFR BLD AUTO: 73.7 % — SIGNIFICANT CHANGE UP (ref 43–77)
PCO2 BLDV: 55 MMHG — HIGH (ref 39–42)
PH BLDV: 7.33 — SIGNIFICANT CHANGE UP (ref 7.32–7.43)
PLATELET # BLD AUTO: 263 K/UL — SIGNIFICANT CHANGE UP (ref 150–400)
PO2 BLDV: 42 MMHG — SIGNIFICANT CHANGE UP (ref 25–45)
POTASSIUM BLDV-SCNC: 4.3 MMOL/L — SIGNIFICANT CHANGE UP (ref 3.5–5.1)
POTASSIUM SERPL-MCNC: 4.2 MMOL/L — SIGNIFICANT CHANGE UP (ref 3.5–5.3)
POTASSIUM SERPL-SCNC: 4.2 MMOL/L — SIGNIFICANT CHANGE UP (ref 3.5–5.3)
PROT SERPL-MCNC: 7.8 G/DL — SIGNIFICANT CHANGE UP (ref 6.6–8.7)
RBC # BLD: 3.45 M/UL — LOW (ref 3.8–5.2)
RBC # FLD: 15.9 % — HIGH (ref 10.3–14.5)
SAO2 % BLDV: 67.8 % — SIGNIFICANT CHANGE UP
SODIUM SERPL-SCNC: 137 MMOL/L — SIGNIFICANT CHANGE UP (ref 135–145)
WBC # BLD: 8.1 K/UL — SIGNIFICANT CHANGE UP (ref 3.8–10.5)
WBC # FLD AUTO: 8.1 K/UL — SIGNIFICANT CHANGE UP (ref 3.8–10.5)

## 2022-09-20 PROCEDURE — 82435 ASSAY OF BLOOD CHLORIDE: CPT

## 2022-09-20 PROCEDURE — 86850 RBC ANTIBODY SCREEN: CPT

## 2022-09-20 PROCEDURE — 83690 ASSAY OF LIPASE: CPT

## 2022-09-20 PROCEDURE — 85018 HEMOGLOBIN: CPT

## 2022-09-20 PROCEDURE — 36415 COLL VENOUS BLD VENIPUNCTURE: CPT

## 2022-09-20 PROCEDURE — 86900 BLOOD TYPING SEROLOGIC ABO: CPT

## 2022-09-20 PROCEDURE — 84295 ASSAY OF SERUM SODIUM: CPT

## 2022-09-20 PROCEDURE — 99284 EMERGENCY DEPT VISIT MOD MDM: CPT

## 2022-09-20 PROCEDURE — 99284 EMERGENCY DEPT VISIT MOD MDM: CPT | Mod: 25

## 2022-09-20 PROCEDURE — 86880 COOMBS TEST DIRECT: CPT

## 2022-09-20 PROCEDURE — 80053 COMPREHEN METABOLIC PANEL: CPT

## 2022-09-20 PROCEDURE — 86870 RBC ANTIBODY IDENTIFICATION: CPT

## 2022-09-20 PROCEDURE — 82803 BLOOD GASES ANY COMBINATION: CPT

## 2022-09-20 PROCEDURE — 82947 ASSAY GLUCOSE BLOOD QUANT: CPT

## 2022-09-20 PROCEDURE — 86901 BLOOD TYPING SEROLOGIC RH(D): CPT

## 2022-09-20 PROCEDURE — 85025 COMPLETE CBC W/AUTO DIFF WBC: CPT

## 2022-09-20 PROCEDURE — 83605 ASSAY OF LACTIC ACID: CPT

## 2022-09-20 PROCEDURE — 86905 BLOOD TYPING RBC ANTIGENS: CPT

## 2022-09-20 PROCEDURE — 84132 ASSAY OF SERUM POTASSIUM: CPT

## 2022-09-20 PROCEDURE — 82330 ASSAY OF CALCIUM: CPT

## 2022-09-20 PROCEDURE — 85014 HEMATOCRIT: CPT

## 2022-09-20 PROCEDURE — 86860 RBC ANTIBODY ELUTION: CPT

## 2022-09-20 NOTE — ED ADULT NURSE NOTE - NSICDXPASTSURGICALHX_GEN_ALL_CORE_FT
PAST SURGICAL HISTORY:  S/P lumpectomy, right breast CT/RT    S/P partial lobectomy of lung right

## 2022-09-20 NOTE — ED PROVIDER NOTE - CLINICAL SUMMARY MEDICAL DECISION MAKING FREE TEXT BOX
Scant rectal bleeding likely hemorrhoid, does have diverticulosis on prior imaging but no active hemorrhage on exam at this time, H/H at baseline, no tenderness or leukocytosis to suggest infectious source. Has Gi to fu with, return precautions provided.

## 2022-09-20 NOTE — ED ADULT NURSE NOTE - OBJECTIVE STATEMENT
Pt c/o blood stool.  Pt reports seeing red blood in stool.   Denies abdominal pain.  Denies urinary symptoms.

## 2022-09-20 NOTE — ED ADULT TRIAGE NOTE - DIRECT TO ROOM CARE INITIATED:
Sunscreen Recommendations: Use a mineral sunscreen daily to sun exposed areas. Reapply every 80 minutes or after sweating or swimming. I counseled the patient regarding the following:\\n
Detail Level: Generalized
Detail Level: Detailed
20-Sep-2022 13:46

## 2022-09-20 NOTE — ED ADULT NURSE NOTE - NSIMPLEMENTINTERV_GEN_ALL_ED
Implemented All Fall Risk Interventions:  [place] to call system. Call bell, personal items and telephone within reach. Instruct patient to call for assistance. Room bathroom lighting operational. Non-slip footwear when patient is off stretcher. Physically safe environment: no spills, clutter or unnecessary equipment. Stretcher in lowest position, wheels locked, appropriate side rails in place. Provide visual cue, wrist band, yellow gown, etc. Monitor gait and stability. Monitor for mental status changes and reorient to person, place, and time. Review medications for side effects contributing to fall risk. Reinforce activity limits and safety measures with patient and family.

## 2022-09-20 NOTE — ED PROVIDER NOTE - GASTROINTESTINAL, MLM
Abdomen soft, non-tender, no guarding. Rectal exam with external hemorrhoid, not active bleeding, scant brown stool in vault no blood (Chaperone Celina Mondestin SNA)

## 2022-09-20 NOTE — ED PROVIDER NOTE - OBJECTIVE STATEMENT
82yof w/ AF, HTN, HLD p/w blood in the stool. States over the last 2 days she has had intermittent episodes of bloody BMs. Describes stool a bit softer than usual with blood over the surface and bright red blood on the paper. Seems to be improved today, only scant blood on the paper today. Denies any abdominal pain. No hx of GIB but has known external hemorrhoids.

## 2022-09-20 NOTE — ED ADULT NURSE NOTE - NSICDXPASTMEDICALHX_GEN_ALL_CORE_FT
PAST MEDICAL HISTORY:  Anxiety     Atrial fibrillation PAF    Breast cancer right lumpectomy RT/CT    COPD (chronic obstructive pulmonary disease)     GERD (gastroesophageal reflux disease)     Hyperlipidemia     Hyperparathyroidism     Hypothyroid     Lung cancer right CT/RT    Palpitations     Sinus problem     Smoker

## 2022-09-20 NOTE — ED PROVIDER NOTE - PATIENT PORTAL LINK FT
You can access the FollowMyHealth Patient Portal offered by [facility] by registering at the following website: [email or web address]. By joining [employer]’s FollowMyHealth portal, you will also be able to view your health information using other applications (apps) compatible with our system.

## 2022-09-20 NOTE — ED PROVIDER NOTE - CPE EDP SKIN NORM
PRE-SEDATION ASSESSMENT    CONSENT       MEDICAL HISTORY  Significant medical/surgical history: Yes  Past Complications with Sedation/Anesthesia: No  Significant Family History: Yes  Smoking History: No  Alcohol/Drug abuse: No  Cardiac History: No  Respiratory History: No    PHYSICAL EXAM  History and Physical Reviewed: H&P completed today  Airway Risk History: No previous history  Airway Anatomy : Class III  Heart : Normal  Lungs : Normal  LOC/Mental Status : Normal    OTHER FINDINGS  Reviewed current medications and allergies: Yes  Pertinent lab/diagnostic test reviewed: Yes    SEDATION RISK ASSESSMENT  Risk Status ASA: Class II - Normal patient with mild systemic disease  Plan for Sedation: Moderate Sedation  Indications for Procedure/Pre-Procedure Diagnosis and Planned Procedure: lumbar disc herniation  EKG Monitoring: Yes    NARRATIVE FINDINGS      normal...

## 2022-09-21 ENCOUNTER — NON-APPOINTMENT (OUTPATIENT)
Age: 82
End: 2022-09-21

## 2022-09-23 ENCOUNTER — NON-APPOINTMENT (OUTPATIENT)
Age: 82
End: 2022-09-23

## 2022-09-28 ENCOUNTER — APPOINTMENT (OUTPATIENT)
Dept: INTERNAL MEDICINE | Facility: CLINIC | Age: 82
End: 2022-09-28

## 2022-09-28 PROCEDURE — G0008: CPT

## 2022-09-28 PROCEDURE — 90662 IIV NO PRSV INCREASED AG IM: CPT
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
## 2023-01-01 ENCOUNTER — OUTPATIENT (OUTPATIENT)
Dept: OUTPATIENT SERVICES | Facility: HOSPITAL | Age: 83
LOS: 1 days | Discharge: HOME | End: 2023-01-01

## 2023-01-01 ENCOUNTER — RESULT REVIEW (OUTPATIENT)
Age: 83
End: 2023-01-01

## 2023-01-01 ENCOUNTER — NON-APPOINTMENT (OUTPATIENT)
Age: 83
End: 2023-01-01

## 2023-01-01 ENCOUNTER — APPOINTMENT (OUTPATIENT)
Dept: HEMATOLOGY ONCOLOGY | Facility: CLINIC | Age: 83
End: 2023-01-01
Payer: MEDICARE

## 2023-01-01 ENCOUNTER — APPOINTMENT (OUTPATIENT)
Age: 83
End: 2023-01-01

## 2023-01-01 ENCOUNTER — APPOINTMENT (OUTPATIENT)
Dept: INTERNAL MEDICINE | Facility: CLINIC | Age: 83
End: 2023-01-01
Payer: MEDICARE

## 2023-01-01 ENCOUNTER — TRANSCRIPTION ENCOUNTER (OUTPATIENT)
Age: 83
End: 2023-01-01

## 2023-01-01 ENCOUNTER — APPOINTMENT (OUTPATIENT)
Dept: INTERNAL MEDICINE | Facility: CLINIC | Age: 83
End: 2023-01-01

## 2023-01-01 ENCOUNTER — OUTPATIENT (OUTPATIENT)
Dept: OUTPATIENT SERVICES | Facility: HOSPITAL | Age: 83
LOS: 1 days | End: 2023-01-01
Payer: MEDICARE

## 2023-01-01 ENCOUNTER — EMERGENCY (EMERGENCY)
Facility: HOSPITAL | Age: 83
LOS: 1 days | Discharge: HOME | End: 2023-01-01
Payer: MEDICARE

## 2023-01-01 ENCOUNTER — APPOINTMENT (OUTPATIENT)
Dept: HEMATOLOGY ONCOLOGY | Facility: CLINIC | Age: 83
End: 2023-01-01

## 2023-01-01 ENCOUNTER — APPOINTMENT (OUTPATIENT)
Dept: PULMONOLOGY | Facility: CLINIC | Age: 83
End: 2023-01-01

## 2023-01-01 ENCOUNTER — LABORATORY RESULT (OUTPATIENT)
Age: 83
End: 2023-01-01

## 2023-01-01 ENCOUNTER — INPATIENT (INPATIENT)
Facility: HOSPITAL | Age: 83
LOS: 1 days | Discharge: HOME | DRG: 868 | End: 2023-10-18
Attending: HOSPITALIST | Admitting: INTERNAL MEDICINE
Payer: COMMERCIAL

## 2023-01-01 ENCOUNTER — APPOINTMENT (OUTPATIENT)
Dept: CARE COORDINATION | Facility: HOME HEALTH | Age: 83
End: 2023-01-01
Payer: MEDICARE

## 2023-01-01 VITALS
DIASTOLIC BLOOD PRESSURE: 60 MMHG | TEMPERATURE: 96.6 F | HEIGHT: 63 IN | BODY MASS INDEX: 14.88 KG/M2 | WEIGHT: 84 LBS | HEART RATE: 90 BPM | RESPIRATION RATE: 18 BRPM | SYSTOLIC BLOOD PRESSURE: 108 MMHG | OXYGEN SATURATION: 95 %

## 2023-01-01 VITALS
TEMPERATURE: 98.3 F | DIASTOLIC BLOOD PRESSURE: 79 MMHG | OXYGEN SATURATION: 95 % | WEIGHT: 85.13 LBS | HEIGHT: 63 IN | BODY MASS INDEX: 15.08 KG/M2 | SYSTOLIC BLOOD PRESSURE: 153 MMHG | HEART RATE: 85 BPM

## 2023-01-01 VITALS
RESPIRATION RATE: 18 BRPM | DIASTOLIC BLOOD PRESSURE: 50 MMHG | OXYGEN SATURATION: 94 % | HEART RATE: 64 BPM | SYSTOLIC BLOOD PRESSURE: 126 MMHG | TEMPERATURE: 98 F

## 2023-01-01 VITALS
HEIGHT: 63 IN | HEART RATE: 80 BPM | WEIGHT: 90.01 LBS | DIASTOLIC BLOOD PRESSURE: 75 MMHG | SYSTOLIC BLOOD PRESSURE: 148 MMHG | OXYGEN SATURATION: 94 % | BODY MASS INDEX: 15.95 KG/M2

## 2023-01-01 VITALS
DIASTOLIC BLOOD PRESSURE: 70 MMHG | WEIGHT: 88 LBS | OXYGEN SATURATION: 96 % | HEIGHT: 63 IN | HEART RATE: 70 BPM | RESPIRATION RATE: 18 BRPM | BODY MASS INDEX: 15.59 KG/M2 | SYSTOLIC BLOOD PRESSURE: 158 MMHG

## 2023-01-01 VITALS
RESPIRATION RATE: 18 BRPM | SYSTOLIC BLOOD PRESSURE: 132 MMHG | HEART RATE: 79 BPM | BODY MASS INDEX: 17.01 KG/M2 | DIASTOLIC BLOOD PRESSURE: 70 MMHG | WEIGHT: 96 LBS | HEIGHT: 63 IN | OXYGEN SATURATION: 95 %

## 2023-01-01 VITALS
WEIGHT: 85.65 LBS | TEMPERATURE: 98.7 F | BODY MASS INDEX: 15.18 KG/M2 | HEIGHT: 63 IN | OXYGEN SATURATION: 95 % | SYSTOLIC BLOOD PRESSURE: 145 MMHG | HEART RATE: 71 BPM | DIASTOLIC BLOOD PRESSURE: 64 MMHG

## 2023-01-01 VITALS
BODY MASS INDEX: 15.6 KG/M2 | HEART RATE: 80 BPM | OXYGEN SATURATION: 97 % | WEIGHT: 88.04 LBS | SYSTOLIC BLOOD PRESSURE: 170 MMHG | HEIGHT: 63 IN | DIASTOLIC BLOOD PRESSURE: 75 MMHG

## 2023-01-01 VITALS
HEART RATE: 98 BPM | RESPIRATION RATE: 20 BRPM | DIASTOLIC BLOOD PRESSURE: 72 MMHG | OXYGEN SATURATION: 92 % | SYSTOLIC BLOOD PRESSURE: 114 MMHG | TEMPERATURE: 97.1 F

## 2023-01-01 VITALS
SYSTOLIC BLOOD PRESSURE: 102 MMHG | WEIGHT: 84 LBS | OXYGEN SATURATION: 100 % | HEART RATE: 83 BPM | BODY MASS INDEX: 14.88 KG/M2 | HEIGHT: 63 IN | DIASTOLIC BLOOD PRESSURE: 58 MMHG | RESPIRATION RATE: 20 BRPM

## 2023-01-01 VITALS
HEIGHT: 63 IN | OXYGEN SATURATION: 98 % | HEART RATE: 84 BPM | DIASTOLIC BLOOD PRESSURE: 78 MMHG | BODY MASS INDEX: 16.37 KG/M2 | SYSTOLIC BLOOD PRESSURE: 164 MMHG | TEMPERATURE: 98.4 F | WEIGHT: 92.38 LBS

## 2023-01-01 VITALS
SYSTOLIC BLOOD PRESSURE: 145 MMHG | OXYGEN SATURATION: 97 % | BODY MASS INDEX: 15.56 KG/M2 | DIASTOLIC BLOOD PRESSURE: 71 MMHG | WEIGHT: 87.83 LBS | HEART RATE: 81 BPM | HEIGHT: 63 IN

## 2023-01-01 VITALS
HEART RATE: 85 BPM | DIASTOLIC BLOOD PRESSURE: 75 MMHG | HEIGHT: 55 IN | OXYGEN SATURATION: 95 % | SYSTOLIC BLOOD PRESSURE: 160 MMHG | TEMPERATURE: 98 F | RESPIRATION RATE: 18 BRPM | WEIGHT: 87.96 LBS

## 2023-01-01 VITALS
BODY MASS INDEX: 15.06 KG/M2 | SYSTOLIC BLOOD PRESSURE: 125 MMHG | HEIGHT: 63 IN | RESPIRATION RATE: 18 BRPM | HEART RATE: 81 BPM | OXYGEN SATURATION: 93 % | WEIGHT: 85 LBS | DIASTOLIC BLOOD PRESSURE: 70 MMHG

## 2023-01-01 DIAGNOSIS — R05.3 CHRONIC COUGH: ICD-10-CM

## 2023-01-01 DIAGNOSIS — R53.83 OTHER FATIGUE: ICD-10-CM

## 2023-01-01 DIAGNOSIS — M81.0 AGE-RELATED OSTEOPOROSIS W/OUT CURRENT PATHOLOGICAL FRACTURE: ICD-10-CM

## 2023-01-01 DIAGNOSIS — Z98.89 OTHER SPECIFIED POSTPROCEDURAL STATES: Chronic | ICD-10-CM

## 2023-01-01 DIAGNOSIS — I48.0 PAROXYSMAL ATRIAL FIBRILLATION: ICD-10-CM

## 2023-01-01 DIAGNOSIS — Z00.00 ENCOUNTER FOR GENERAL ADULT MEDICAL EXAMINATION W/OUT ABNORMAL FINDINGS: ICD-10-CM

## 2023-01-01 DIAGNOSIS — D64.9 ANEMIA, UNSPECIFIED: ICD-10-CM
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
## 2023-01-01 DIAGNOSIS — Z90.2 ACQUIRED ABSENCE OF LUNG [PART OF]: Chronic | ICD-10-CM

## 2023-01-01 DIAGNOSIS — C34.90 MALIGNANT NEOPLASM OF UNSPECIFIED PART OF UNSPECIFIED BRONCHUS OR LUNG: ICD-10-CM

## 2023-01-01 DIAGNOSIS — D63.1 ANEMIA IN CHRONIC KIDNEY DISEASE: ICD-10-CM

## 2023-01-01 DIAGNOSIS — R07.89 OTHER CHEST PAIN: ICD-10-CM

## 2023-01-01 DIAGNOSIS — R10.13 EPIGASTRIC PAIN: ICD-10-CM

## 2023-01-01 DIAGNOSIS — Z09 ENCOUNTER FOR FOLLOW-UP EXAMINATION AFTER COMPLETED TREATMENT FOR CONDITIONS OTHER THAN MALIGNANT NEOPLASM: ICD-10-CM

## 2023-01-01 DIAGNOSIS — E21.3 HYPERPARATHYROIDISM, UNSPECIFIED: ICD-10-CM

## 2023-01-01 DIAGNOSIS — C50.911 MALIGNANT NEOPLASM OF UNSPECIFIED SITE OF RIGHT FEMALE BREAST: ICD-10-CM

## 2023-01-01 DIAGNOSIS — R07.82 INTERCOSTAL PAIN: ICD-10-CM

## 2023-01-01 DIAGNOSIS — N18.31 CHRONIC KIDNEY DISEASE, STAGE 3A: ICD-10-CM

## 2023-01-01 DIAGNOSIS — F17.210 NICOTINE DEPENDENCE, CIGARETTES, UNCOMPLICATED: ICD-10-CM

## 2023-01-01 DIAGNOSIS — R26.81 UNSTEADINESS ON FEET: ICD-10-CM

## 2023-01-01 DIAGNOSIS — K92.0 HEMATEMESIS: ICD-10-CM

## 2023-01-01 DIAGNOSIS — D63.8 ANEMIA IN OTHER CHRONIC DISEASES CLASSIFIED ELSEWHERE: ICD-10-CM

## 2023-01-01 DIAGNOSIS — N18.30 CHRONIC KIDNEY DISEASE, STAGE 3 UNSPECIFIED: ICD-10-CM

## 2023-01-01 DIAGNOSIS — Z87.898 PERSONAL HISTORY OF OTHER SPECIFIED CONDITIONS: ICD-10-CM

## 2023-01-01 DIAGNOSIS — B44.9 ASPERGILLOSIS, UNSPECIFIED: ICD-10-CM

## 2023-01-01 DIAGNOSIS — Z51.89 ENCOUNTER FOR OTHER SPECIFIED AFTERCARE: ICD-10-CM

## 2023-01-01 DIAGNOSIS — R64 CACHEXIA: ICD-10-CM

## 2023-01-01 DIAGNOSIS — R53.1 WEAKNESS: ICD-10-CM

## 2023-01-01 DIAGNOSIS — E78.5 HYPERLIPIDEMIA, UNSPECIFIED: ICD-10-CM

## 2023-01-01 DIAGNOSIS — Z79.899 OTHER LONG TERM (CURRENT) DRUG THERAPY: ICD-10-CM

## 2023-01-01 DIAGNOSIS — J44.9 CHRONIC OBSTRUCTIVE PULMONARY DISEASE, UNSPECIFIED: ICD-10-CM

## 2023-01-01 LAB
1,3 BETA GLUCAN SER QL: NEGATIVE — SIGNIFICANT CHANGE UP
1,3 BETA GLUCAN SER QL: NEGATIVE — SIGNIFICANT CHANGE UP
25(OH)D3 SERPL-MCNC: 39.8 NG/ML
A FLAVUS AB FLD QL: NEGATIVE — SIGNIFICANT CHANGE UP
A FLAVUS AB FLD QL: NEGATIVE — SIGNIFICANT CHANGE UP
A NIGER AB FLD QL: HIGH
ALBUMIN MFR SERPL ELPH: 42.9 %
ALBUMIN SERPL ELPH-MCNC: 3.3 G/DL — SIGNIFICANT CHANGE UP (ref 3.3–5.2)
ALBUMIN SERPL ELPH-MCNC: 3.3 G/DL — SIGNIFICANT CHANGE UP (ref 3.3–5.2)
ALBUMIN SERPL ELPH-MCNC: 3.4 G/DL
ALBUMIN SERPL ELPH-MCNC: 3.5 G/DL
ALBUMIN SERPL-MCNC: 2.9 G/DL
ALBUMIN/GLOB SERPL: 0.8 RATIO
ALLERGY+IMMUNOLOGY DIAG STUDY NOTE: SIGNIFICANT CHANGE UP
ALP BLD-CCNC: 75 U/L
ALP BLD-CCNC: 89 U/L
ALP BLD-CCNC: 92 U/L
ALP BLD-CCNC: 98 U/L
ALP SERPL-CCNC: 101 U/L — SIGNIFICANT CHANGE UP (ref 40–120)
ALP SERPL-CCNC: 101 U/L — SIGNIFICANT CHANGE UP (ref 40–120)
ALPHA1 GLOB MFR SERPL ELPH: 8.3 %
ALPHA1 GLOB SERPL ELPH-MCNC: 0.6 G/DL
ALPHA2 GLOB MFR SERPL ELPH: 15.5 %
ALPHA2 GLOB SERPL ELPH-MCNC: 1 G/DL
ALT FLD-CCNC: 5 U/L — SIGNIFICANT CHANGE UP
ALT FLD-CCNC: 5 U/L — SIGNIFICANT CHANGE UP
ALT SERPL-CCNC: 5 U/L
ALT SERPL-CCNC: 7 U/L
ALT SERPL-CCNC: 9 U/L
ALT SERPL-CCNC: <5 U/L
ANION GAP SERPL CALC-SCNC: 10 MMOL/L
ANION GAP SERPL CALC-SCNC: 11 MMOL/L — SIGNIFICANT CHANGE UP (ref 5–17)
ANION GAP SERPL CALC-SCNC: 11 MMOL/L — SIGNIFICANT CHANGE UP (ref 5–17)
ANION GAP SERPL CALC-SCNC: 12 MMOL/L
ANION GAP SERPL CALC-SCNC: 12 MMOL/L
ANION GAP SERPL CALC-SCNC: 12 MMOL/L — SIGNIFICANT CHANGE UP (ref 5–17)
ANION GAP SERPL CALC-SCNC: 12 MMOL/L — SIGNIFICANT CHANGE UP (ref 5–17)
ANION GAP SERPL CALC-SCNC: 9 MMOL/L
ANION GAP SERPL CALC-SCNC: 9 MMOL/L — SIGNIFICANT CHANGE UP (ref 5–17)
ANION GAP SERPL CALC-SCNC: 9 MMOL/L — SIGNIFICANT CHANGE UP (ref 5–17)
ANISOCYTOSIS BLD QL: SLIGHT — SIGNIFICANT CHANGE UP
ANISOCYTOSIS BLD QL: SLIGHT — SIGNIFICANT CHANGE UP
APPEARANCE UR: ABNORMAL
APPEARANCE UR: ABNORMAL
APPEARANCE UR: CLEAR — SIGNIFICANT CHANGE UP
APPEARANCE UR: CLEAR — SIGNIFICANT CHANGE UP
APTT BLD: 26.9 SEC — SIGNIFICANT CHANGE UP (ref 24.5–35.6)
APTT BLD: 26.9 SEC — SIGNIFICANT CHANGE UP (ref 24.5–35.6)
AST SERPL-CCNC: 11 U/L — SIGNIFICANT CHANGE UP
AST SERPL-CCNC: 11 U/L — SIGNIFICANT CHANGE UP
AST SERPL-CCNC: 12 U/L
AST SERPL-CCNC: 13 U/L
AST SERPL-CCNC: 9 U/L
AST SERPL-CCNC: 9 U/L
B-GLOBULIN MFR SERPL ELPH: 13 %
B-GLOBULIN SERPL ELPH-MCNC: 0.9 G/DL
BACTERIA # UR AUTO: ABNORMAL
BACTERIA SPT CULT: ABNORMAL
BASOPHILS # BLD AUTO: 0 K/UL — SIGNIFICANT CHANGE UP (ref 0–0.2)
BASOPHILS # BLD AUTO: 0.02 K/UL — SIGNIFICANT CHANGE UP (ref 0–0.2)
BASOPHILS # BLD AUTO: 0.02 K/UL — SIGNIFICANT CHANGE UP (ref 0–0.2)
BASOPHILS # BLD AUTO: 0.04 K/UL
BASOPHILS # BLD AUTO: 0.07 K/UL
BASOPHILS # BLD AUTO: 0.1 K/UL — SIGNIFICANT CHANGE UP (ref 0–0.2)
BASOPHILS NFR BLD AUTO: 0 % — SIGNIFICANT CHANGE UP (ref 0–2)
BASOPHILS NFR BLD AUTO: 0 % — SIGNIFICANT CHANGE UP (ref 0–2)
BASOPHILS NFR BLD AUTO: 0.3 % — SIGNIFICANT CHANGE UP (ref 0–2)
BASOPHILS NFR BLD AUTO: 0.3 % — SIGNIFICANT CHANGE UP (ref 0–2)
BASOPHILS NFR BLD AUTO: 0.9 %
BASOPHILS NFR BLD AUTO: 0.9 % — SIGNIFICANT CHANGE UP (ref 0–2)
BASOPHILS NFR BLD AUTO: 1.2 % — SIGNIFICANT CHANGE UP (ref 0–2)
BASOPHILS NFR BLD AUTO: 1.3 % — SIGNIFICANT CHANGE UP (ref 0–2)
BASOPHILS NFR BLD AUTO: 1.4 % — SIGNIFICANT CHANGE UP (ref 0–2)
BASOPHILS NFR BLD AUTO: 1.4 % — SIGNIFICANT CHANGE UP (ref 0–2)
BASOPHILS NFR BLD AUTO: 1.5 % — SIGNIFICANT CHANGE UP (ref 0–2)
BASOPHILS NFR BLD AUTO: 1.5 % — SIGNIFICANT CHANGE UP (ref 0–2)
BASOPHILS NFR BLD AUTO: 1.7 % — SIGNIFICANT CHANGE UP (ref 0–2)
BASOPHILS NFR BLD AUTO: 1.8 %
BASOPHILS NFR BLD AUTO: 1.8 % — SIGNIFICANT CHANGE UP (ref 0–2)
BASOPHILS NFR BLD AUTO: 2.4 % — HIGH (ref 0–2)
BASOPHILS NFR BLD AUTO: 2.5 % — HIGH (ref 0–2)
BILIRUB SERPL-MCNC: 0.2 MG/DL
BILIRUB SERPL-MCNC: 0.3 MG/DL
BILIRUB SERPL-MCNC: 0.4 MG/DL — SIGNIFICANT CHANGE UP (ref 0.4–2)
BILIRUB SERPL-MCNC: 0.4 MG/DL — SIGNIFICANT CHANGE UP (ref 0.4–2)
BILIRUB UR-MCNC: NEGATIVE — SIGNIFICANT CHANGE UP
BLD GP AB SCN SERPL QL: SIGNIFICANT CHANGE UP
BUN SERPL-MCNC: 15 MG/DL
BUN SERPL-MCNC: 16 MG/DL
BUN SERPL-MCNC: 19 MG/DL
BUN SERPL-MCNC: 21 MG/DL
BUN SERPL-MCNC: 22.8 MG/DL — HIGH (ref 8–20)
BUN SERPL-MCNC: 22.8 MG/DL — HIGH (ref 8–20)
BUN SERPL-MCNC: 24.9 MG/DL — HIGH (ref 8–20)
BUN SERPL-MCNC: 24.9 MG/DL — HIGH (ref 8–20)
BUN SERPL-MCNC: 25.5 MG/DL — HIGH (ref 8–20)
BUN SERPL-MCNC: 25.5 MG/DL — HIGH (ref 8–20)
CALCIUM SERPL-MCNC: 10.1 MG/DL — SIGNIFICANT CHANGE UP (ref 8.4–10.5)
CALCIUM SERPL-MCNC: 10.8 MG/DL — HIGH (ref 8.4–10.5)
CALCIUM SERPL-MCNC: 10.8 MG/DL — HIGH (ref 8.4–10.5)
CALCIUM SERPL-MCNC: 10.9 MG/DL
CALCIUM SERPL-MCNC: 11 MG/DL
CALCIUM SERPL-MCNC: 11 MG/DL
CALCIUM SERPL-MCNC: 11.1 MG/DL
CALCIUM SERPL-MCNC: 11.6 MG/DL
CHLORIDE SERPL-SCNC: 100 MMOL/L
CHLORIDE SERPL-SCNC: 101 MMOL/L
CHLORIDE SERPL-SCNC: 96 MMOL/L — SIGNIFICANT CHANGE UP (ref 96–108)
CHLORIDE SERPL-SCNC: 96 MMOL/L — SIGNIFICANT CHANGE UP (ref 96–108)
CHLORIDE SERPL-SCNC: 98 MMOL/L — SIGNIFICANT CHANGE UP (ref 96–108)
CHLORIDE SERPL-SCNC: 98 MMOL/L — SIGNIFICANT CHANGE UP (ref 96–108)
CHLORIDE SERPL-SCNC: 99 MMOL/L — SIGNIFICANT CHANGE UP (ref 96–108)
CHLORIDE SERPL-SCNC: 99 MMOL/L — SIGNIFICANT CHANGE UP (ref 96–108)
CHOLEST SERPL-MCNC: 132 MG/DL
CHOLEST SERPL-MCNC: 160 MG/DL
CO2 SERPL-SCNC: 25 MMOL/L
CO2 SERPL-SCNC: 25 MMOL/L — SIGNIFICANT CHANGE UP (ref 22–29)
CO2 SERPL-SCNC: 25 MMOL/L — SIGNIFICANT CHANGE UP (ref 22–29)
CO2 SERPL-SCNC: 26 MMOL/L
CO2 SERPL-SCNC: 27 MMOL/L — SIGNIFICANT CHANGE UP (ref 22–29)
CO2 SERPL-SCNC: 28 MMOL/L
CO2 SERPL-SCNC: 29 MMOL/L
COLOR SPEC: YELLOW — SIGNIFICANT CHANGE UP
COMMENT - URINE: SIGNIFICANT CHANGE UP
COMMENT - URINE: SIGNIFICANT CHANGE UP
CREAT SERPL-MCNC: 0.89 MG/DL
CREAT SERPL-MCNC: 0.9 MG/DL — SIGNIFICANT CHANGE UP (ref 0.5–1.3)
CREAT SERPL-MCNC: 0.9 MG/DL — SIGNIFICANT CHANGE UP (ref 0.5–1.3)
CREAT SERPL-MCNC: 0.93 MG/DL — SIGNIFICANT CHANGE UP (ref 0.5–1.3)
CREAT SERPL-MCNC: 0.93 MG/DL — SIGNIFICANT CHANGE UP (ref 0.5–1.3)
CREAT SERPL-MCNC: 0.95 MG/DL — SIGNIFICANT CHANGE UP (ref 0.5–1.3)
CREAT SERPL-MCNC: 0.95 MG/DL — SIGNIFICANT CHANGE UP (ref 0.5–1.3)
CREAT SERPL-MCNC: 1.09 MG/DL
CREAT SERPL-MCNC: 1.15 MG/DL
CREAT SERPL-MCNC: 1.16 MG/DL
CULTURE RESULTS: SIGNIFICANT CHANGE UP
DAT IGG-SP REAG RBC-IMP: ABNORMAL
DAT IGG-SP REAG RBC-IMP: ABNORMAL
DEPRECATED KAPPA LC FREE/LAMBDA SER: 1.37 RATIO
DIFF PNL FLD: ABNORMAL
DIR ANTIGLOB POLYSPECIFIC INTERPRETATION: ABNORMAL
DIR ANTIGLOB POLYSPECIFIC INTERPRETATION: ABNORMAL
DIR ANTIGLOB POLYSPECIFIC INTERPRETATION: SIGNIFICANT CHANGE UP
EGFR: 46 ML/MIN/1.73M2
EGFR: 47 ML/MIN/1.73M2
EGFR: 50 ML/MIN/1.73M2
EGFR: 59 ML/MIN/1.73M2 — LOW
EGFR: 59 ML/MIN/1.73M2 — LOW
EGFR: 61 ML/MIN/1.73M2 — SIGNIFICANT CHANGE UP
EGFR: 61 ML/MIN/1.73M2 — SIGNIFICANT CHANGE UP
EGFR: 63 ML/MIN/1.73M2 — SIGNIFICANT CHANGE UP
EGFR: 63 ML/MIN/1.73M2 — SIGNIFICANT CHANGE UP
EGFR: 64 ML/MIN/1.73M2
EOSINOPHIL # BLD AUTO: 0 K/UL — SIGNIFICANT CHANGE UP (ref 0–0.5)
EOSINOPHIL # BLD AUTO: 0.3 K/UL — SIGNIFICANT CHANGE UP (ref 0–0.5)
EOSINOPHIL # BLD AUTO: 0.33 K/UL
EOSINOPHIL # BLD AUTO: 0.4 K/UL — SIGNIFICANT CHANGE UP (ref 0–0.5)
EOSINOPHIL # BLD AUTO: 0.49 K/UL
EOSINOPHIL # BLD AUTO: 0.5 K/UL — SIGNIFICANT CHANGE UP (ref 0–0.5)
EOSINOPHIL NFR BLD AUTO: 0 % — SIGNIFICANT CHANGE UP (ref 0–6)
EOSINOPHIL NFR BLD AUTO: 10.3 % — HIGH (ref 0–6)
EOSINOPHIL NFR BLD AUTO: 10.4 % — HIGH (ref 0–6)
EOSINOPHIL NFR BLD AUTO: 10.5 %
EOSINOPHIL NFR BLD AUTO: 10.7 % — HIGH (ref 0–6)
EOSINOPHIL NFR BLD AUTO: 11.6 % — HIGH (ref 0–6)
EOSINOPHIL NFR BLD AUTO: 12.4 % — HIGH (ref 0–6)
EOSINOPHIL NFR BLD AUTO: 5.4 % — SIGNIFICANT CHANGE UP (ref 0–6)
EOSINOPHIL NFR BLD AUTO: 6.2 % — HIGH (ref 0–6)
EOSINOPHIL NFR BLD AUTO: 7.3 % — HIGH (ref 0–6)
EOSINOPHIL NFR BLD AUTO: 7.3 % — HIGH (ref 0–6)
EOSINOPHIL NFR BLD AUTO: 8.4 %
EOSINOPHIL NFR BLD AUTO: 8.5 % — HIGH (ref 0–6)
EOSINOPHIL NFR BLD AUTO: 9.4 % — HIGH (ref 0–6)
EOSINOPHIL NFR BLD AUTO: 9.6 % — HIGH (ref 0–6)
EOSINOPHIL NFR BLD AUTO: 9.9 % — HIGH (ref 0–6)
EPI CELLS # UR: NEGATIVE — SIGNIFICANT CHANGE UP
EPI CELLS # UR: NEGATIVE — SIGNIFICANT CHANGE UP
EPI CELLS # UR: SIGNIFICANT CHANGE UP
EPI CELLS # UR: SIGNIFICANT CHANGE UP
FERRITIN SERPL-MCNC: 100 NG/ML
FERRITIN SERPL-MCNC: 47 NG/ML
FERRITIN SERPL-MCNC: 51 NG/ML
FERRITIN SERPL-MCNC: 91 NG/ML — SIGNIFICANT CHANGE UP (ref 13–330)
FERRITIN SERPL-MCNC: 91 NG/ML — SIGNIFICANT CHANGE UP (ref 13–330)
FOLATE SERPL-MCNC: 10.9 NG/ML
FOLATE SERPL-MCNC: 12 NG/ML
FUNGITELL: <31 PG/ML — SIGNIFICANT CHANGE UP
FUNGITELL: <31 PG/ML — SIGNIFICANT CHANGE UP
GALACTOMANNAN AG SERPL-ACNC: 0.17 INDEX — SIGNIFICANT CHANGE UP (ref 0–0.49)
GALACTOMANNAN AG SERPL-ACNC: 0.17 INDEX — SIGNIFICANT CHANGE UP (ref 0–0.49)
GAMMA GLOB FLD ELPH-MCNC: 1.4 G/DL
GAMMA GLOB MFR SERPL ELPH: 20.3 %
GIANT PLATELETS BLD QL SMEAR: PRESENT — SIGNIFICANT CHANGE UP
GIANT PLATELETS BLD QL SMEAR: PRESENT — SIGNIFICANT CHANGE UP
GLUCOSE SERPL-MCNC: 111 MG/DL — HIGH (ref 70–99)
GLUCOSE SERPL-MCNC: 111 MG/DL — HIGH (ref 70–99)
GLUCOSE SERPL-MCNC: 62 MG/DL — LOW (ref 70–99)
GLUCOSE SERPL-MCNC: 62 MG/DL — LOW (ref 70–99)
GLUCOSE SERPL-MCNC: 79 MG/DL — SIGNIFICANT CHANGE UP (ref 70–99)
GLUCOSE SERPL-MCNC: 79 MG/DL — SIGNIFICANT CHANGE UP (ref 70–99)
GLUCOSE SERPL-MCNC: 84 MG/DL
GLUCOSE SERPL-MCNC: 93 MG/DL
GLUCOSE SERPL-MCNC: 95 MG/DL
GLUCOSE SERPL-MCNC: 99 MG/DL
GLUCOSE UR QL: NEGATIVE MG/DL — SIGNIFICANT CHANGE UP
GLUCOSE UR QL: NEGATIVE MG/DL — SIGNIFICANT CHANGE UP
GLUCOSE UR QL: NEGATIVE — SIGNIFICANT CHANGE UP
GLUCOSE UR QL: NEGATIVE — SIGNIFICANT CHANGE UP
HCT VFR BLD CALC: 24.1 % — LOW (ref 34.5–45)
HCT VFR BLD CALC: 24.1 % — LOW (ref 34.5–45)
HCT VFR BLD CALC: 24.7 % — LOW (ref 34.5–45)
HCT VFR BLD CALC: 25.1 % — LOW (ref 34.5–45)
HCT VFR BLD CALC: 25.1 % — LOW (ref 34.5–45)
HCT VFR BLD CALC: 25.4 % — LOW (ref 34.5–45)
HCT VFR BLD CALC: 25.6 % — LOW (ref 34.5–45)
HCT VFR BLD CALC: 25.6 % — LOW (ref 34.5–45)
HCT VFR BLD CALC: 25.7 % — LOW (ref 34.5–45)
HCT VFR BLD CALC: 26 % — LOW (ref 34.5–45)
HCT VFR BLD CALC: 26.2 % — LOW (ref 34.5–45)
HCT VFR BLD CALC: 26.4 % — LOW (ref 34.5–45)
HCT VFR BLD CALC: 26.5 % — LOW (ref 34.5–45)
HCT VFR BLD CALC: 26.8 % — LOW (ref 34.5–45)
HCT VFR BLD CALC: 27 % — LOW (ref 34.5–45)
HCT VFR BLD CALC: 27.9 % — LOW (ref 34.5–45)
HCT VFR BLD CALC: 28 %
HCT VFR BLD CALC: 28.4 %
HCT VFR BLD CALC: 28.4 % — LOW (ref 34.5–45)
HCT VFR BLD CALC: 29 % — LOW (ref 34.5–45)
HCT VFR BLD CALC: 29 % — LOW (ref 34.5–45)
HDLC SERPL-MCNC: 53 MG/DL
HDLC SERPL-MCNC: 53 MG/DL
HGB BLD-MCNC: 7.1 G/DL — LOW (ref 11.5–15.5)
HGB BLD-MCNC: 7.1 G/DL — LOW (ref 11.5–15.5)
HGB BLD-MCNC: 7.6 G/DL — LOW (ref 11.5–15.5)
HGB BLD-MCNC: 7.7 G/DL — LOW (ref 11.5–15.5)
HGB BLD-MCNC: 7.7 G/DL — LOW (ref 11.5–15.5)
HGB BLD-MCNC: 8.2 G/DL — LOW (ref 11.5–15.5)
HGB BLD-MCNC: 8.3 G/DL
HGB BLD-MCNC: 8.3 G/DL — LOW (ref 11.5–15.5)
HGB BLD-MCNC: 8.3 G/DL — LOW (ref 11.5–15.5)
HGB BLD-MCNC: 8.4 G/DL
HGB BLD-MCNC: 8.5 G/DL — LOW (ref 11.5–15.5)
HGB BLD-MCNC: 8.5 G/DL — LOW (ref 11.5–15.5)
HGB BLD-MCNC: 8.6 G/DL — LOW (ref 11.5–15.5)
HGB BLD-MCNC: 8.7 G/DL — LOW (ref 11.5–15.5)
HGB BLD-MCNC: 8.8 G/DL — LOW (ref 11.5–15.5)
HGB BLD-MCNC: 8.8 G/DL — LOW (ref 11.5–15.5)
HGB BLD-MCNC: 9.1 G/DL — LOW (ref 11.5–15.5)
HGB BLD-MCNC: 9.2 G/DL — LOW (ref 11.5–15.5)
HGB BLD-MCNC: 9.3 G/DL — LOW (ref 11.5–15.5)
IAT COMP-SP REAG SERPL QL: SIGNIFICANT CHANGE UP
IAT COMP-SP REAG SERPL QL: SIGNIFICANT CHANGE UP
IGA SER QL IEP: 210 MG/DL
IGE SERPL-ACNC: 387 KU/L — HIGH
IGE SERPL-ACNC: 387 KU/L — HIGH
IGG SER QL IEP: 1539 MG/DL
IGM SER QL IEP: 172 MG/DL
IMM GRANULOCYTES NFR BLD AUTO: 0.3 %
IMM GRANULOCYTES NFR BLD AUTO: 0.4 %
IMM GRANULOCYTES NFR BLD AUTO: 0.8 % — SIGNIFICANT CHANGE UP (ref 0–0.9)
IMM GRANULOCYTES NFR BLD AUTO: 0.8 % — SIGNIFICANT CHANGE UP (ref 0–0.9)
INR BLD: 1.07 RATIO — SIGNIFICANT CHANGE UP (ref 0.85–1.18)
INR BLD: 1.07 RATIO — SIGNIFICANT CHANGE UP (ref 0.85–1.18)
INTERPRETATION SERPL IEP-IMP: NORMAL
IRON SATN MFR SERPL: 13 UG/DL — LOW (ref 37–145)
IRON SATN MFR SERPL: 13 UG/DL — LOW (ref 37–145)
IRON SATN MFR SERPL: 17 %
IRON SATN MFR SERPL: 6 %
IRON SATN MFR SERPL: 6 % — LOW (ref 14–50)
IRON SATN MFR SERPL: 6 % — LOW (ref 14–50)
IRON SATN MFR SERPL: 7 %
IRON SERPL-MCNC: 14 UG/DL
IRON SERPL-MCNC: 18 UG/DL
IRON SERPL-MCNC: 38 UG/DL
KAPPA LC CSF-MCNC: 5.22 MG/DL
KAPPA LC SERPL-MCNC: 7.13 MG/DL
KETONES UR-MCNC: NEGATIVE — SIGNIFICANT CHANGE UP
LDLC SERPL CALC-MCNC: 61 MG/DL
LDLC SERPL CALC-MCNC: 91 MG/DL
LEUKOCYTE ESTERASE UR-ACNC: ABNORMAL
LIDOCAIN IGE QN: 10 U/L — LOW (ref 22–51)
LIDOCAIN IGE QN: 10 U/L — LOW (ref 22–51)
LYMPHOCYTES # BLD AUTO: 0.22 K/UL — LOW (ref 1–3.3)
LYMPHOCYTES # BLD AUTO: 0.22 K/UL — LOW (ref 1–3.3)
LYMPHOCYTES # BLD AUTO: 0.44 K/UL — LOW (ref 1–3.3)
LYMPHOCYTES # BLD AUTO: 0.44 K/UL — LOW (ref 1–3.3)
LYMPHOCYTES # BLD AUTO: 0.5 K/UL — LOW (ref 1–3.3)
LYMPHOCYTES # BLD AUTO: 0.6 K/UL — LOW (ref 1–3.3)
LYMPHOCYTES # BLD AUTO: 0.7 K/UL — LOW (ref 1–3.3)
LYMPHOCYTES # BLD AUTO: 0.8 K/UL — LOW (ref 1–3.3)
LYMPHOCYTES # BLD AUTO: 0.8 K/UL — LOW (ref 1–3.3)
LYMPHOCYTES # BLD AUTO: 0.82 K/UL
LYMPHOCYTES # BLD AUTO: 0.85 K/UL
LYMPHOCYTES # BLD AUTO: 0.9 K/UL — LOW (ref 1–3.3)
LYMPHOCYTES # BLD AUTO: 0.9 K/UL — LOW (ref 1–3.3)
LYMPHOCYTES # BLD AUTO: 1 K/UL — SIGNIFICANT CHANGE UP (ref 1–3.3)
LYMPHOCYTES # BLD AUTO: 1 K/UL — SIGNIFICANT CHANGE UP (ref 1–3.3)
LYMPHOCYTES # BLD AUTO: 12.1 % — LOW (ref 13–44)
LYMPHOCYTES # BLD AUTO: 14.4 % — SIGNIFICANT CHANGE UP (ref 13–44)
LYMPHOCYTES # BLD AUTO: 14.4 % — SIGNIFICANT CHANGE UP (ref 13–44)
LYMPHOCYTES # BLD AUTO: 15.8 % — SIGNIFICANT CHANGE UP (ref 13–44)
LYMPHOCYTES # BLD AUTO: 16.7 % — SIGNIFICANT CHANGE UP (ref 13–44)
LYMPHOCYTES # BLD AUTO: 17.1 % — SIGNIFICANT CHANGE UP (ref 13–44)
LYMPHOCYTES # BLD AUTO: 18.1 % — SIGNIFICANT CHANGE UP (ref 13–44)
LYMPHOCYTES # BLD AUTO: 18.1 % — SIGNIFICANT CHANGE UP (ref 13–44)
LYMPHOCYTES # BLD AUTO: 18.2 % — SIGNIFICANT CHANGE UP (ref 13–44)
LYMPHOCYTES # BLD AUTO: 19.9 % — SIGNIFICANT CHANGE UP (ref 13–44)
LYMPHOCYTES # BLD AUTO: 2.7 % — LOW (ref 13–44)
LYMPHOCYTES # BLD AUTO: 2.7 % — LOW (ref 13–44)
LYMPHOCYTES # BLD AUTO: 20.6 % — SIGNIFICANT CHANGE UP (ref 13–44)
LYMPHOCYTES # BLD AUTO: 20.8 % — SIGNIFICANT CHANGE UP (ref 13–44)
LYMPHOCYTES # BLD AUTO: 21.1 % — SIGNIFICANT CHANGE UP (ref 13–44)
LYMPHOCYTES # BLD AUTO: 7 % — LOW (ref 13–44)
LYMPHOCYTES # BLD AUTO: 7 % — LOW (ref 13–44)
LYMPHOCYTES NFR BLD AUTO: 18.3 %
LYMPHOCYTES NFR BLD AUTO: 21 %
M PROTEIN MFR SERPL ELPH: NORMAL
M PROTEIN SPEC IFE-MCNC: NORMAL
MACROCYTES BLD QL: SLIGHT — SIGNIFICANT CHANGE UP
MACROCYTES BLD QL: SLIGHT — SIGNIFICANT CHANGE UP
MAGNESIUM SERPL-MCNC: 2 MG/DL — SIGNIFICANT CHANGE UP (ref 1.6–2.6)
MAGNESIUM SERPL-MCNC: 2 MG/DL — SIGNIFICANT CHANGE UP (ref 1.6–2.6)
MAGNESIUM SERPL-MCNC: 2 MG/DL — SIGNIFICANT CHANGE UP (ref 1.8–2.6)
MAGNESIUM SERPL-MCNC: 2 MG/DL — SIGNIFICANT CHANGE UP (ref 1.8–2.6)
MAN DIFF?: NORMAL
MAN DIFF?: NORMAL
MANUAL SMEAR VERIFICATION: SIGNIFICANT CHANGE UP
MANUAL SMEAR VERIFICATION: SIGNIFICANT CHANGE UP
MCHC RBC-ENTMCNC: 27.1 PG — SIGNIFICANT CHANGE UP (ref 27–34)
MCHC RBC-ENTMCNC: 27.1 PG — SIGNIFICANT CHANGE UP (ref 27–34)
MCHC RBC-ENTMCNC: 27.4 PG — SIGNIFICANT CHANGE UP (ref 27–34)
MCHC RBC-ENTMCNC: 27.5 PG
MCHC RBC-ENTMCNC: 27.6 PG — SIGNIFICANT CHANGE UP (ref 27–34)
MCHC RBC-ENTMCNC: 27.7 PG
MCHC RBC-ENTMCNC: 27.9 PG — SIGNIFICANT CHANGE UP (ref 27–34)
MCHC RBC-ENTMCNC: 28 PG — SIGNIFICANT CHANGE UP (ref 27–34)
MCHC RBC-ENTMCNC: 28 PG — SIGNIFICANT CHANGE UP (ref 27–34)
MCHC RBC-ENTMCNC: 28.1 PG — SIGNIFICANT CHANGE UP (ref 27–34)
MCHC RBC-ENTMCNC: 28.2 PG — SIGNIFICANT CHANGE UP (ref 27–34)
MCHC RBC-ENTMCNC: 28.2 PG — SIGNIFICANT CHANGE UP (ref 27–34)
MCHC RBC-ENTMCNC: 28.7 PG — SIGNIFICANT CHANGE UP (ref 27–34)
MCHC RBC-ENTMCNC: 28.7 PG — SIGNIFICANT CHANGE UP (ref 27–34)
MCHC RBC-ENTMCNC: 28.8 PG — SIGNIFICANT CHANGE UP (ref 27–34)
MCHC RBC-ENTMCNC: 28.8 PG — SIGNIFICANT CHANGE UP (ref 27–34)
MCHC RBC-ENTMCNC: 29 PG — SIGNIFICANT CHANGE UP (ref 27–34)
MCHC RBC-ENTMCNC: 29.4 PG — SIGNIFICANT CHANGE UP (ref 27–34)
MCHC RBC-ENTMCNC: 29.5 GM/DL — LOW (ref 32–36)
MCHC RBC-ENTMCNC: 29.5 GM/DL — LOW (ref 32–36)
MCHC RBC-ENTMCNC: 29.6 GM/DL
MCHC RBC-ENTMCNC: 29.6 GM/DL
MCHC RBC-ENTMCNC: 30.3 GM/DL — LOW (ref 32–36)
MCHC RBC-ENTMCNC: 30.3 GM/DL — LOW (ref 32–36)
MCHC RBC-ENTMCNC: 30.5 G/DL — LOW (ref 32–36)
MCHC RBC-ENTMCNC: 30.9 G/DL — LOW (ref 32–36)
MCHC RBC-ENTMCNC: 31.2 G/DL — LOW (ref 32–36)
MCHC RBC-ENTMCNC: 31.3 G/DL — LOW (ref 32–36)
MCHC RBC-ENTMCNC: 31.5 G/DL — LOW (ref 32–36)
MCHC RBC-ENTMCNC: 31.5 GM/DL — LOW (ref 32–36)
MCHC RBC-ENTMCNC: 31.5 GM/DL — LOW (ref 32–36)
MCHC RBC-ENTMCNC: 31.8 G/DL — LOW (ref 32–36)
MCHC RBC-ENTMCNC: 32 G/DL — SIGNIFICANT CHANGE UP (ref 32–36)
MCHC RBC-ENTMCNC: 32 G/DL — SIGNIFICANT CHANGE UP (ref 32–36)
MCHC RBC-ENTMCNC: 32.6 G/DL — SIGNIFICANT CHANGE UP (ref 32–36)
MCHC RBC-ENTMCNC: 32.8 G/DL — SIGNIFICANT CHANGE UP (ref 32–36)
MCHC RBC-ENTMCNC: 32.9 G/DL — SIGNIFICANT CHANGE UP (ref 32–36)
MCHC RBC-ENTMCNC: 33 G/DL — SIGNIFICANT CHANGE UP (ref 32–36)
MCHC RBC-ENTMCNC: 33 G/DL — SIGNIFICANT CHANGE UP (ref 32–36)
MCV RBC AUTO: 85 FL — SIGNIFICANT CHANGE UP (ref 80–100)
MCV RBC AUTO: 86.2 FL — SIGNIFICANT CHANGE UP (ref 80–100)
MCV RBC AUTO: 87.2 FL — SIGNIFICANT CHANGE UP (ref 80–100)
MCV RBC AUTO: 87.3 FL — SIGNIFICANT CHANGE UP (ref 80–100)
MCV RBC AUTO: 88.4 FL — SIGNIFICANT CHANGE UP (ref 80–100)
MCV RBC AUTO: 88.6 FL — SIGNIFICANT CHANGE UP (ref 80–100)
MCV RBC AUTO: 88.6 FL — SIGNIFICANT CHANGE UP (ref 80–100)
MCV RBC AUTO: 88.7 FL — SIGNIFICANT CHANGE UP (ref 80–100)
MCV RBC AUTO: 88.9 FL — SIGNIFICANT CHANGE UP (ref 80–100)
MCV RBC AUTO: 89.1 FL — SIGNIFICANT CHANGE UP (ref 80–100)
MCV RBC AUTO: 89.3 FL — SIGNIFICANT CHANGE UP (ref 80–100)
MCV RBC AUTO: 89.5 FL — SIGNIFICANT CHANGE UP (ref 80–100)
MCV RBC AUTO: 89.6 FL — SIGNIFICANT CHANGE UP (ref 80–100)
MCV RBC AUTO: 90.4 FL — SIGNIFICANT CHANGE UP (ref 80–100)
MCV RBC AUTO: 92 FL — SIGNIFICANT CHANGE UP (ref 80–100)
MCV RBC AUTO: 92 FL — SIGNIFICANT CHANGE UP (ref 80–100)
MCV RBC AUTO: 92.6 FL — SIGNIFICANT CHANGE UP (ref 80–100)
MCV RBC AUTO: 92.6 FL — SIGNIFICANT CHANGE UP (ref 80–100)
MCV RBC AUTO: 92.7 FL
MCV RBC AUTO: 93.7 FL
MCV RBC AUTO: 94.2 FL — SIGNIFICANT CHANGE UP (ref 80–100)
MONOCLON BAND OBS SERPL: NORMAL
MONOCYTES # BLD AUTO: 0.29 K/UL — SIGNIFICANT CHANGE UP (ref 0–0.9)
MONOCYTES # BLD AUTO: 0.29 K/UL — SIGNIFICANT CHANGE UP (ref 0–0.9)
MONOCYTES # BLD AUTO: 0.3 K/UL — SIGNIFICANT CHANGE UP (ref 0–0.9)
MONOCYTES # BLD AUTO: 0.4 K/UL — SIGNIFICANT CHANGE UP (ref 0–0.9)
MONOCYTES # BLD AUTO: 0.46 K/UL — SIGNIFICANT CHANGE UP (ref 0–0.9)
MONOCYTES # BLD AUTO: 0.46 K/UL — SIGNIFICANT CHANGE UP (ref 0–0.9)
MONOCYTES # BLD AUTO: 0.5 K/UL — SIGNIFICANT CHANGE UP (ref 0–0.9)
MONOCYTES # BLD AUTO: 0.5 K/UL — SIGNIFICANT CHANGE UP (ref 0–0.9)
MONOCYTES # BLD AUTO: 0.51 K/UL
MONOCYTES # BLD AUTO: 0.51 K/UL
MONOCYTES # BLD AUTO: 0.6 K/UL — SIGNIFICANT CHANGE UP (ref 0–0.9)
MONOCYTES # BLD AUTO: 0.6 K/UL — SIGNIFICANT CHANGE UP (ref 0–0.9)
MONOCYTES NFR BLD AUTO: 10 % — SIGNIFICANT CHANGE UP (ref 2–14)
MONOCYTES NFR BLD AUTO: 10.2 % — SIGNIFICANT CHANGE UP (ref 2–14)
MONOCYTES NFR BLD AUTO: 10.3 % — SIGNIFICANT CHANGE UP (ref 2–14)
MONOCYTES NFR BLD AUTO: 10.6 % — SIGNIFICANT CHANGE UP (ref 2–14)
MONOCYTES NFR BLD AUTO: 11 %
MONOCYTES NFR BLD AUTO: 11.2 % — SIGNIFICANT CHANGE UP (ref 2–14)
MONOCYTES NFR BLD AUTO: 12.2 % — SIGNIFICANT CHANGE UP (ref 2–14)
MONOCYTES NFR BLD AUTO: 13 %
MONOCYTES NFR BLD AUTO: 3.5 % — SIGNIFICANT CHANGE UP (ref 2–14)
MONOCYTES NFR BLD AUTO: 3.5 % — SIGNIFICANT CHANGE UP (ref 2–14)
MONOCYTES NFR BLD AUTO: 7.3 % — SIGNIFICANT CHANGE UP (ref 2–14)
MONOCYTES NFR BLD AUTO: 7.3 % — SIGNIFICANT CHANGE UP (ref 2–14)
MONOCYTES NFR BLD AUTO: 8 % — SIGNIFICANT CHANGE UP (ref 2–14)
MONOCYTES NFR BLD AUTO: 8 % — SIGNIFICANT CHANGE UP (ref 2–14)
MONOCYTES NFR BLD AUTO: 8.8 % — SIGNIFICANT CHANGE UP (ref 2–14)
MONOCYTES NFR BLD AUTO: 9.3 % — SIGNIFICANT CHANGE UP (ref 2–14)
MONOCYTES NFR BLD AUTO: 9.4 % — SIGNIFICANT CHANGE UP (ref 2–14)
MONOCYTES NFR BLD AUTO: 9.5 % — SIGNIFICANT CHANGE UP (ref 2–14)
MONOCYTES NFR BLD AUTO: 9.9 % — SIGNIFICANT CHANGE UP (ref 2–14)
NEUTROPHILS # BLD AUTO: 2.17 K/UL
NEUTROPHILS # BLD AUTO: 2.3 K/UL — SIGNIFICANT CHANGE UP (ref 1.8–7.4)
NEUTROPHILS # BLD AUTO: 2.5 K/UL — SIGNIFICANT CHANGE UP (ref 1.8–7.4)
NEUTROPHILS # BLD AUTO: 2.5 K/UL — SIGNIFICANT CHANGE UP (ref 1.8–7.4)
NEUTROPHILS # BLD AUTO: 2.7 K/UL — SIGNIFICANT CHANGE UP (ref 1.8–7.4)
NEUTROPHILS # BLD AUTO: 2.7 K/UL — SIGNIFICANT CHANGE UP (ref 1.8–7.4)
NEUTROPHILS # BLD AUTO: 2.74 K/UL
NEUTROPHILS # BLD AUTO: 2.8 K/UL — SIGNIFICANT CHANGE UP (ref 1.8–7.4)
NEUTROPHILS # BLD AUTO: 2.8 K/UL — SIGNIFICANT CHANGE UP (ref 1.8–7.4)
NEUTROPHILS # BLD AUTO: 3 K/UL — SIGNIFICANT CHANGE UP (ref 1.8–7.4)
NEUTROPHILS # BLD AUTO: 3 K/UL — SIGNIFICANT CHANGE UP (ref 1.8–7.4)
NEUTROPHILS # BLD AUTO: 3.1 K/UL — SIGNIFICANT CHANGE UP (ref 1.8–7.4)
NEUTROPHILS # BLD AUTO: 3.5 K/UL — SIGNIFICANT CHANGE UP (ref 1.8–7.4)
NEUTROPHILS # BLD AUTO: 5.34 K/UL — SIGNIFICANT CHANGE UP (ref 1.8–7.4)
NEUTROPHILS # BLD AUTO: 5.34 K/UL — SIGNIFICANT CHANGE UP (ref 1.8–7.4)
NEUTROPHILS # BLD AUTO: 7.71 K/UL — HIGH (ref 1.8–7.4)
NEUTROPHILS # BLD AUTO: 7.71 K/UL — HIGH (ref 1.8–7.4)
NEUTROPHILS NFR BLD AUTO: 55.5 %
NEUTROPHILS NFR BLD AUTO: 55.6 % — SIGNIFICANT CHANGE UP (ref 43–77)
NEUTROPHILS NFR BLD AUTO: 56.6 % — SIGNIFICANT CHANGE UP (ref 43–77)
NEUTROPHILS NFR BLD AUTO: 57.2 % — SIGNIFICANT CHANGE UP (ref 43–77)
NEUTROPHILS NFR BLD AUTO: 58.8 % — SIGNIFICANT CHANGE UP (ref 43–77)
NEUTROPHILS NFR BLD AUTO: 58.9 %
NEUTROPHILS NFR BLD AUTO: 59 % — SIGNIFICANT CHANGE UP (ref 43–77)
NEUTROPHILS NFR BLD AUTO: 62.1 % — SIGNIFICANT CHANGE UP (ref 43–77)
NEUTROPHILS NFR BLD AUTO: 62.5 % — SIGNIFICANT CHANGE UP (ref 43–77)
NEUTROPHILS NFR BLD AUTO: 62.8 % — SIGNIFICANT CHANGE UP (ref 43–77)
NEUTROPHILS NFR BLD AUTO: 64 % — SIGNIFICANT CHANGE UP (ref 43–77)
NEUTROPHILS NFR BLD AUTO: 65.1 % — SIGNIFICANT CHANGE UP (ref 43–77)
NEUTROPHILS NFR BLD AUTO: 65.5 % — SIGNIFICANT CHANGE UP (ref 43–77)
NEUTROPHILS NFR BLD AUTO: 67.4 % — SIGNIFICANT CHANGE UP (ref 43–77)
NEUTROPHILS NFR BLD AUTO: 68.2 % — SIGNIFICANT CHANGE UP (ref 43–77)
NEUTROPHILS NFR BLD AUTO: 84.6 % — HIGH (ref 43–77)
NEUTROPHILS NFR BLD AUTO: 84.6 % — HIGH (ref 43–77)
NEUTROPHILS NFR BLD AUTO: 92 % — HIGH (ref 43–77)
NEUTROPHILS NFR BLD AUTO: 92 % — HIGH (ref 43–77)
NEUTS BAND # BLD: 1.8 % — SIGNIFICANT CHANGE UP (ref 0–8)
NEUTS BAND # BLD: 1.8 % — SIGNIFICANT CHANGE UP (ref 0–8)
NITRITE UR-MCNC: NEGATIVE — SIGNIFICANT CHANGE UP
NITRITE UR-MCNC: NEGATIVE — SIGNIFICANT CHANGE UP
NITRITE UR-MCNC: POSITIVE
NITRITE UR-MCNC: POSITIVE
NONHDLC SERPL-MCNC: 107 MG/DL
NONHDLC SERPL-MCNC: 79 MG/DL
OVALOCYTES BLD QL SMEAR: SLIGHT — SIGNIFICANT CHANGE UP
OVALOCYTES BLD QL SMEAR: SLIGHT — SIGNIFICANT CHANGE UP
PARATHYROID HORMONE INTACT: 66 PG/ML
PH UR: 5 — SIGNIFICANT CHANGE UP (ref 5–8)
PH UR: 5 — SIGNIFICANT CHANGE UP (ref 5–8)
PH UR: 6 — SIGNIFICANT CHANGE UP (ref 5–8)
PH UR: 6 — SIGNIFICANT CHANGE UP (ref 5–8)
PHOSPHATE SERPL-MCNC: 2.8 MG/DL — SIGNIFICANT CHANGE UP (ref 2.4–4.7)
PHOSPHATE SERPL-MCNC: 2.8 MG/DL — SIGNIFICANT CHANGE UP (ref 2.4–4.7)
PLAT MORPH BLD: NORMAL — SIGNIFICANT CHANGE UP
PLAT MORPH BLD: NORMAL — SIGNIFICANT CHANGE UP
PLATELET # BLD AUTO: 250 K/UL — SIGNIFICANT CHANGE UP (ref 150–400)
PLATELET # BLD AUTO: 251 K/UL — SIGNIFICANT CHANGE UP (ref 150–400)
PLATELET # BLD AUTO: 281 K/UL — SIGNIFICANT CHANGE UP (ref 150–400)
PLATELET # BLD AUTO: 282 K/UL — SIGNIFICANT CHANGE UP (ref 150–400)
PLATELET # BLD AUTO: 289 K/UL — SIGNIFICANT CHANGE UP (ref 150–400)
PLATELET # BLD AUTO: 292 K/UL — SIGNIFICANT CHANGE UP (ref 150–400)
PLATELET # BLD AUTO: 292 K/UL — SIGNIFICANT CHANGE UP (ref 150–400)
PLATELET # BLD AUTO: 310 K/UL — SIGNIFICANT CHANGE UP (ref 150–400)
PLATELET # BLD AUTO: 314 K/UL
PLATELET # BLD AUTO: 317 K/UL — SIGNIFICANT CHANGE UP (ref 150–400)
PLATELET # BLD AUTO: 319 K/UL — SIGNIFICANT CHANGE UP (ref 150–400)
PLATELET # BLD AUTO: 327 K/UL — SIGNIFICANT CHANGE UP (ref 150–400)
PLATELET # BLD AUTO: 329 K/UL
PLATELET # BLD AUTO: 332 K/UL — SIGNIFICANT CHANGE UP (ref 150–400)
PLATELET # BLD AUTO: 334 K/UL — SIGNIFICANT CHANGE UP (ref 150–400)
PLATELET # BLD AUTO: 353 K/UL — SIGNIFICANT CHANGE UP (ref 150–400)
PLATELET # BLD AUTO: 369 K/UL — SIGNIFICANT CHANGE UP (ref 150–400)
PLATELET # BLD AUTO: 369 K/UL — SIGNIFICANT CHANGE UP (ref 150–400)
PLATELET # BLD AUTO: 385 K/UL — SIGNIFICANT CHANGE UP (ref 150–400)
POIKILOCYTOSIS BLD QL AUTO: SLIGHT — SIGNIFICANT CHANGE UP
POIKILOCYTOSIS BLD QL AUTO: SLIGHT — SIGNIFICANT CHANGE UP
POLYCHROMASIA BLD QL SMEAR: SLIGHT — SIGNIFICANT CHANGE UP
POLYCHROMASIA BLD QL SMEAR: SLIGHT — SIGNIFICANT CHANGE UP
POTASSIUM SERPL-MCNC: 4 MMOL/L — SIGNIFICANT CHANGE UP (ref 3.5–5.3)
POTASSIUM SERPL-MCNC: 4 MMOL/L — SIGNIFICANT CHANGE UP (ref 3.5–5.3)
POTASSIUM SERPL-MCNC: 4.2 MMOL/L — SIGNIFICANT CHANGE UP (ref 3.5–5.3)
POTASSIUM SERPL-MCNC: 4.2 MMOL/L — SIGNIFICANT CHANGE UP (ref 3.5–5.3)
POTASSIUM SERPL-MCNC: 4.6 MMOL/L — SIGNIFICANT CHANGE UP (ref 3.5–5.3)
POTASSIUM SERPL-MCNC: 4.6 MMOL/L — SIGNIFICANT CHANGE UP (ref 3.5–5.3)
POTASSIUM SERPL-SCNC: 4 MMOL/L — SIGNIFICANT CHANGE UP (ref 3.5–5.3)
POTASSIUM SERPL-SCNC: 4 MMOL/L — SIGNIFICANT CHANGE UP (ref 3.5–5.3)
POTASSIUM SERPL-SCNC: 4.2 MMOL/L — SIGNIFICANT CHANGE UP (ref 3.5–5.3)
POTASSIUM SERPL-SCNC: 4.2 MMOL/L — SIGNIFICANT CHANGE UP (ref 3.5–5.3)
POTASSIUM SERPL-SCNC: 4.6 MMOL/L — SIGNIFICANT CHANGE UP (ref 3.5–5.3)
POTASSIUM SERPL-SCNC: 4.6 MMOL/L — SIGNIFICANT CHANGE UP (ref 3.5–5.3)
POTASSIUM SERPL-SCNC: 4.7 MMOL/L
POTASSIUM SERPL-SCNC: 4.8 MMOL/L
POTASSIUM SERPL-SCNC: 4.9 MMOL/L
POTASSIUM SERPL-SCNC: 5.1 MMOL/L
PROT SERPL-MCNC: 6.7 G/DL
PROT SERPL-MCNC: 6.9 G/DL
PROT SERPL-MCNC: 7 G/DL
PROT SERPL-MCNC: 7.7 G/DL — SIGNIFICANT CHANGE UP (ref 6.6–8.7)
PROT SERPL-MCNC: 7.7 G/DL — SIGNIFICANT CHANGE UP (ref 6.6–8.7)
PROT UR-MCNC: 30 MG/DL
PROTHROM AB SERPL-ACNC: 11.9 SEC — SIGNIFICANT CHANGE UP (ref 9.5–13)
PROTHROM AB SERPL-ACNC: 11.9 SEC — SIGNIFICANT CHANGE UP (ref 9.5–13)
RBC # BLD: 2.62 M/UL — LOW (ref 3.8–5.2)
RBC # BLD: 2.62 M/UL — LOW (ref 3.8–5.2)
RBC # BLD: 2.71 M/UL — LOW (ref 3.8–5.2)
RBC # BLD: 2.71 M/UL — LOW (ref 3.8–5.2)
RBC # BLD: 2.78 M/UL — LOW (ref 3.8–5.2)
RBC # BLD: 2.84 M/UL — LOW (ref 3.8–5.2)
RBC # BLD: 2.92 M/UL — LOW (ref 3.8–5.2)
RBC # BLD: 2.93 M/UL
RBC # BLD: 2.95 M/UL — LOW (ref 3.8–5.2)
RBC # BLD: 2.95 M/UL — LOW (ref 3.8–5.2)
RBC # BLD: 2.96 M/UL — LOW (ref 3.8–5.2)
RBC # BLD: 2.98 M/UL — LOW (ref 3.8–5.2)
RBC # BLD: 3 M/UL — LOW (ref 3.8–5.2)
RBC # BLD: 3.02 M/UL
RBC # BLD: 3.02 M/UL — LOW (ref 3.8–5.2)
RBC # BLD: 3.03 M/UL
RBC # BLD: 3.11 M/UL — LOW (ref 3.8–5.2)
RBC # BLD: 3.15 M/UL — LOW (ref 3.8–5.2)
RBC # BLD: 3.15 M/UL — LOW (ref 3.8–5.2)
RBC # BLD: 3.21 M/UL — LOW (ref 3.8–5.2)
RBC # BLD: 3.29 M/UL — LOW (ref 3.8–5.2)
RBC # BLD: 3.34 M/UL — LOW (ref 3.8–5.2)
RBC # FLD: 13.4 % — SIGNIFICANT CHANGE UP (ref 10.3–14.5)
RBC # FLD: 14 % — SIGNIFICANT CHANGE UP (ref 10.3–14.5)
RBC # FLD: 15 % — HIGH (ref 10.3–14.5)
RBC # FLD: 15.1 % — HIGH (ref 10.3–14.5)
RBC # FLD: 15.1 % — HIGH (ref 10.3–14.5)
RBC # FLD: 15.3 %
RBC # FLD: 15.4 % — HIGH (ref 10.3–14.5)
RBC # FLD: 15.6 % — HIGH (ref 10.3–14.5)
RBC # FLD: 15.9 % — HIGH (ref 10.3–14.5)
RBC # FLD: 16.2 % — HIGH (ref 10.3–14.5)
RBC # FLD: 16.3 %
RBC # FLD: 16.3 % — HIGH (ref 10.3–14.5)
RBC # FLD: 17.1 % — HIGH (ref 10.3–14.5)
RBC # FLD: 17.2 % — HIGH (ref 10.3–14.5)
RBC # FLD: 17.3 % — HIGH (ref 10.3–14.5)
RBC # FLD: 17.3 % — HIGH (ref 10.3–14.5)
RBC # FLD: 17.5 % — HIGH (ref 10.3–14.5)
RBC # FLD: 17.5 % — HIGH (ref 10.3–14.5)
RBC # FLD: 17.6 % — HIGH (ref 10.3–14.5)
RBC BLD AUTO: ABNORMAL
RBC BLD AUTO: ABNORMAL
RBC CASTS # UR COMP ASSIST: ABNORMAL /HPF (ref 0–4)
RBC CASTS # UR COMP ASSIST: ABNORMAL /HPF (ref 0–4)
RBC CASTS # UR COMP ASSIST: SIGNIFICANT CHANGE UP /HPF (ref 0–4)
RBC CASTS # UR COMP ASSIST: SIGNIFICANT CHANGE UP /HPF (ref 0–4)
RETICS # AUTO: 2 %
RETICS AGGREG/RBC NFR: 57.4 K/UL
SODIUM SERPL-SCNC: 134 MMOL/L — LOW (ref 135–145)
SODIUM SERPL-SCNC: 135 MMOL/L — SIGNIFICANT CHANGE UP (ref 135–145)
SODIUM SERPL-SCNC: 135 MMOL/L — SIGNIFICANT CHANGE UP (ref 135–145)
SODIUM SERPL-SCNC: 137 MMOL/L
SODIUM SERPL-SCNC: 138 MMOL/L
SODIUM SERPL-SCNC: 138 MMOL/L
SODIUM SERPL-SCNC: 140 MMOL/L
SP GR SPEC: 1.01 — SIGNIFICANT CHANGE UP (ref 1.01–1.02)
SP GR SPEC: 1.01 — SIGNIFICANT CHANGE UP (ref 1.01–1.02)
SP GR SPEC: 1.02 — SIGNIFICANT CHANGE UP (ref 1.01–1.02)
SP GR SPEC: 1.02 — SIGNIFICANT CHANGE UP (ref 1.01–1.02)
SPECIMEN SOURCE: SIGNIFICANT CHANGE UP
TIBC SERPL-MCNC: 227 UG/DL — SIGNIFICANT CHANGE UP (ref 220–430)
TIBC SERPL-MCNC: 227 UG/DL — SIGNIFICANT CHANGE UP (ref 220–430)
TIBC SERPL-MCNC: 229 UG/DL
TIBC SERPL-MCNC: 234 UG/DL
TIBC SERPL-MCNC: 236 UG/DL
TRANSFERRIN SERPL-MCNC: 159 MG/DL — LOW (ref 192–382)
TRANSFERRIN SERPL-MCNC: 159 MG/DL — LOW (ref 192–382)
TRIGL SERPL-MCNC: 86 MG/DL
TRIGL SERPL-MCNC: 88 MG/DL
UIBC SERPL-MCNC: 191 UG/DL
UIBC SERPL-MCNC: 218 UG/DL
UIBC SERPL-MCNC: 220 UG/DL
UROBILINOGEN FLD QL: NEGATIVE MG/DL — SIGNIFICANT CHANGE UP
UROBILINOGEN FLD QL: NEGATIVE MG/DL — SIGNIFICANT CHANGE UP
UROBILINOGEN FLD QL: NEGATIVE — SIGNIFICANT CHANGE UP
UROBILINOGEN FLD QL: NEGATIVE — SIGNIFICANT CHANGE UP
VIT B12 SERPL-MCNC: 611 PG/ML
VIT B12 SERPL-MCNC: 692 PG/ML
WBC # BLD: 3.8 K/UL — SIGNIFICANT CHANGE UP (ref 3.8–10.5)
WBC # BLD: 3.9 K/UL — SIGNIFICANT CHANGE UP (ref 3.8–10.5)
WBC # BLD: 4.1 K/UL — SIGNIFICANT CHANGE UP (ref 3.8–10.5)
WBC # BLD: 4.2 K/UL — SIGNIFICANT CHANGE UP (ref 3.8–10.5)
WBC # BLD: 4.3 K/UL — SIGNIFICANT CHANGE UP (ref 3.8–10.5)
WBC # BLD: 4.3 K/UL — SIGNIFICANT CHANGE UP (ref 3.8–10.5)
WBC # BLD: 4.4 K/UL — SIGNIFICANT CHANGE UP (ref 3.8–10.5)
WBC # BLD: 4.5 K/UL — SIGNIFICANT CHANGE UP (ref 3.8–10.5)
WBC # BLD: 4.7 K/UL — SIGNIFICANT CHANGE UP (ref 3.8–10.5)
WBC # BLD: 4.8 K/UL — SIGNIFICANT CHANGE UP (ref 3.8–10.5)
WBC # BLD: 5.5 K/UL — SIGNIFICANT CHANGE UP (ref 3.8–10.5)
WBC # BLD: 6.04 K/UL — SIGNIFICANT CHANGE UP (ref 3.8–10.5)
WBC # BLD: 6.04 K/UL — SIGNIFICANT CHANGE UP (ref 3.8–10.5)
WBC # BLD: 6.31 K/UL — SIGNIFICANT CHANGE UP (ref 3.8–10.5)
WBC # BLD: 6.31 K/UL — SIGNIFICANT CHANGE UP (ref 3.8–10.5)
WBC # BLD: 8.22 K/UL — SIGNIFICANT CHANGE UP (ref 3.8–10.5)
WBC # BLD: 8.22 K/UL — SIGNIFICANT CHANGE UP (ref 3.8–10.5)
WBC # FLD AUTO: 3.8 K/UL — SIGNIFICANT CHANGE UP (ref 3.8–10.5)
WBC # FLD AUTO: 3.9 K/UL — SIGNIFICANT CHANGE UP (ref 3.8–10.5)
WBC # FLD AUTO: 3.91 K/UL
WBC # FLD AUTO: 4.1 K/UL — SIGNIFICANT CHANGE UP (ref 3.8–10.5)
WBC # FLD AUTO: 4.2 K/UL — SIGNIFICANT CHANGE UP (ref 3.8–10.5)
WBC # FLD AUTO: 4.3 K/UL — SIGNIFICANT CHANGE UP (ref 3.8–10.5)
WBC # FLD AUTO: 4.3 K/UL — SIGNIFICANT CHANGE UP (ref 3.8–10.5)
WBC # FLD AUTO: 4.4 K/UL — SIGNIFICANT CHANGE UP (ref 3.8–10.5)
WBC # FLD AUTO: 4.5 K/UL — SIGNIFICANT CHANGE UP (ref 3.8–10.5)
WBC # FLD AUTO: 4.65 K/UL
WBC # FLD AUTO: 4.7 K/UL — SIGNIFICANT CHANGE UP (ref 3.8–10.5)
WBC # FLD AUTO: 4.8 K/UL — SIGNIFICANT CHANGE UP (ref 3.8–10.5)
WBC # FLD AUTO: 5.5 K/UL — SIGNIFICANT CHANGE UP (ref 3.8–10.5)
WBC # FLD AUTO: 6.04 K/UL — SIGNIFICANT CHANGE UP (ref 3.8–10.5)
WBC # FLD AUTO: 6.04 K/UL — SIGNIFICANT CHANGE UP (ref 3.8–10.5)
WBC # FLD AUTO: 6.31 K/UL — SIGNIFICANT CHANGE UP (ref 3.8–10.5)
WBC # FLD AUTO: 6.31 K/UL — SIGNIFICANT CHANGE UP (ref 3.8–10.5)
WBC # FLD AUTO: 8.22 K/UL — SIGNIFICANT CHANGE UP (ref 3.8–10.5)
WBC # FLD AUTO: 8.22 K/UL — SIGNIFICANT CHANGE UP (ref 3.8–10.5)
WBC UR QL: SIGNIFICANT CHANGE UP /HPF (ref 0–5)

## 2023-01-01 PROCEDURE — 36415 COLL VENOUS BLD VENIPUNCTURE: CPT

## 2023-01-01 PROCEDURE — 83735 ASSAY OF MAGNESIUM: CPT

## 2023-01-01 PROCEDURE — 99213 OFFICE O/P EST LOW 20 MIN: CPT

## 2023-01-01 PROCEDURE — 82728 ASSAY OF FERRITIN: CPT

## 2023-01-01 PROCEDURE — 93005 ELECTROCARDIOGRAM TRACING: CPT

## 2023-01-01 PROCEDURE — 70450 CT HEAD/BRAIN W/O DYE: CPT | Mod: MG

## 2023-01-01 PROCEDURE — 71250 CT THORAX DX C-: CPT

## 2023-01-01 PROCEDURE — 81001 URINALYSIS AUTO W/SCOPE: CPT

## 2023-01-01 PROCEDURE — 80053 COMPREHEN METABOLIC PANEL: CPT

## 2023-01-01 PROCEDURE — 86880 COOMBS TEST DIRECT: CPT

## 2023-01-01 PROCEDURE — 86077 PHYS BLOOD BANK SERV XMATCH: CPT

## 2023-01-01 PROCEDURE — 86901 BLOOD TYPING SEROLOGIC RH(D): CPT

## 2023-01-01 PROCEDURE — 71250 CT THORAX DX C-: CPT | Mod: MA

## 2023-01-01 PROCEDURE — 85610 PROTHROMBIN TIME: CPT

## 2023-01-01 PROCEDURE — G1004: CPT

## 2023-01-01 PROCEDURE — 85025 COMPLETE CBC W/AUTO DIFF WBC: CPT

## 2023-01-01 PROCEDURE — 86870 RBC ANTIBODY IDENTIFICATION: CPT

## 2023-01-01 PROCEDURE — 99406 BEHAV CHNG SMOKING 3-10 MIN: CPT

## 2023-01-01 PROCEDURE — P9040: CPT

## 2023-01-01 PROCEDURE — G0439: CPT

## 2023-01-01 PROCEDURE — 85730 THROMBOPLASTIN TIME PARTIAL: CPT

## 2023-01-01 PROCEDURE — 86850 RBC ANTIBODY SCREEN: CPT

## 2023-01-01 PROCEDURE — L9992: CPT

## 2023-01-01 PROCEDURE — 99239 HOSP IP/OBS DSCHRG MGMT >30: CPT

## 2023-01-01 PROCEDURE — 99222 1ST HOSP IP/OBS MODERATE 55: CPT

## 2023-01-01 PROCEDURE — 83880 ASSAY OF NATRIURETIC PEPTIDE: CPT

## 2023-01-01 PROCEDURE — 96374 THER/PROPH/DIAG INJ IV PUSH: CPT | Mod: XU

## 2023-01-01 PROCEDURE — 99281 EMR DPT VST MAYX REQ PHY/QHP: CPT | Mod: 25

## 2023-01-01 PROCEDURE — 86900 BLOOD TYPING SEROLOGIC ABO: CPT

## 2023-01-01 PROCEDURE — 83540 ASSAY OF IRON: CPT

## 2023-01-01 PROCEDURE — 85018 HEMOGLOBIN: CPT

## 2023-01-01 PROCEDURE — 86860 RBC ANTIBODY ELUTION: CPT

## 2023-01-01 PROCEDURE — 99285 EMERGENCY DEPT VISIT HI MDM: CPT

## 2023-01-01 PROCEDURE — G0008: CPT

## 2023-01-01 PROCEDURE — 99214 OFFICE O/P EST MOD 30 MIN: CPT

## 2023-01-01 PROCEDURE — 85014 HEMATOCRIT: CPT

## 2023-01-01 PROCEDURE — 84100 ASSAY OF PHOSPHORUS: CPT

## 2023-01-01 PROCEDURE — 99203 OFFICE O/P NEW LOW 30 MIN: CPT

## 2023-01-01 PROCEDURE — 83690 ASSAY OF LIPASE: CPT

## 2023-01-01 PROCEDURE — 87305 ASPERGILLUS AG IA: CPT

## 2023-01-01 PROCEDURE — 87449 NOS EACH ORGANISM AG IA: CPT

## 2023-01-01 PROCEDURE — 71250 CT THORAX DX C-: CPT | Mod: 26,MA

## 2023-01-01 PROCEDURE — 82550 ASSAY OF CK (CPK): CPT

## 2023-01-01 PROCEDURE — 96375 TX/PRO/DX INJ NEW DRUG ADDON: CPT

## 2023-01-01 PROCEDURE — 85027 COMPLETE CBC AUTOMATED: CPT

## 2023-01-01 PROCEDURE — 74176 CT ABD & PELVIS W/O CONTRAST: CPT | Mod: 26,MA

## 2023-01-01 PROCEDURE — 71045 X-RAY EXAM CHEST 1 VIEW: CPT

## 2023-01-01 PROCEDURE — 72125 CT NECK SPINE W/O DYE: CPT | Mod: MG

## 2023-01-01 PROCEDURE — 99441: CPT | Mod: 95

## 2023-01-01 PROCEDURE — 82785 ASSAY OF IGE: CPT

## 2023-01-01 PROCEDURE — 99212 OFFICE O/P EST SF 10 MIN: CPT

## 2023-01-01 PROCEDURE — 99348 HOME/RES VST EST LOW MDM 30: CPT | Mod: 25

## 2023-01-01 PROCEDURE — 99223 1ST HOSP IP/OBS HIGH 75: CPT

## 2023-01-01 PROCEDURE — 84484 ASSAY OF TROPONIN QUANT: CPT

## 2023-01-01 PROCEDURE — 74176 CT ABD & PELVIS W/O CONTRAST: CPT | Mod: MA

## 2023-01-01 PROCEDURE — 80048 BASIC METABOLIC PNL TOTAL CA: CPT

## 2023-01-01 PROCEDURE — 84466 ASSAY OF TRANSFERRIN: CPT

## 2023-01-01 PROCEDURE — 71045 X-RAY EXAM CHEST 1 VIEW: CPT | Mod: 26

## 2023-01-01 PROCEDURE — 99214 OFFICE O/P EST MOD 30 MIN: CPT | Mod: 25

## 2023-01-01 PROCEDURE — 82553 CREATINE MB FRACTION: CPT

## 2023-01-01 PROCEDURE — C8929: CPT

## 2023-01-01 PROCEDURE — 87040 BLOOD CULTURE FOR BACTERIA: CPT

## 2023-01-01 PROCEDURE — 86606 ASPERGILLUS ANTIBODY: CPT

## 2023-01-01 PROCEDURE — 83550 IRON BINDING TEST: CPT

## 2023-01-01 PROCEDURE — 93010 ELECTROCARDIOGRAM REPORT: CPT

## 2023-01-01 PROCEDURE — 90662 IIV NO PRSV INCREASED AG IM: CPT

## 2023-01-01 PROCEDURE — 87086 URINE CULTURE/COLONY COUNT: CPT

## 2023-01-01 PROCEDURE — 86922 COMPATIBILITY TEST ANTIGLOB: CPT

## 2023-01-01 PROCEDURE — 87103 BLOOD FUNGUS CULTURE: CPT

## 2023-01-01 RX ORDER — ATORVASTATIN CALCIUM 10 MG/1
10 TABLET, FILM COATED ORAL
Qty: 1 | Refills: 0 | Status: ACTIVE | COMMUNITY
Start: 2021-10-26

## 2023-01-01 RX ORDER — CHLORHEXIDINE GLUCONATE 4 %
325 (65 FE) LIQUID (ML) TOPICAL DAILY
Qty: 90 | Refills: 3 | Status: ACTIVE | COMMUNITY
Start: 2023-01-01 | End: 1900-01-01

## 2023-01-01 RX ORDER — ATORVASTATIN CALCIUM 80 MG/1
40 TABLET, FILM COATED ORAL AT BEDTIME
Refills: 0 | Status: DISCONTINUED | OUTPATIENT
Start: 2023-01-01 | End: 2023-01-01

## 2023-01-01 RX ORDER — LANOLIN ALCOHOL/MO/W.PET/CERES
3 CREAM (GRAM) TOPICAL AT BEDTIME
Refills: 0 | Status: DISCONTINUED | OUTPATIENT
Start: 2023-01-01 | End: 2023-01-01

## 2023-01-01 RX ORDER — DOXYCYCLINE 100 MG/1
100 CAPSULE ORAL TWICE DAILY
Qty: 14 | Refills: 0 | Status: DISCONTINUED | COMMUNITY
Start: 2023-01-01 | End: 2023-01-01

## 2023-01-01 RX ORDER — IOHEXOL 300 MG/ML
30 INJECTION, SOLUTION INTRAVENOUS ONCE
Refills: 0 | Status: DISCONTINUED | OUTPATIENT
Start: 2023-01-01 | End: 2023-01-01

## 2023-01-01 RX ORDER — LIDOCAINE 4 G/100G
1 CREAM TOPICAL
Qty: 30 | Refills: 0
Start: 2023-01-01 | End: 2023-11-16

## 2023-01-01 RX ORDER — VORICONAZOLE 10 MG/ML
240 INJECTION, POWDER, LYOPHILIZED, FOR SOLUTION INTRAVENOUS
Refills: 0 | Status: DISCONTINUED | OUTPATIENT
Start: 2023-01-01 | End: 2023-01-01

## 2023-01-01 RX ORDER — ACETAMINOPHEN 500 MG
650 TABLET ORAL EVERY 6 HOURS
Refills: 0 | Status: DISCONTINUED | OUTPATIENT
Start: 2023-01-01 | End: 2023-01-01

## 2023-01-01 RX ORDER — ATENOLOL 25 MG/1
25 TABLET ORAL DAILY
Refills: 0 | Status: DISCONTINUED | OUTPATIENT
Start: 2023-01-01 | End: 2023-01-01

## 2023-01-01 RX ORDER — ONDANSETRON 8 MG/1
4 TABLET, FILM COATED ORAL EVERY 8 HOURS
Refills: 0 | Status: DISCONTINUED | OUTPATIENT
Start: 2023-01-01 | End: 2023-01-01

## 2023-01-01 RX ORDER — PANTOPRAZOLE SODIUM 20 MG/1
40 TABLET, DELAYED RELEASE ORAL
Refills: 0 | Status: DISCONTINUED | OUTPATIENT
Start: 2023-01-01 | End: 2023-01-01

## 2023-01-01 RX ORDER — FEXOFENADINE HYDROCHLORIDE 180 MG/1
180 TABLET, FILM COATED ORAL
Refills: 0 | Status: DISCONTINUED | COMMUNITY
Start: 2018-04-25 | End: 2023-01-01

## 2023-01-01 RX ORDER — TRAMADOL HYDROCHLORIDE 50 MG/1
50 TABLET, COATED ORAL
Qty: 60 | Refills: 0 | Status: ACTIVE | COMMUNITY
Start: 2023-01-01 | End: 1900-01-01

## 2023-01-01 RX ORDER — PANTOPRAZOLE SODIUM 20 MG/1
40 TABLET, DELAYED RELEASE ORAL ONCE
Refills: 0 | Status: COMPLETED | OUTPATIENT
Start: 2023-01-01 | End: 2023-01-01

## 2023-01-01 RX ORDER — ZOSTER VACCINE RECOMBINANT, ADJUVANTED 50 MCG/0.5
50 KIT INTRAMUSCULAR
Qty: 1 | Refills: 1 | Status: DISCONTINUED | COMMUNITY
Start: 2021-09-28 | End: 2023-01-01

## 2023-01-01 RX ORDER — VORICONAZOLE 10 MG/ML
225 INJECTION, POWDER, LYOPHILIZED, FOR SOLUTION INTRAVENOUS ONCE
Refills: 0 | Status: COMPLETED | OUTPATIENT
Start: 2023-01-01 | End: 2023-01-01

## 2023-01-01 RX ORDER — SODIUM CHLORIDE 9 MG/ML
3 INJECTION INTRAMUSCULAR; INTRAVENOUS; SUBCUTANEOUS EVERY 8 HOURS
Refills: 0 | Status: DISCONTINUED | OUTPATIENT
Start: 2023-01-01 | End: 2023-01-01

## 2023-01-01 RX ORDER — ONDANSETRON 8 MG/1
4 TABLET, FILM COATED ORAL ONCE
Refills: 0 | Status: COMPLETED | OUTPATIENT
Start: 2023-01-01 | End: 2023-01-01

## 2023-01-01 RX ORDER — LIDOCAINE 4 G/100G
1 CREAM TOPICAL DAILY
Refills: 0 | Status: DISCONTINUED | OUTPATIENT
Start: 2023-01-01 | End: 2023-01-01

## 2023-01-01 RX ORDER — FLECAINIDE ACETATE 50 MG
50 TABLET ORAL
Refills: 0 | Status: DISCONTINUED | OUTPATIENT
Start: 2023-01-01 | End: 2023-01-01

## 2023-01-01 RX ORDER — ATORVASTATIN CALCIUM 10 MG/1
10 TABLET, FILM COATED ORAL
Qty: 30 | Refills: 0 | Status: DISCONTINUED | COMMUNITY
Start: 2022-09-02 | End: 2023-01-01

## 2023-01-01 RX ORDER — IRON SUCROSE 20 MG/ML
200 INJECTION, SOLUTION INTRAVENOUS EVERY 24 HOURS
Refills: 0 | Status: DISCONTINUED | OUTPATIENT
Start: 2023-01-01 | End: 2023-01-01

## 2023-01-01 RX ORDER — ALPRAZOLAM 0.25 MG/1
0.25 TABLET ORAL
Qty: 30 | Refills: 0 | Status: DISCONTINUED | COMMUNITY
Start: 2018-04-25 | End: 2023-01-01

## 2023-01-01 RX ADMIN — ATORVASTATIN CALCIUM 40 MILLIGRAM(S): 80 TABLET, FILM COATED ORAL at 21:04

## 2023-01-01 RX ADMIN — Medication 50 MILLIGRAM(S): at 05:19

## 2023-01-01 RX ADMIN — SODIUM CHLORIDE 3 MILLILITER(S): 9 INJECTION INTRAMUSCULAR; INTRAVENOUS; SUBCUTANEOUS at 05:10

## 2023-01-01 RX ADMIN — PANTOPRAZOLE SODIUM 40 MILLIGRAM(S): 20 TABLET, DELAYED RELEASE ORAL at 06:06

## 2023-01-01 RX ADMIN — VORICONAZOLE 125 MILLIGRAM(S): 10 INJECTION, POWDER, LYOPHILIZED, FOR SOLUTION INTRAVENOUS at 03:40

## 2023-01-01 RX ADMIN — ATENOLOL 25 MILLIGRAM(S): 25 TABLET ORAL at 05:19

## 2023-01-01 RX ADMIN — Medication 50 MILLIGRAM(S): at 18:18

## 2023-01-01 RX ADMIN — SODIUM CHLORIDE 3 MILLILITER(S): 9 INJECTION INTRAMUSCULAR; INTRAVENOUS; SUBCUTANEOUS at 21:03

## 2023-01-01 RX ADMIN — PANTOPRAZOLE SODIUM 40 MILLIGRAM(S): 20 TABLET, DELAYED RELEASE ORAL at 05:19

## 2023-01-01 RX ADMIN — SODIUM CHLORIDE 3 MILLILITER(S): 9 INJECTION INTRAMUSCULAR; INTRAVENOUS; SUBCUTANEOUS at 07:20

## 2023-01-01 RX ADMIN — Medication 50 MILLIGRAM(S): at 06:40

## 2023-01-01 RX ADMIN — LIDOCAINE 1 PATCH: 4 CREAM TOPICAL at 19:53

## 2023-01-01 RX ADMIN — SODIUM CHLORIDE 3 MILLILITER(S): 9 INJECTION INTRAMUSCULAR; INTRAVENOUS; SUBCUTANEOUS at 14:08

## 2023-01-01 RX ADMIN — LIDOCAINE 1 PATCH: 4 CREAM TOPICAL at 05:26

## 2023-01-01 RX ADMIN — PANTOPRAZOLE SODIUM 40 MILLIGRAM(S): 20 TABLET, DELAYED RELEASE ORAL at 21:50

## 2023-01-01 RX ADMIN — ONDANSETRON 4 MILLIGRAM(S): 8 TABLET, FILM COATED ORAL at 18:54

## 2023-01-01 RX ADMIN — ATENOLOL 25 MILLIGRAM(S): 25 TABLET ORAL at 06:06

## 2023-01-01 RX ADMIN — LIDOCAINE 1 PATCH: 4 CREAM TOPICAL at 16:22

## 2023-03-06 NOTE — ASSESSMENT
[FreeTextEntry1] : 83-year-old female with extensive medical history including breast and lung cancer with most recent imaging for both showing no evidence of disease.  Mammography was September 2022 and CAT scan of the chest July 2022\par \par Patient with recent episodes of hemoptysis therefore again recommend CAT scan which patient agrees to do and referral given.\par \par PAF with patient clinically in sinus rhythm therefore continue present treatment.\par Chest pain evaluated by cardiology with cardiac PET scan scheduled.\par \par Long-term cigarette smoking with patient having no desire to quit.\par \par \par Hyperlipidemia on atorvastatin with patient complaining of hair loss therefore will discuss with cardiology.\par \par Up-to-date with vaccines including COVID and influenza\par \par Venipuncture done today in the office\par \par Follow-up in 6 months

## 2023-03-06 NOTE — PHYSICAL EXAM
[No Acute Distress] : no acute distress [No Respiratory Distress] : no respiratory distress  [Clear to Auscultation] : lungs were clear to auscultation bilaterally [Normal Rate] : normal rate  [Regular Rhythm] : with a regular rhythm [Normal Affect] : the affect was normal [Normal Mood] : the mood was normal [Normal Sclera/Conjunctiva] : normal sclera/conjunctiva [No JVD] : no jugular venous distention [Soft] : abdomen soft [Non Tender] : non-tender [No Focal Deficits] : no focal deficits [de-identified] : Decreased breath sounds right base

## 2023-03-06 NOTE — HISTORY OF PRESENT ILLNESS
[FreeTextEntry1] : Follow-up PAF, hyperlipidemia, history of lung and breast cancer [de-identified] : 83-year-old female presents for her 6-month follow-up with history of arrhythmia with includes atrial fibrillation remains in normal sinus rhythm on flecainide and atenolol.Patient has loop recorder.\par She remains on aspirin 81 mg daily.\par \par The patient has hyperlipidemia and had been on Zocor but requested and stopped it about 14 months ago secondary to hair loss.She feels her hair loss has improved.\par She is now on atorvastatin 3 times a week.  She now states this is causing hair loss.\par \par Hyperparathyroidism with calcium being monitored\par \par  Patient's past history includes lung cancer and breast cancer with testing July 2019 showing no evidence of disease. Patient continues to smoke cigarettes 1-1-1/2 packs per day and declines any suggestion of decreasing or quitting.\par She had an acute telephonic visit February 16, 2023 secondary to 1 day of coughing up bloody mucus.  She was treated with doxycycline with CAT scan recommended but she declined.  Symptoms resolved without return until 2 days ago again with bloody mucus and now none.  No chest pain or shortness of breath.\par \par She was having some atypical chest pain for which she has seen cardiology with good echocardiogram and cardiac PET scan scheduled.  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  Neurology evaluation was recommended but declines and feels her tremor is about the same.\par \par Patient has had an FUO for about 7 years with no specific etiology the possibilities including chronic sinusitis and colovaginal fistula for which she has seen GYN, general surgery and infectious disease.\par The patient states she still gets low grade fevers about twice a week at 99.2 and she takes Tylenol. \par \par The patient's  passed away about 18 months ago and currently living alone which she is okay with.\par

## 2023-04-18 NOTE — REVIEW OF SYSTEMS
[Patient Intake Form Reviewed] : Patient intake form was reviewed [Fatigue] : fatigue [SOB on Exertion] : shortness of breath during exertion [Negative] : Heme/Lymph [FreeTextEntry2] : negative except as reviewed in interval history  Consent (Ear)/Introductory Paragraph: The rationale for Mohs was explained to the patient and consent was obtained. The risks, benefits and alternatives to therapy were discussed in detail. Specifically, the risks of ear deformity, infection, scarring, bleeding, prolonged wound healing, incomplete removal, allergy to anesthesia, nerve injury and recurrence were addressed. Prior to the procedure, the treatment site was clearly identified and confirmed by the patient. All components of Universal Protocol/PAUSE Rule completed.

## 2023-04-18 NOTE — ASSESSMENT
[FreeTextEntry1] : 83 year old F here for the evaluation of symptomatic anemia of chronic disease. \par  \par Reviewed: \par 3/7/2023  Fe 18 , TIBC  236, Sat 7%, B12 611 , Folate 12 , Ferritin 51, Ca+ 11.0 , Cr 1.09, AST 12, ALT 9, ALK Phos 98, eGFR 50\par 04/17/2023 Today's CBC: WBC 4.5 K, HGB 8.2 g, HCT 26.0 %,  K, ANC 2500\par \par Plan \par Will proceed with 1 unit of PRBC\par Continue supportive transfusion PRN to maintain Hb > 10 g \par D/w pt no role for bmbx  \par RTC monthly, with PA f/u and CBC \par

## 2023-04-18 NOTE — ADDENDUM
[FreeTextEntry1] : Documented by Divine Logan  acting as scribe for Dr. Loja on  04/17/2023.\par \par All Medical record entries made by the Scribe were at my, Dr. Loja's, direction and personally dictated by me on  04/17/2023. I have reviewed the chart and agree that the record accurately reflects my personal performance of the history, physical exam, assessment and plan. I have also personally directed, reviewed, and agreed with the discharge instructions.

## 2023-04-18 NOTE — HISTORY OF PRESENT ILLNESS
[de-identified] : BRANNON GRANT is a 83 year old F here for the evaluation of symptomatic anemia of chronic disease. \par \par \par Reports significant fatigue, takes 3 naps per day, slight SOB\par Reports last transfusion led ED visit\par Reports h/o spinal tap w/o local anesthesia with significant pain during the procedure  \par Reports coughing up blood, blood appears brown\par \par PMH:  lung cancer and breast cancer with testing July 2019 showing no evidence of disease,  lung cancer and breast cancer with testing July 2019 showing no evidence of disease, Afib, COPD\par Socx: Active smoker- 1 - 1.5 ppd, lives alone- daughter lives nearby, \par \par Care Team: \par PCP, Chas Dsouza\par \par

## 2023-04-18 NOTE — PHYSICAL EXAM
[Thin] : thin [Normal] : clear to auscultation bilaterally, no dullness, no wheezing [de-identified] : Elderly, frail, moves slowly

## 2023-05-09 NOTE — HISTORY OF PRESENT ILLNESS
[de-identified] : \par BRANNON GRANT is a 83 year old F followed for symptomatic anemia of chronic disease. \par \par \par Reports significant fatigue, takes 3 naps per day, slight SOB\par Reports last transfusion led ED visit\par Reports h/o spinal tap w/o local anesthesia with significant pain during the procedure \par Reports coughing up blood, blood appears brown\par \par PMH: lung cancer and breast cancer with testing July 2019 showing no evidence of disease, lung cancer and breast cancer with testing July 2019 showing no evidence of disease, Afib, COPD\par Socx: Active smoker- 1 - 1.5 ppd, lives alone- daughter lives nearby, \par \par  [de-identified] : Recent discharge from [facility]:\par \par Hospital Course: \par Discharge Date	26-Apr-2023 \par Admission Date	25-Apr-2023 01:09 \par Hospital Course	 \par The patient is an 83 year old female with a past medical history of afib (not \par on AC), HTN, HLD, lung CA and breast CA, anemia of chronic disease was brought \par to the ED s/p fall. Patient said she stepped outside to get paper and fell, \par neighbor helped her. Later she fell again in the house on her right side, \par hitting her head and face. She pressed her life alert button, EMS came and \par brought her to the ED. Of note, patient said she had a blood transfusion 4 days \par ago and she has not been feeling too well since. CT head/cervical: no acute \par intracranial finding. Troponin: 0.50/ Seen by cardiology, TTe with normal LVSF> \par \par Recommended outpatient PET scan. Seen by PT, recommend \par \par \par \par Med Reconciliation: \par Medication Reconciliation Status	Admission Reconciliation is Completed \par Discharge Reconciliation is Completed \par Discharge Medications	acetaminophen 325 mg oral tablet: 2 tab(s) orally every 6 \par hours As needed Temp greater or equal to 38C (100.4F), Mild Pain (1 - 3) \par aspirin 81 mg oral tablet: orally once a day \par atenolol 25 mg oral tablet: 1 tab(s) orally once a day \par atorvastatin 10 mg oral tablet: 1 orally once a day (at bedtime) \par cholecalciferol oral tablet: 1000 unit(s) orally once a day \par flecainide 50 mg oral tablet: 1 tab(s) orally every 12 hours \par , \par Would prefer to hold off on transfusion at this time - today's hgb. 8.6, hct. 26.8.

## 2023-05-09 NOTE — PHYSICAL EXAM
[Thin] : thin [Normal] : clear to auscultation bilaterally, no dullness, no wheezing [de-identified] : Elderly, frail, moves slowly

## 2023-05-09 NOTE — ASSESSMENT
[FreeTextEntry1] : \par 83 year old F followed for symptomatic anemia of chronic disease. \par  \par \par \par Continue supportive transfusion PRN \par RTC monthly, with PA f/u and CBC \par . \par

## 2023-05-17 NOTE — PHYSICAL EXAM
[Restricted in physically strenuous activity but ambulatory and able to carry out work of a light or sedentary nature] : Status 1- Restricted in physically strenuous activity but ambulatory and able to carry out work of a light or sedentary nature, e.g., light house work, office work [Thin] : thin [Normal] : affect appropriate [de-identified] : frail

## 2023-05-17 NOTE — REASON FOR VISIT
[Follow-Up Visit] : a follow-up visit for [Formal Caregiver] : formal caregiver [FreeTextEntry2] : anemia

## 2023-05-17 NOTE — HISTORY OF PRESENT ILLNESS
[de-identified] : BRANNON GRANT is a 83 year old F followed for symptomatic anemia of chronic disease. \par  Has required transfusion. [de-identified] : Was just seen by Dr. Loja last week, HGB was 8.6. I believe here for CBC check, still fatigued but slightly better, was started on oral iron.\par Still smoking and no desire to stop.

## 2023-05-17 NOTE — ASSESSMENT
[FreeTextEntry1] : BRANNON GRANT is a 83 year old F followed for symptomatic anemia of chronic disease. \par  Has required transfusion. Last transfusion at time of hospitalization in early May. She did not feel well after the transfusion.Feeling better today. She was started on oral iron one week ago. Her HGb has improved slightly, now 9.3, will keep her on oral iron and monitor her CBC  in 2 weeks, with F/u in 1 month..

## 2023-05-22 PROBLEM — Z09 HOSPITAL DISCHARGE FOLLOW-UP: Status: ACTIVE | Noted: 2023-01-01
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
## 2023-05-22 PROBLEM — Z87.898 HISTORY OF HEMOPTYSIS: Status: RESOLVED | Noted: 2022-07-12 | Resolved: 2023-01-01

## 2023-05-22 NOTE — ASSESSMENT
[FreeTextEntry1] : Status post hospitalization secondary to weakness with fall with head trauma without any intracranial issues.\par Patient with symptomatic severe anemia secondary to anemia of chronic disease status posttransfusion and now on p.o. iron with improved hemoglobin with last on May 15 at 9.3.\par Patient to continue present treatment including p.o. iron.\par Has follow-up with hematology scheduled.\par Follow-up here as scheduled October for her yearly physical and as needed.

## 2023-05-22 NOTE — HISTORY OF PRESENT ILLNESS
[Post-hospitalization from ___ Hospital] : Post-hospitalization from [unfilled] Hospital [Discharged on ___] : discharged on [unfilled] [Discharge Summary] : discharge summary [Pertinent Labs] : pertinent labs [Radiology Findings] : radiology findings [Discharge Med List] : discharge medication list [Med Reconciliation] : medication reconciliation has been completed [Patient Contacted By: ____] : and contacted by [unfilled] [Admitted on: ___] : The patient was admitted on [unfilled] [FreeTextEntry2] : The patient presents after being admitted to [facility] with history of A-fib not on anticoagulation, hypertension, lung and breast cancer, anemia of chronic disease recently worsening presented to the ER on April 24, 2023 after a fall at home. Neighbor helped her up but she later fell again onto her right side and hitting her head and face.\par Brought to the ER via EMS.\par She had recently seen hematology as an outpatient for worsening anemia and received a blood transfusion 4 days prior.\par CT scan of the head and cervical spine without acute changes with multilevel cervical spondylosis.\par CT scan of the chest showed extensive scarring in the right lung with shift of the heart and mediastinum as well as trachea to the right with bronchiectasis in the right upper lobe. Superimposed infiltrate right lower lobe. Emphysema.\par Slightly increased troponins and seen by cardiology with echocardiogram showing normal LVEF with mild AR/MR/TR.\par Physical therapy consult gotten\par Received transfusion with improved hemoglobin\par Patient felt stable for discharge home on April 26, 2023. \par \par Since discharge the patient has followed up with hematology having patient on oral iron.\par Most recent hemoglobin May 15, 2023 = 9.3.\par With this improved hemoglobin the patient feels better with less fatigue, less need to take a nap and better energy.  Still low weight with low calories.\par Continues to smoke cigarettes despite multiple recommendations to stop.

## 2023-06-05 NOTE — PHYSICAL EXAM
[Thin] : thin [Normal] : clear to auscultation bilaterally, no dullness, no wheezing [de-identified] : Elderly, frail, moves slowly

## 2023-06-05 NOTE — ASSESSMENT
[FreeTextEntry1] : \par 83 year old F followed for symptomatic anemia of chronic disease. \par  \par \par -Continue supportive transfusion PRN \par -RTC monthly, with PA f/u and CBC \par . \par

## 2023-06-05 NOTE — HISTORY OF PRESENT ILLNESS
[de-identified] : \par BRANNON GRANT is a 83 year old F followed for symptomatic anemia of chronic disease. \par \par \par Reports significant fatigue, takes 3 naps per day, slight SOB\par Reports last transfusion led ED visit\par Reports h/o spinal tap w/o local anesthesia with significant pain during the procedure \par Reports coughing up blood, blood appears brown\par \par PMH: lung cancer and breast cancer with testing July 2019 showing no evidence of disease, lung cancer and breast cancer with testing July 2019 showing no evidence of disease, Afib, COPD\par Socx: Active smoker- 1 - 1.5 ppd, lives alone- daughter lives nearby, \par \par  [de-identified] : Patient presents for follow up:\par \par \par Recent discharge [facility]par Hospital Course:  4/25/23-4/26/23\par The patient  was brought  to the ED s/p fall. Patient said she stepped outside to get paper and fell, neighbor helped her. Later she fell again in the house on her right side, \par hitting her head and face. She pressed her life alert button, EMS came and  brought her to the ED. Of note, patient said she had a blood transfusion 4 days \par ago and she has not been feeling too well since. CT head/cervical: no acute  intracranial finding. Troponin: 0.50/ Seen by cardiology, TTe with normal LVSF> \par \par \par \par \par \par Med Reconciliation: \par Medication Reconciliation Status	Admission Reconciliation is Completed \par Discharge Reconciliation is Completed \par Discharge Medications	acetaminophen 325 mg oral tablet: 2 tab(s) orally every 6 \par hours As needed Temp greater or equal to 38C (100.4F), Mild Pain (1 - 3) \par aspirin 81 mg oral tablet: orally once a day \par atenolol 25 mg oral tablet: 1 tab(s) orally once a day \par atorvastatin 10 mg oral tablet: 1 orally once a day (at bedtime) \par cholecalciferol oral tablet: 1000 unit(s) orally once a day \par flecainide 50 mg oral tablet: 1 tab(s) orally every 12 hours \par

## 2023-06-21 NOTE — PHYSICAL EXAM
[Restricted in physically strenuous activity but ambulatory and able to carry out work of a light or sedentary nature] : Status 1- Restricted in physically strenuous activity but ambulatory and able to carry out work of a light or sedentary nature, e.g., light house work, office work [Thin] : thin [Normal] : affect appropriate [de-identified] : frail

## 2023-06-21 NOTE — ASSESSMENT
[FreeTextEntry1] : BRANNON GRANT is a 83 year old F followed for symptomatic anemia of chronic disease. \par  \par -Patient s/p 2 U PRBC in April 2023,  Last transfusion at time of hospitalization in early May\par -She was started on oral iron one tab daily in May. Continue oral iron \par - Monitor her CBC weekly, transfuse as necessary Hg <8\par - May consider Procrit in the future given decrease in GFR\par - F/u in 1 month..

## 2023-06-21 NOTE — HISTORY OF PRESENT ILLNESS
[de-identified] : BRANNON GRANT is a 83 year old F followed for symptomatic anemia of chronic disease. \par  Has required transfusion. [de-identified] : Patient here for follow up accompanied with health aide\par Patient s/p 2 U PRBC in April 2023,  Last transfusion at time of hospitalization in early May\par \par Patient admits fatigue \par Still smoking and no desire to stop.\par \par Today CBC WBC 4.3, Hg 8.6, HCT 26.2, Plt 281

## 2023-06-28 PROBLEM — R05.3 CHRONIC COUGH: Status: ACTIVE | Noted: 2017-06-19

## 2023-06-28 NOTE — HISTORY OF PRESENT ILLNESS
[FreeTextEntry8] : The patient presents for an acute visit secondary to calling and wanting to be seen secondary to a cough.\par On direct questioning the patient states that she has had this cough "at least for 2 years" and with further questioning there is been no change in her cough such as worsening, more purulence, increased chest congestion or shortness of breath.  No fever or chills.\par "I want to know why I have a cough" I reminded the patient that she has chronic lung disease and has a history of lung cancer with surgery and continues to smoke cigarettes 1 to 2 packs a day.  No other reason needs to be looked for for her cough.\par Again asked about any change and there has been none.\par \par Also wants to discuss her anemia of chronic disease for which she was in the hospital and now follows with hematology.  Recent hemoglobin continues to be decreased and most recent being 8.3 with hematology recommending transfusion with the patient does not want to do.  She questions about Procrit.  Has not discussed this yet with hematology.

## 2023-06-28 NOTE — ASSESSMENT
[FreeTextEntry1] : Patient with chronic lung disease with remote history of lung cancer with surgery continuing to smoke 1 to 2 packs a day with associated cough.  Nothing acute by history or exam.\par \par Anemia of chronic disease with hemoglobin now decreased to 8.3 therefore strongly recommended to the patient that she get transfused.  Recommended she discuss Procrit with hematology.

## 2023-06-28 NOTE — PHYSICAL EXAM
[No Acute Distress] : no acute distress [No Respiratory Distress] : no respiratory distress  [No Accessory Muscle Use] : no accessory muscle use [Clear to Auscultation] : lungs were clear to auscultation bilaterally [Normal Rate] : normal rate  [Regular Rhythm] : with a regular rhythm [No Edema] : there was no peripheral edema [No Focal Deficits] : no focal deficits [Normal Affect] : the affect was normal [de-identified] : Frail [de-identified] : Diffuse decreased breath sounds without rhonchi [de-identified] : Ambulates slowly with a walker

## 2023-07-25 PROBLEM — D63.8 ANEMIA OF CHRONIC DISEASE: Status: ACTIVE | Noted: 2022-09-02

## 2023-07-25 NOTE — PHYSICAL EXAM
[Restricted in physically strenuous activity but ambulatory and able to carry out work of a light or sedentary nature] : Status 1- Restricted in physically strenuous activity but ambulatory and able to carry out work of a light or sedentary nature, e.g., light house work, office work [Thin] : thin [Normal] : affect appropriate [de-identified] : frail

## 2023-07-25 NOTE — HISTORY OF PRESENT ILLNESS
[de-identified] : BRANNON GRANT is a 83 year old F followed for symptomatic anemia of chronic disease. \par  Has required transfusion. [de-identified] : Patient here for follow up accompanied with health aide\par Patient s/p 2 U PRBC in April 2023,  Last transfusion at time of hospitalization in early May\par \par Patient admits fatigue but stable\par Still smoking and no desire to stop.\par patient otheriwse feels well\par Denies BRBPR, melena, dizziness, SOB\par \par Today CBC WBC 4.5, Hg 8.5, HCT 27.9, Plt 282

## 2023-07-25 NOTE — ASSESSMENT
[FreeTextEntry1] : BRANNON GRANT is a 83 year old F followed for symptomatic anemia of chronic disease. \par  \par -Patient s/p 2 U PRBC in April 2023,  Last transfusion at time of hospitalization in early May\par -She was started on oral iron one tab daily in May. Continue oral iron \par - Monitor her CBC q 2 weeks given stability  \par - Discussed need for transfusion if Hg decreases, patient refusing blood transfusion. She wishes to continue to be observed \par - May consider Procrit in the future given decrease in GFR\par - F/u in 1 month with MD

## 2023-08-28 NOTE — ASSESSMENT
[FreeTextEntry1] : 83-year-old woman with symptomatic anemia of chronic disease (stage III renal insufficiency). We will initiate Retacrit 40,000 units monthly and monitor hemoglobin and hematocrit.

## 2023-08-28 NOTE — PHYSICAL EXAM
[Ambulatory and capable of all self care but unable to carry out any work activities] : Status 2- Ambulatory and capable of all self care but unable to carry out any work activities. Up and about more than 50% of waking hours [Thin] : thin [Normal] : normoactive bowel sounds, soft and nontender, no hepatosplenomegaly or masses appreciated [de-identified] : Pale

## 2023-08-28 NOTE — HISTORY OF PRESENT ILLNESS
[de-identified] : BRANNON GRANT is an 83 year old F followed for symptomatic anemia of chronic disease.   Has required transfusion.       Interval History: Patient here for follow up accompanied with health aide  Patient s/p 2 U PRBC in April 2023, Last transfusion at time of hospitalization in early May    Patient admits fatigue but stable  Still smoking and no desire to stop.  patient otheriwse feels well  Denies BRBPR, melena, dizziness, SOB    Today CBC WBC 3.8, Hg 8.3, HCT 26.4, Plt 289.    [de-identified] : Persistent fatigue

## 2023-10-05 PROBLEM — D64.9 SYMPTOMATIC ANEMIA: Status: ACTIVE | Noted: 2023-01-01

## 2023-10-05 PROBLEM — R53.83 FATIGUE, UNSPECIFIED TYPE: Status: ACTIVE | Noted: 2021-11-16

## 2023-10-05 PROBLEM — N18.31 CHRONIC KIDNEY DISEASE, STAGE 3A: Status: ACTIVE | Noted: 2021-08-31

## 2023-10-16 PROBLEM — R10.13 PAIN IN EPIGASTRIC REGION ON PALPATION: Status: ACTIVE | Noted: 2023-01-01

## 2023-10-16 PROBLEM — K92.0 COFFEE GROUND EMESIS: Status: ACTIVE | Noted: 2023-01-01

## 2023-10-16 PROBLEM — R53.1 WEAKNESS: Status: ACTIVE | Noted: 2023-01-01

## 2023-10-16 NOTE — ED ADULT NURSE NOTE - OBJECTIVE STATEMENT
Pt a&ox4 complains of nausea and vomiting intermittently for 3 days, pt denies diarrhea, chest pain, or shortness of breath. Pt's respirations even and unlabored on room air, hypertensive at triage but all other VSS, no distress noted.

## 2023-10-16 NOTE — ED ADULT TRIAGE NOTE - CHIEF COMPLAINT QUOTE
sent in from MD'S office for eval. c/o generalized abd pain, generalized weakness and 2 episodes of coffee ground emesis since last night. denies dizziness or sob.

## 2023-10-16 NOTE — ED PROVIDER NOTE - CLINICAL SUMMARY MEDICAL DECISION MAKING FREE TEXT BOX
84 y/o female with previously documented significant past medical history of breast CA s/p lumpectomy of right breast, lung CA s/p partial lobectomy of right lung (onc. Dr. Poe), GERD, HLD, COPD, Afib on Flecainide, anemia though on baby asa, presenting to emergency department with c/o x2 episodes of coffee ground emesis last night associated with mild weakness. Patient denies SOB, dizziness, abdominal pain, diarrhea, fevers, chest pain. Endorsing nausea. Patient is a poor historian in regard to providing medical history but in ED upon interview is alert and oriented x2-3. Denies requiring blood transfusion in the past.     Plan for EKG, blood work, antiemetic, UA, CXR

## 2023-10-16 NOTE — ED PROVIDER NOTE - ATTENDING CONTRIBUTION TO CARE
I, Nicole Fitch, have personally seen and examined this patient. I have fully participated in the care of this patient. I have reviewed all pertinent clinical information, including history, physical exam, plan and the Medical Student's note and agree except as noted below.     Patient with remote history of breast cancer with a lung resection COPD still smoking A-fib not on a blood thinner.  Per patient had 2 episodes of dark vomitus.  Per daughter patient is a poor historian has not been vomiting has had decreased p.o. intake for the last few days with generalized weakness also right-sided chest wall pain without any trauma.  Has a chronic cough today she had some brown spit up.  No respiratory distress on exam diminished breath sounds in the right side significant scoliosis and asymmetry of the chest wall without bony step-off or tenderness.  Abdomen soft and nontender no lower extremity edema.      Labs with baseline anemia.  Brown stool.  Normal renal function.  Chest x-ray with significant atelectasis/possible infiltrate right chest with shifting of the heart which is chronic CT with possible aspergilloma will consult ID pulm discussed with inpatient team will start voriconazole with the fungal culture

## 2023-10-16 NOTE — ED PROVIDER NOTE - CARDIAC, MLM
Regular rate, regular rhythm.  Heart sounds S1, S2.  No murmurs, rubs or gallops. + trace edema to RLE ankle, reports this has been present x1 year and she follows with cardiology who knows about it

## 2023-10-16 NOTE — ED PROVIDER NOTE - OBJECTIVE STATEMENT
84 y/o female with previously documented significant past medical history of breast CA s/p lumpectomy of right breast, lung CA s/p partial lobectomy of right lung (onc. Dr. Poe), GERD, HLD, COPD, Afib on Flecainide, anemia though on baby asa, presenting to emergency department with c/o x2 episodes of coffee ground emesis last night associated with mild weakness. Patient denies SOB, dizziness, abdominal pain, diarrhea, fevers, chest pain. Endorsing nausea. Patient is a poor historian in regard to providing medical history but in ED upon interview is alert and oriented x2-3. Denies requiring blood transfusion in the past. 82 y/o female with previously documented significant past medical history of breast CA s/p lumpectomy of right breast, lung CA s/p partial lobectomy of right lung (onc. Dr. Poe), GERD, HLD, COPD, Afib on Flecainide, anemia though on baby asa, presenting to emergency department with c/o x2 episodes of coffee ground emesis last night associated with mild weakness. Patient denies SOB, dizziness, abdominal pain, diarrhea, fevers, chest pain. Endorsing nausea. Patient is a poor historian in regard to providing medical history but in ED upon interview is alert and oriented x2-3. Denies requiring blood transfusion in the past.  Daughter came in to clarify- not vomiting spitting up brown liquid. and rt sided rib pain for 4 days. no known trauma. no fevers. patient is poor historian and poorly compliant with medications.

## 2023-10-16 NOTE — ED ADULT NURSE NOTE - NSFALLRISKINTERV_ED_ALL_ED

## 2023-10-16 NOTE — ED PROVIDER NOTE - CONSTITUTIONAL, MLM
normal... Well appearing, awake, alert, oriented to person, place, time though forgetful- in no apparent distress.

## 2023-10-16 NOTE — ED ADULT NURSE REASSESSMENT NOTE - NS ED NURSE REASSESS COMMENT FT1
Received report from FEDERICO English. patient resting on stretcher quietly at this time, no acute distress noted, family at bedside and call bell at reach. patient informed plan of care and states understanding.

## 2023-10-17 NOTE — CHART NOTE - NSCHARTNOTEFT_GEN_A_CORE
Patient is a 83 year old male with PMH of COPD, Lung CA, Current Smoker who was referred to the ED from her PCP for complaints of hemoptysis. CT reviewed; no indication for treatment per ID. Patient declined bronchoscopy therefore CTS consult cancelled. Daughter in agreement with patient's decision. No further episodes of hemoptysis while admitted. Daughter states patient is unclear if it was hemoptysis or hematemesis. Repeat CBC with Hb of 7.1. Start venofer and repeat labs this evening. Change Protonix 40 mg IV BID. Patient had been taking NSAIDS on an empty stomach. Patient/daughter also declined any endoscopic evaluations therefore GI consult was deferred. Patient reports chronic right sided rib pain. No fractures on CXR. Add lidocaine patch. ID and Pulm consult appreciated. F/u fungal studies and blood cultures. Observe off medications. Rest of the management as outlined in H&P earlier today.    Plan discussed with patient, Dr. Chen, Dr. Fontana, daughter, RN

## 2023-10-17 NOTE — CONSULT NOTE ADULT - ASSESSMENT
83y F active smoker with COPD, lung CA s/p lobectomy admitted for suspected hemoptysis. CT Chest with questionable aspergilloma.     ID input requested.     - Unclear if patient truly had hemoptysis. Patient was taking Advil ATC for 4 days prior to episode, thus increasing the concern for erosive gastritis from NSAIDs.   - Patient with extensive right lung abnormalities with questionable soft tissue attenuation that could be an aspergilloma.  - Would not recommend empiric treatment with antifungals which could be associated with DTD interactions and significant side effects.   - Diagnostic bronchoscopy is not desired by patient or daughter   - No signs of symptoms to suggest bronchitis or COPD exacerbation.   - AGM and Fungitell previously sent.   - Hgb stable. No further episodes of hemoptysis or coffee ground emesis   - Afebrile, hemodynamically stable.  - On RA    D/w Dr. Green and Dr. Chen   d/w at length with family     Please call with questions

## 2023-10-17 NOTE — CONSULT NOTE ADULT - SUBJECTIVE AND OBJECTIVE BOX
Patient is a 83y old  Female who presents with a chief complaint of R/O aspergillosis  Hemoptysis (17 Oct 2023 05:37)    HPI:  83F active smoker with hx of lung ca s/p partial lobectomy, breast ca s/p R lumpectomy, pAF not on AC, COPD not on home O2, HTN, HLD, hypothyroidism, chronic anemia presented to the ED 10/17 with hemoptysis per daughter with associated R sided chest discomfort. No associated fevers.   CT chest demonstrated RLL lobectomy w/ atelectasis with extensive bronchiectasis and possible RUL cavity with debris concerning for possible aspergilloma w/ L lung emphysema and YOSELIN subcm nodules. Pt started on empiric voriconazole per ID.      PAST MEDICAL & SURGICAL HISTORY:  Atrial fibrillation  PAF      Palpitations      Anxiety      COPD (chronic obstructive pulmonary disease)      Hyperlipidemia      Smoker      Hyperparathyroidism      Hypothyroid      Sinus problem      GERD (gastroesophageal reflux disease)      Lung cancer  right CT/RT      Breast cancer  right lumpectomy RT/CT      S/P partial lobectomy of lung  right      S/P lumpectomy, right breast  CT/RT    Medications:    atenolol  Tablet 25 milliGRAM(s) Oral daily  flecainide 50 milliGRAM(s) Oral two times a day      acetaminophen     Tablet .. 650 milliGRAM(s) Oral every 6 hours PRN  melatonin 3 milliGRAM(s) Oral at bedtime PRN  ondansetron Injectable 4 milliGRAM(s) IV Push every 8 hours PRN        aluminum hydroxide/magnesium hydroxide/simethicone Suspension 30 milliLiter(s) Oral every 4 hours PRN  pantoprazole    Tablet 40 milliGRAM(s) Oral before breakfast      atorvastatin 40 milliGRAM(s) Oral at bedtime    sodium chloride 0.9% lock flush 3 milliLiter(s) IV Push every 8 hours    Allergies:  Demerol HCl (Hives)  Sulfac 10% (Anaphylaxis; Short breath)  sulfa drugs (Hives)  shellfish (Hives)  adhesives (Rash)  Macrobid (Rash)  penicillin (Short breath)  cephalosporins (Anaphylaxis)  Ceclor (Rash)  contrast media (iodine-based) (Unknown)  codeine (Hives)    Social:      FH:     ICU Vital Signs Last 24 Hrs  T(C): 36.6 (17 Oct 2023 07:51), Max: 36.8 (17 Oct 2023 00:45)  T(F): 97.9 (17 Oct 2023 07:51), Max: 98.3 (17 Oct 2023 00:45)  HR: 67 (17 Oct 2023 07:51) (67 - 86)  BP: 167/67 (17 Oct 2023 07:51) (145/54 - 167/67)  BP(mean): 84 (17 Oct 2023 05:37) (84 - 84)  ABP: --  ABP(mean): --  RR: 19 (17 Oct 2023 07:51) (17 - 20)  SpO2: 95% (17 Oct 2023 07:51) (95% - 95%)    O2 Parameters below as of 17 Oct 2023 04:05  Patient On (Oxygen Delivery Method): room air      I&O's Detail        LABS:                        8.8    8.22  )-----------( 369      ( 16 Oct 2023 18:51 )             27.9     10-16    134<L>  |  96  |  25.5<H>  ----------------------------<  111<H>  4.6   |  27.0  |  0.90    Ca    10.8<H>      16 Oct 2023 18:51  Mg     2.0     10-16    TPro  7.7  /  Alb  3.3  /  TBili  0.4  /  DBili  x   /  AST  11  /  ALT  5   /  AlkPhos  101  10-16          CAPILLARY BLOOD GLUCOSE        PT/INR - ( 16 Oct 2023 18:51 )   PT: 11.9 sec;   INR: 1.07 ratio         PTT - ( 16 Oct 2023 18:51 )  PTT:26.9 sec  Urinalysis Basic - ( 16 Oct 2023 19:38 )    Color: Yellow / Appearance: Clear / S.015 / pH: x  Gluc: x / Ketone: Negative  / Bili: Negative / Urobili: Negative mg/dL   Blood: x / Protein: 30 mg/dL / Nitrite: Negative   Leuk Esterase: Trace / RBC: 0-2 /HPF / WBC 3-5 /HPF   Sq Epi: x / Non Sq Epi: x / Bacteria: Few    Physical Examination:    General: No acute distress.      HEENT: Pupils equal, reactive to light.  Symmetric.    PULM: Clear to auscultation bilaterally, no significant sputum production    NECK: Supple, no lymphadenopathy, trachea midline    CVS: Regular rate and rhythm, no murmurs, rubs, or gallops    ABD: Soft, nondistended, nontender, normoactive bowel sounds, no masses    EXT: No edema, nontender    SKIN: Warm and well perfused, no rashes noted.    NEURO: Alert, oriented, interactive, nonfocal    ROS: negative except per HPI    RADIOLOGY:   < from: CT Chest No Cont (10.16.23 @ 23:39) >    FINDINGS:  CHEST:  LUNGS AND LARGE AIRWAYS: Patent central airways. Right lower lobe   lobectomy. Complete atelectasis of the right lung with redemonstration of   marked bronchiectasis in the right upper and right middle lobes.   Questionable right upper lobe cavity with internal soft tissue   attenuation raising the possibility of an aspergilloma.Left lung   centrilobular emphysema. Left apical scarring. Left lower lobe   subsegmental atelectasis. Scattered left upper lobe pulmonary nodules   measuring up to 5 mm (series 5, image 126) and 4 mm (series 5, image 64).  PLEURA: Redemonstration ofa small loculated right pleural effusion.  VESSELS: Within normal limits.  HEART: Heart size is normal. No pericardial effusion.  MEDIASTINUM AND ORA: No lymphadenopathy. Coronary artery calcifications.   Redemonstrated marked rightward mediastinal shift.  CHEST WALL AND LOWER NECK: Redemonstrated 1.6 x 1.4 cm left thyroid lobe   nodule. 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 Subcutaneous   left chest wall loop recorder.    ABDOMEN AND PELVIS:  LIVER: Hepatic cysts.  BILE DUCTS: Normal caliber.  GALLBLADDER: Within normal limits.  SPLEEN: Within normal limits.  PANCREAS: Within normal limits.  ADRENALS: Within normal limits.  KIDNEYS/URETERS: Bilateral renal cysts. 5 mm right renal midpole   hemorrhagic cyst. No hydronephrosis.    BLADDER: Within normal limits.  REPRODUCTIVE ORGANS: Hysterectomy. No adnexal masses.    BOWEL: No bowel obstruction or inflammation. Sigmoid diverticulosis   without diverticulitis. Appendix is not visualized. No evidence of   inflammation in the pericecal region.  PERITONEUM: No ascites.  VESSELS: Within normal limits.  RETROPERITONEUM/LYMPH NODES: No lymphadenopathy.  ABDOMINAL WALL: Partially visualized large right adductor compartment   intramuscular lipoma.  BONES: Interval development of an age-indeterminate, but likely chronic,   T6 vertebral body compression fracture deformity resulting in greater   than 50% loss of vertebral body height. Mild degenerative changes of the   spine. Old bilateral rib fractures.    IMPRESSION:  1. Complete atelectasis of the right lung with redemonstration of marked   bronchiectasis in the right upper and right middle lobes. Questionable   right upper lobe cavity with internal soft tissue attenuation raising the   possibility of an aspergilloma.  2. No bowel obstruction or inflammation.    < end of copied text >   Patient is a 83y old  Female who presents with a chief complaint of R/O aspergillosis  Hemoptysis (17 Oct 2023 05:37)    HPI:  83F active smoker with hx of lung ca s/p partial lobectomy, breast ca s/p R lumpectomy, pAF not on AC, COPD not on home O2, HTN, HLD, hypothyroidism, chronic anemia presented to the ED 10/17 with hemoptysis with associated R sided chest discomfort. No associated fevers. Pt denies any shortness of breath. Denies any fevers/chills. She does note weight loss over the last year. Denies any night sweats. Denies any current chest pain. Denies any wheezing. There was some uncertainty regarding etiology of the blood as pt reports coffee ground emesis and has recently been using NSAIDs.   CT chest demonstrated RLL lobectomy w/ atelectasis with extensive bronchiectasis and possible RUL cavity with debris concerning for possible aspergilloma w/ L lung emphysema and YOSELIN subcm nodules. Pt started on empiric voriconazole per ID. No recurrent hemoptysis while in the ED.       PAST MEDICAL & SURGICAL HISTORY:  Atrial fibrillation  PAF      Palpitations      Anxiety      COPD (chronic obstructive pulmonary disease)      Hyperlipidemia      Smoker      Hyperparathyroidism      Hypothyroid      Sinus problem      GERD (gastroesophageal reflux disease)      Lung cancer  right CT/RT      Breast cancer  right lumpectomy RT/CT      S/P partial lobectomy of lung  right      S/P lumpectomy, right breast  CT/RT    Medications:    atenolol  Tablet 25 milliGRAM(s) Oral daily  flecainide 50 milliGRAM(s) Oral two times a day      acetaminophen     Tablet .. 650 milliGRAM(s) Oral every 6 hours PRN  melatonin 3 milliGRAM(s) Oral at bedtime PRN  ondansetron Injectable 4 milliGRAM(s) IV Push every 8 hours PRN        aluminum hydroxide/magnesium hydroxide/simethicone Suspension 30 milliLiter(s) Oral every 4 hours PRN  pantoprazole    Tablet 40 milliGRAM(s) Oral before breakfast      atorvastatin 40 milliGRAM(s) Oral at bedtime    sodium chloride 0.9% lock flush 3 milliLiter(s) IV Push every 8 hours    Allergies:  Demerol HCl (Hives)  Sulfac 10% (Anaphylaxis; Short breath)  sulfa drugs (Hives)  shellfish (Hives)  adhesives (Rash)  Macrobid (Rash)  penicillin (Short breath)  cephalosporins (Anaphylaxis)  Ceclor (Rash)  contrast media (iodine-based) (Unknown)  codeine (Hives)      Social: active smoker x >20 pack yrs, denies EtOH, denies illicit drug use, worked as a nurse     FH: denies any hx of pulmonary disease/malignancy    ICU Vital Signs Last 24 Hrs  T(C): 36.6 (17 Oct 2023 07:51), Max: 36.8 (17 Oct 2023 00:45)  T(F): 97.9 (17 Oct 2023 07:51), Max: 98.3 (17 Oct 2023 00:45)  HR: 67 (17 Oct 2023 07:51) (67 - 86)  BP: 167/67 (17 Oct 2023 07:51) (145/54 - 167/67)  BP(mean): 84 (17 Oct 2023 05:37) (84 - 84)  ABP: --  ABP(mean): --  RR: 19 (17 Oct 2023 07:51) (17 - 20)  SpO2: 95% (17 Oct 2023 07:51) (95% - 95%)    O2 Parameters below as of 17 Oct 2023 04:05  Patient On (Oxygen Delivery Method): room air    LABS:                        8.8    8.22  )-----------( 369      ( 16 Oct 2023 18:51 )             27.9     10-16    134<L>  |  96  |  25.5<H>  ----------------------------<  111<H>  4.6   |  27.0  |  0.90    Ca    10.8<H>      16 Oct 2023 18:51  Mg     2.0     10-16    TPro  7.7  /  Alb  3.3  /  TBili  0.4  /  DBili  x   /  AST  11  /  ALT  5   /  AlkPhos  101  10-16          CAPILLARY BLOOD GLUCOSE        PT/INR - ( 16 Oct 2023 18:51 )   PT: 11.9 sec;   INR: 1.07 ratio         PTT - ( 16 Oct 2023 18:51 )  PTT:26.9 sec  Urinalysis Basic - ( 16 Oct 2023 19:38 )    Color: Yellow / Appearance: Clear / S.015 / pH: x  Gluc: x / Ketone: Negative  / Bili: Negative / Urobili: Negative mg/dL   Blood: x / Protein: 30 mg/dL / Nitrite: Negative   Leuk Esterase: Trace / RBC: 0-2 /HPF / WBC 3-5 /HPF   Sq Epi: x / Non Sq Epi: x / Bacteria: Few    Physical Examination:    General: alert, in NAD     HEENT: NC/AT, anicteric, MMM    PULM: diminished throughout R, no accessory muscle use    NECK: Supple, trachea midline    CVS: S1S2, RRR    ABD: Soft, nondistended, nontender, normoactive bowel sounds    EXT: No edema, nontender    SKIN: Warm and well perfused, no rashes noted    NEURO: Alert, oriented, interactive, nonfocal    ROS: negative except per HPI    RADIOLOGY:   < from: CT Chest No Cont (10.16.23 @ 23:39) >    FINDINGS:  CHEST:  LUNGS AND LARGE AIRWAYS: Patent central airways. Right lower lobe   lobectomy. Complete atelectasis of the right lung with redemonstration of   marked bronchiectasis in the right upper and right middle lobes.   Questionable right upper lobe cavity with internal soft tissue   attenuation raising the possibility of an aspergilloma.Left lung   centrilobular emphysema. Left apical scarring. Left lower lobe   subsegmental atelectasis. Scattered left upper lobe pulmonary nodules   measuring up to 5 mm (series 5, image 126) and 4 mm (series 5, image 64).  PLEURA: Redemonstration ofa small loculated right pleural effusion.  VESSELS: Within normal limits.  HEART: Heart size is normal. No pericardial effusion.  MEDIASTINUM AND ORA: No lymphadenopathy. Coronary artery calcifications.   Redemonstrated marked rightward mediastinal shift.  CHEST WALL AND LOWER NECK: Redemonstrated 1.6 x 1.4 cm left thyroid lobe   nodule. 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 Subcutaneous   left chest wall loop recorder.    ABDOMEN AND PELVIS:  LIVER: Hepatic cysts.  BILE DUCTS: Normal caliber.  GALLBLADDER: Within normal limits.  SPLEEN: Within normal limits.  PANCREAS: Within normal limits.  ADRENALS: Within normal limits.  KIDNEYS/URETERS: Bilateral renal cysts. 5 mm right renal midpole   hemorrhagic cyst. No hydronephrosis.    BLADDER: Within normal limits.  REPRODUCTIVE ORGANS: Hysterectomy. No adnexal masses.    BOWEL: No bowel obstruction or inflammation. Sigmoid diverticulosis   without diverticulitis. Appendix is not visualized. No evidence of   inflammation in the pericecal region.  PERITONEUM: No ascites.  VESSELS: Within normal limits.  RETROPERITONEUM/LYMPH NODES: No lymphadenopathy.  ABDOMINAL WALL: Partially visualized large right adductor compartment   intramuscular lipoma.  BONES: Interval development of an age-indeterminate, but likely chronic,   T6 vertebral body compression fracture deformity resulting in greater   than 50% loss of vertebral body height. Mild degenerative changes of the   spine. Old bilateral rib fractures.    IMPRESSION:  1. Complete atelectasis of the right lung with redemonstration of marked   bronchiectasis in the right upper and right middle lobes. Questionable   right upper lobe cavity with internal soft tissue attenuation raising the   possibility of an aspergilloma.  2. No bowel obstruction or inflammation.    < end of copied text >

## 2023-10-17 NOTE — DISCHARGE NOTE NURSING/CASE MANAGEMENT/SOCIAL WORK - NSDCPEFALRISK_GEN_ALL_CORE
For information on Fall & Injury Prevention, visit: [email or web address] OR  [email or web address] OR  https://www.cdc.gov/steadi/patient.html

## 2023-10-17 NOTE — ED ADULT NURSE REASSESSMENT NOTE - NS ED NURSE REASSESS COMMENT FT1
patient was assisted to the restroom with no incidence, pt back in bed and safety precautions in place.

## 2023-10-17 NOTE — CONSULT NOTE ADULT - ASSESSMENT
83F active smoker with hx of lung ca s/p partial lobectomy, breast ca s/p R lumpectomy, pAF not on AC, COPD not on home O2, HTN, HLD, hypothyroidism, chronic anemia on procrit presented to the ED 10/17 with hemoptysis with R sided chest discomfort with CT chest demonstrating extensive bronchiectasis/atelectasis of R lung w/ concern for aspergilloma     Hemoptysis in the setting of possible aspergilloma   s/p voriconazole x 1; f/u ID recs for empiric tx   thoracic consult for bronchoscopy evaluation w/ sample sent for bacterial/AFB/fungal cx/cytopathology   provide sputum sample to allow quantification of hemoptysis   f/u fungitell/aspergillus antigen/aspergillus Ab  trend CBC; currently at baseline   no current evidence of copd exacerbation     83F active smoker with hx of lung ca s/p partial lobectomy, breast ca s/p R lumpectomy, pAF not on AC, COPD not on home O2, HTN, HLD, hypothyroidism, chronic anemia on procrit presented to the ED 10/17 with hemoptysis with R sided chest discomfort with CT chest demonstrating extensive bronchiectasis/atelectasis of R lung w/ concern for aspergilloma     Concern for hemoptysis in the setting of possible aspergilloma   unclear etiology of the bleeding as charting and family indicates possible coffee ground emesis  pt has had no recurrent hemoptysis in the ED   s/p voriconazole x 1; ID recs reviewed; no plan for ongoing treatment given questionable diagnosis/pt does not wish to pursue bronchoscopy and given significant drug toxicity     if recurrent hemoptysis and pt amenable can obtain thoracic consult for bronchoscopy evaluation w/ sample sent for bacterial/AFB/fungal cx/cytopathology   provide sputum sample to allow quantification of hemoptysis if recurs    f/u fungitell/aspergillus antigen/aspergillus Ab  trend CBC; currently at baseline   no current evidence of copd exacerbation    pt and daughter wishes to leave given no current plan for empiric treatment and given she does not wish to pursue bronchoscopic evaluation; have been advised to return for evaluation if recurrent hemoptysis, fevers or interval worsening   pt should follow-up closely with her pulmonologist and oncologist w/ repeat imaging outpatient

## 2023-10-17 NOTE — H&P ADULT - HISTORY OF PRESENT ILLNESS
84 y/o female with hx of Lung cancer s/p partial lobectomy, COPD, HTN, HLD, Afib not on AC, GERD, Hypothyroidism, chronic anemia, presents with 2 episodes of hemoptysis, right sided pleuritic CP x 4 days. Patient is a poor historian and initially c/o vomiting blood, but per Daughter patient coughed dipti brown sputum, no rosalind blood. No sick contacts, no travel, no fever/chills. Patient still smoking. Last saw her PMD on 10/5 for annual visit and has also been following with Heme for anemia and getting monthly epo injections. In the ED vitals stable, not hypoxic. Labs at baseline, Hbg at baseline CTA chest with right side scarring, bronchiectasis, and high suspicion of RUL aspergilloma. Blood cx done, 1 dose of Vfend given. CT chest not done with contrast due to contrast allergy.

## 2023-10-17 NOTE — H&P ADULT - ASSESSMENT
82 y/o female with possible aspergilloma, hx of Lung Cancer s/p partial lobectomy, COPD, Chronic anemia, Afib, HLD, HTN    Possible Aspergilloma:  -Invasive with hemoptysis ?  -Check IgE, galactomannan, Fungitell, Sputum/blood Cx  -s/p 1 dose of Vfend by ED, await ID eval  -Pulm Eval for possible bronch/BAL  -Not hypoxic, not septic  -OOB, ambulate w/ assistance, VC boots  -no chemical DVT-P with hemoptysis   -Fall risk    Lung Cancer:  -F/U with Oncology as o/p    COPD:  -No wheezing, not hypoxic  -prn nebs   -Patient declines desire to quit smoking     Afib:  -Not on AC  -Cont. BB/Tambocor     Chronic anemia:  -Hbg at baseline  -Follows with Dr. Loja for procrit injections     HLD:  -Statin    HTN:  -DASH diet  -Cont. Atenolol     Discussed with ED staff, Patient

## 2023-10-17 NOTE — DISCHARGE NOTE NURSING/CASE MANAGEMENT/SOCIAL WORK - PATIENT PORTAL LINK FT
You can access the FollowMyHealth Patient Portal offered by [facility] by registering at the following website: [email or web address]. By joining [facility]’s FollowMyHealth portal, you will also be able to view your health information using other applications (apps) compatible with our system.

## 2023-10-17 NOTE — DISCHARGE NOTE NURSING/CASE MANAGEMENT/SOCIAL WORK - NSDCFUADDAPPT_GEN_ALL_CORE_FT
Avinash – Appt w/ Dr. Soto on 10/31 at 10:15. [facility].   If you are unable to attend your pre-scheduled appointment, please contact the office directly at [phone] to reschedule.

## 2023-10-17 NOTE — CONSULT NOTE ADULT - SUBJECTIVE AND OBJECTIVE BOX
[facility]  INFECTIOUS DISEASES at [place] and [place]  =====================================================                               Mak Lowry MD*    Vesna Andre MD*                             Hetal Goss MD*     Le Vail MD*            Diplomates American Board of Internal Medicine & Infectious Diseases                * [place] Office - Appt - Tel  [phone] Fax [phone]                * [place] Office - Appt - Tel [phone] Fax [phone]                                  Hospital Consult line:  [phone]  =====================================================      [id number]  BRANNON GRANT        CC: Patient is a 83y old  Female who presents with a chief complaint of R/O aspergillosis. Hemoptysis (17 Oct 2023 08:53)      HPI: 84 y/o female with hx of Lung cancer s/p partial lobectomy, COPD, HTN, HLD, Afib not on AC, GERD, Hypothyroidism, chronic anemia, presents with 2 episodes of hemoptysis, right sided pleuritic CP x 4 days. Patient is a poor historian and initially c/o vomiting blood, but per Daughter patient coughed dipti brown sputum, no rosalind blood. No sick contacts, no travel, no fever/chills. Patient still smoking. Last saw her PMD on 10/5 for annual visit and has also been following with Heme for anemia and getting monthly epo injections. In the ED vitals stable, not hypoxic. Labs at baseline, Hbg at baseline CTA chest with right side scarring, bronchiectasis, and high suspicion of RUL aspergilloma. Blood cx done, 1 dose of Vfend given. CT chest not done with contrast due to contrast allergy.  (17 Oct 2023 05:37)    Spoke with patient and daughter who works as a PA for primary care.     Patient with right sided chest pain for 4 days after pulling a muscle for which she took multiple doses of Advil. She later had an episode of vomiting/spitting up brown material. Reportedly felt better after taking pantoprazole.  Denies increasing dyspnea, cough, worsening sputum production, fever or chills. No hemoptysis in the past.   [facility]  Active smoker. Does not uses any medications for COPD.     ______________________________________________________  PAST MEDICAL & SURGICAL HISTORY:  Atrial fibrillation    Anxiety    COPD (chronic obstructive pulmonary disease)    Hyperlipidemia    Hyperparathyroidism    Hypothyroid    GERD (gastroesophageal reflux disease)    S/P partial lobectomy of lung  right    S/P lumpectomy, right breast  CT/RT      Social History:  Active smoker of many decades  no alcohol or drug abuse     FAMILY HISTORY:  No pertinent family history in first degree relatives        ______________________________________________________  Allergies    Demerol HCl (Hives)  Sulfac 10% (Anaphylaxis; Short breath)  sulfa drugs (Hives)  shellfish (Hives)  adhesives (Rash)  Macrobid (Rash)  penicillin (Short breath)  cephalosporins (Anaphylaxis)  Ceclor (Rash)  contrast media (iodine-based) (Unknown)  codeine (Hives)    Intolerances        ______________________________________________________  MEDICATIONS:  Antibiotics:    Other medications:  atenolol  Tablet 25 milliGRAM(s) Oral daily  atorvastatin 40 milliGRAM(s) Oral at bedtime  flecainide 50 milliGRAM(s) Oral two times a day  pantoprazole    Tablet 40 milliGRAM(s) Oral before breakfast  sodium chloride 0.9% lock flush 3 milliLiter(s) IV Push every 8 hours    ______________________________________________________  REVIEW OF SYSTEMS:  CONSTITUTIONAL:  as per HPI   HEENT:  No diplopia or blurred vision.  No earache, sore throat or runny nose.  CARDIOVASCULAR:  No chest pain  RESPIRATORY:  as per HPI   GASTROINTESTINAL:  No nausea, vomiting, abdominal pain or diarrhea.  GENITOURINARY:  No dysuria, frequency or urgency. No blood in urine  MUSCULOSKELETAL:  as per HPI   SKIN:  No change in skin, hair or nails.  NEUROLOGIC:  No headaches, seizures  PSYCHIATRIC:  No disorder of thought or mood.  ENDOCRINE:  No heat or cold intolerance  HEMATOLOGICAL:  No easy bruising or bleeding.     _____________________________________________________  PHYSICAL EXAM:  GEN: AAOx4, elderly, frail cachectic in NAD   HEENT: normocephalic and atraumatic. .  Anicteric sclerae. Moist mucous membranes. No mucosal lesions. Dentures  NECK: Supple. No palpable neck masses or LN  LUNGS: eupneic, CTA B/L, no adventitious sounds  HEART: RRR, no m/r/g  ABDOMEN: Soft, NT, ND,  +BS.    :  no Stephenson catheter  EXTREMITIES: without  edema.  LYMPH: no palpable cervical, supraclavicularlymph nodes  NEUROLOGIC: Grossly no focal deficits   PSYCHIATRIC: Appropriate affect and mood.  SKIN: ecchymoses at venipuncture sites. Infiltrate left PIV site   LINES: PIV     ______________________________________________________  Height (cm): 129.5 (10-16 @ 16:45)  Weight (kg): 37.6 (10-17 @ 01:42)  BMI (kg/m2): 22.4 (10-17 @ 01:42)  BSA (m2): 1.14 (10-17 @ 01:42)    Vitals:  T(F): 97.9 (17 Oct 2023 07:51), Max: 98.3 (17 Oct 2023 00:45)  HR: 67 (17 Oct 2023 07:51)  BP: 167/67 (17 Oct 2023 07:51)  RR: 19 (17 Oct 2023 07:51)  SpO2: 95% (17 Oct 2023 07:51) (95% - 95%)  temp max in last 48H T(F): , Max: 98.3 (10-17-23 @ 00:45)    Current Antibiotics:    Other medications:  atenolol  Tablet 25 milliGRAM(s) Oral daily  atorvastatin 40 milliGRAM(s) Oral at bedtime  flecainide 50 milliGRAM(s) Oral two times a day  pantoprazole    Tablet 40 milliGRAM(s) Oral before breakfast  sodium chloride 0.9% lock flush 3 milliLiter(s) IV Push every 8 hours                            8.8    8.22  )-----------( 369      ( 16 Oct 2023 18:51 )             27.9     10-16    134<L>  |  96  |  25.5<H>  ----------------------------<  111<H>  4.6   |  27.0  |  0.90    Ca    10.8<H>      16 Oct 2023 18:51  Mg     2.0     10-16    TPro  7.7  /  Alb  3.3  /  TBili  0.4  /  DBili  x   /  AST  11  /  ALT  5   /  AlkPhos  101  10-16    RECENT CULTURES:      WBC Count: 8.22 K/uL (10-16-23 @ 18:51)    Creatinine: 0.90 mg/dL (10-16-23 @ 18:51)    ______________________________________________________  RADIOLOGY  < from: CT Abdomen and Pelvis No Cont (10.16.23 @ 23:39) >  FINDINGS:  CHEST:  LUNGS AND LARGE AIRWAYS: Patent central airways. Right lower lobe   lobectomy. Complete atelectasis of the right lung with redemonstration of   marked bronchiectasis in the right upper and right middle lobes.   Questionable right upper lobe cavity with internal soft tissue   attenuation raising the possibility of an aspergilloma.Left lung   centrilobular emphysema. Left apical scarring. Left lower lobe   subsegmental atelectasis. Scattered left upper lobe pulmonary nodules   measuring up to 5 mm (series 5, image 126) and 4 mm (series 5, image 64).  PLEURA: Redemonstration ofa small loculated right pleural effusion.  VESSELS: Within normal limits.  HEART: Heart size is normal. No pericardial effusion.  MEDIASTINUM AND ORA: No lymphadenopathy. Coronary artery calcifications.   Redemonstrated marked rightward mediastinal shift.  CHEST WALL AND LOWER NECK: Redemonstrated 1.6 x 1.4 cm left thyroid lobe   nodule. 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 Subcutaneous   left chest wall loop recorder.    ABDOMEN AND PELVIS:  LIVER: Hepatic cysts.  BILE DUCTS: Normal caliber.  GALLBLADDER: Within normal limits.  SPLEEN: Within normal limits.  PANCREAS: Within normal limits.  ADRENALS: Within normal limits.  KIDNEYS/URETERS: Bilateral renal cysts. 5 mm right renal midpole   hemorrhagic cyst. No hydronephrosis.    BLADDER: Within normal limits.  REPRODUCTIVE ORGANS: Hysterectomy. No adnexal masses.    BOWEL: No bowel obstruction or inflammation. Sigmoid diverticulosis   without diverticulitis. Appendix is not visualized. No evidence of   inflammation in the pericecal region.  PERITONEUM: No ascites.  VESSELS: Within normal limits.  RETROPERITONEUM/LYMPH NODES: No lymphadenopathy.  ABDOMINAL WALL: Partially visualized large right adductor compartment   intramuscular lipoma.  BONES: Interval development of an age-indeterminate, but likely chronic,   T6 vertebral body compression fracture deformity resulting in greater   than 50% loss of vertebral body height. Mild degenerative changes of the   spine. Old bilateral rib fractures.    IMPRESSION:  1. Complete atelectasis of the right lung with redemonstration of marked   bronchiectasis in the right upper and right middle lobes. Questionable   right upper lobe cavity with internal soft tissue attenuation raising the   possibility of an aspergilloma.  2. No bowel obstruction or inflammation.    < end of copied text >

## 2023-10-18 NOTE — DISCHARGE NOTE PROVIDER - CARE PROVIDER_API CALL
Chas Orozco.  Internal Medicine  [address] [place] [address]  Phone: [phone]  Fax: [phone]  Follow Up Time:     Charles Loja  Hematology  [address] [place] [address]  Phone: [phone]  Fax: [phone]  Follow Up Time:

## 2023-10-18 NOTE — DISCHARGE NOTE PROVIDER - NSDCFUSCHEDAPPT_GEN_ALL_CORE_FT
Traci Physician On license of [facility]  [facility]  Scheduled Appointment: 11/06/2023    Charles Loja [facility]  [facility]  Scheduled Appointment: 11/29/2023

## 2023-10-18 NOTE — DISCHARGE NOTE PROVIDER - ATTENDING DISCHARGE PHYSICAL EXAMINATION:
· Constitutional	no distress  · Constitutional Comments	elderly female sitting up in bed in NAD  · Eyes	conjunctiva clear  · ENMT	mouth  · Mouth	moist  · Respiratory	no rales; diminished breath sounds, R  · Cardiovascular	regular rate and rhythm; S1 S2 present  · Gastrointestinal	soft; nontender  · Mental Status	AAOX 2 person/place, not time  · Skin	warm and dry  · Musculoskeletal	no joint swelling  · Psychiatric	normal affect

## 2023-10-18 NOTE — DISCHARGE NOTE PROVIDER - NSDCCPCAREPLAN_GEN_ALL_CORE_FT
PRINCIPAL DISCHARGE DIAGNOSIS  Diagnosis: Aspergilloma  Assessment and Plan of Treatment:       SECONDARY DISCHARGE DIAGNOSES  Diagnosis: Chronic blood loss anemia  Assessment and Plan of Treatment:

## 2023-10-18 NOTE — DISCHARGE NOTE PROVIDER - HOSPITAL COURSE
Patient is a 83 year old male with PMH of COPD, Lung CA, Current Smoker who was referred to the ED from her PCP for complaints of hemoptysis. CT reviewed; no indication for treatment per ID. Patient declined bronchoscopy therefore CTS consult cancelled. Daughter in agreement with patient's decision. No further episodes of hemoptysis while admitted. Daughter states patient is unclear if it was hemoptysis or hematemesis. Repeat CBC with Hb of 7.1. Start venofer and repeat labs this evening. Change Protonix 40 mg IV BID. Patient had been taking NSAIDS on an empty stomach. Patient/daughter also declined any endoscopic evaluations therefore GI consult was deferred. Patient reports chronic right sided rib pain. No fractures on CXR. Added lidocaine patch. ID and Pulm consult appreciated. F/u fungal studies and blood cultures.     Possible Aspergilloma:  -Invasive with hemoptysis ?  -Pulm Eval for possible bronch/BAL, refused bronch  -Not hypoxic, not septic    Lung Cancer:  -F/U with Oncology as o/p    COPD:  -No wheezing, not hypoxic  -prn nebs   -Patient declines desire to quit smoking     Afib:  -Not on AC  -Cont. BB/Tambocor     Chronic anemia:  -Hbg at baseline  -Follows with Dr. Loja for procrit injections     HLD:  -Statin    dc

## 2023-10-18 NOTE — DISCHARGE NOTE PROVIDER - CARE PROVIDERS DIRECT ADDRESSES
,[email or web address]

## 2023-10-18 NOTE — DISCHARGE NOTE PROVIDER - NSDCMRMEDTOKEN_GEN_ALL_CORE_FT
acetaminophen 325 mg oral tablet: 2 tab(s) orally every 6 hours As needed Temp greater or equal to 38C (100.4F), Mild Pain (1 - 3)  aspirin 81 mg oral tablet: orally once a day  atenolol 25 mg oral tablet: 1 tab(s) orally once a day  atorvastatin 10 mg oral tablet: 1 orally once a day (at bedtime)  cholecalciferol oral tablet: 1000 unit(s) orally once a day  flecainide 50 mg oral tablet: 1 tab(s) orally every 12 hours

## 2023-10-19 PROBLEM — R07.82 INTERCOSTAL PAIN: Status: ACTIVE | Noted: 2023-01-01

## 2023-10-19 PROBLEM — R07.89 CHEST WALL PAIN: Status: ACTIVE | Noted: 2023-01-01

## 2023-10-28 PROBLEM — R26.81 GAIT INSTABILITY: Status: ACTIVE | Noted: 2023-01-01

## 2023-10-28 PROBLEM — R64 CACHEXIA: Status: ACTIVE | Noted: 2023-01-01

## 2023-10-28 PROBLEM — F17.210 TOBACCO SMOKER, 20 CIGARETTES OR FEWER PER DAY: Status: ACTIVE | Noted: 2023-01-01

## 2023-10-28 PROBLEM — J44.9 COPD (CHRONIC OBSTRUCTIVE PULMONARY DISEASE): Status: ACTIVE | Noted: 2021-02-10

## 2023-10-29 PROBLEM — D64.9 ANEMIA: Status: ACTIVE | Noted: 2021-12-15

## 2023-11-29 ENCOUNTER — APPOINTMENT (OUTPATIENT)
Dept: HEMATOLOGY ONCOLOGY | Facility: CLINIC | Age: 83
End: 2023-11-29

## 2023-12-04 ENCOUNTER — APPOINTMENT (OUTPATIENT)
Age: 83
End: 2023-12-04

## 2024-01-02 ENCOUNTER — APPOINTMENT (OUTPATIENT)
Age: 84
End: 2024-01-02

## 2024-04-30 NOTE — DISCHARGE NOTE PROVIDER - NSDCHHENCOUNTER_GEN_ALL_CORE
18-Oct-2023
Treatment Goal Explanation (Does Not Render In The Note): Stable for the purposes of categorizing medical decision making is defined by the specific treatment goals for an individual patient. A patient that is not at their treatment goal is not stable, even if the condition has not changed and there is no short- term threat to life or function.

## 2024-10-11 NOTE — ED ADULT TRIAGE NOTE - INTERNATIONAL TRAVEL
TRANSFER - OUT REPORT:    Verbal report given to Mireya on Behavior unit 1 on Garo Dawson  being transferred to room 112 for routine progression of patient care       Report consisted of patient's Situation, Background, Assessment and   Recommendations(SBAR).     Information from the following report(s) Nurse Handoff Report, ED Encounter Summary, ED SBAR, Adult Overview, Intake/Output, Recent Results, Med Rec Status, Neuro Assessment, and Event Log was reviewed with the receiving nurse.    Eugene Fall Assessment:    Presents to emergency department  because of falls (Syncope, seizure, or loss of consciousness): No  Age > 70: No  Altered Mental Status, Intoxication with alcohol or substance confusion (Disorientation, impaired judgment, poor safety awaremess, or inability to follow instructions): No  Impaired Mobility: Ambulates or transfers with assistive devices or assistance; Unable to ambulate or transer.: No             Lines:   Peripheral IV 10/10/24 Left Antecubital (Active)   Site Assessment Clean, dry & intact 10/10/24 1039   Line Status Blood return noted 10/10/24 1039   Phlebitis Assessment No symptoms 10/10/24 1039   Infiltration Assessment 0 10/10/24 1039        Opportunity for questions and clarification was provided.      Patient transported with:  Tech and security        No

## 2024-10-28 ENCOUNTER — APPOINTMENT (OUTPATIENT)
Dept: INTERNAL MEDICINE | Facility: CLINIC | Age: 84
End: 2024-10-28

## 2025-06-20 NOTE — PATIENT PROFILE ADULT. - ALCOHOL USE HISTORY SINGLE SELECT
No care due was identified.  [facility] Embedded Care Due Messages. Reference number: [id number].   6/20/2025 2:28:56 PM CDT   yes...